# Patient Record
Sex: MALE | Race: WHITE | NOT HISPANIC OR LATINO | Employment: OTHER | URBAN - METROPOLITAN AREA
[De-identification: names, ages, dates, MRNs, and addresses within clinical notes are randomized per-mention and may not be internally consistent; named-entity substitution may affect disease eponyms.]

---

## 2017-01-13 ENCOUNTER — OFFICE VISIT (OUTPATIENT)
Dept: INTERNAL MEDICINE | Facility: CLINIC | Age: 82
End: 2017-01-13

## 2017-01-13 VITALS
TEMPERATURE: 97.4 F | SYSTOLIC BLOOD PRESSURE: 118 MMHG | BODY MASS INDEX: 26.61 KG/M2 | DIASTOLIC BLOOD PRESSURE: 68 MMHG | HEART RATE: 76 BPM | RESPIRATION RATE: 16 BRPM | WEIGHT: 155 LBS | OXYGEN SATURATION: 98 %

## 2017-01-13 DIAGNOSIS — J30.2 SEASONAL ALLERGIC RHINITIS, UNSPECIFIED ALLERGIC RHINITIS TRIGGER: ICD-10-CM

## 2017-01-13 DIAGNOSIS — J06.9 ACUTE URI: Primary | ICD-10-CM

## 2017-01-13 PROCEDURE — 99213 OFFICE O/P EST LOW 20 MIN: CPT | Performed by: NURSE PRACTITIONER

## 2017-01-13 RX ORDER — CETIRIZINE HYDROCHLORIDE 5 MG/1
5 TABLET ORAL DAILY
Qty: 90 TABLET | Refills: 3 | Status: SHIPPED | OUTPATIENT
Start: 2017-01-13 | End: 2019-03-18

## 2017-01-13 RX ORDER — FLUTICASONE PROPIONATE 50 MCG
2 SPRAY, SUSPENSION (ML) NASAL DAILY
Qty: 1 EACH | Refills: 0 | Status: SHIPPED | OUTPATIENT
Start: 2017-01-13 | End: 2017-02-10 | Stop reason: SDUPTHER

## 2017-01-13 NOTE — PROGRESS NOTES
Vitals:    01/13/17 1043   BP: 118/68   Pulse: 76   Resp: 16   Temp: 97.4 °F (36.3 °C)   SpO2: 98%     Last 2 weights    01/13/17  1043   Weight: 155 lb (70.3 kg)     Social History   Substance Use Topics   • Smoking status: Former Smoker   • Smokeless tobacco: Not on file   • Alcohol use No       Subjective     HPI  Upper Respiratory Infection: Patient complains of symptoms of a URI. Symptoms include congestion and cough, scratchy throat, and slight sore throat. Onset of symptoms was 3 weeks ago, unchanged since that time..  He is drinking moderate amounts of fluids. Evaluation to date: none. Treatment to date: ken vapor rub, salt water gargles.    The following portions of the patient's history were reviewed and updated as appropriate: allergies, current medications, past medical history, past social history and problem list.    Review of Systems   Constitutional: Negative.    HENT: Positive for congestion and sore throat.         Scratchy throat     Respiratory: Positive for cough.    Cardiovascular: Negative.        Objective     Physical Exam   Constitutional: He is oriented to person, place, and time. He appears well-developed and well-nourished.   HENT:   Head: Normocephalic and atraumatic.   Mouth/Throat: Posterior oropharyngeal erythema present.   Neck: Normal range of motion.   Cardiovascular: Normal rate, regular rhythm and normal heart sounds.    Pulmonary/Chest: Effort normal and breath sounds normal.   Musculoskeletal: Normal range of motion.   Neurological: He is alert and oriented to person, place, and time.   Nursing note and vitals reviewed.      Assessment/Plan   Haris was seen today for uri, cough and fatigue.    Diagnoses and all orders for this visit:    Acute URI    Seasonal allergic rhinitis, unspecified allergic rhinitis trigger    Other orders  -     Dextromethorphan-Guaifenesin  MG capsule; Take  mg by mouth Every 4 (Four) Hours As Needed (congestion and cough).  -      cetirizine (zyrTEC) 5 MG tablet; Take 1 tablet by mouth Daily.  -     fluticasone (FLONASE) 50 MCG/ACT nasal spray; 2 sprays into each nostril Daily for 30 days.         -advised pt to check prices for OTC medications prior to picking up scripts I sent in electronically to compare prices  -cont home meds  -increase fluid intake  -FU PRN or if symptoms persist/worsen

## 2017-01-26 ENCOUNTER — OFFICE VISIT (OUTPATIENT)
Dept: ORTHOPEDIC SURGERY | Facility: CLINIC | Age: 82
End: 2017-01-26

## 2017-01-26 VITALS — BODY MASS INDEX: 26.12 KG/M2 | WEIGHT: 153 LBS | TEMPERATURE: 97.5 F | HEIGHT: 64 IN

## 2017-01-26 DIAGNOSIS — M79.641 BILATERAL HAND PAIN: Primary | ICD-10-CM

## 2017-01-26 DIAGNOSIS — M79.642 BILATERAL HAND PAIN: Primary | ICD-10-CM

## 2017-01-26 DIAGNOSIS — G56.03 BILATERAL CARPAL TUNNEL SYNDROME: ICD-10-CM

## 2017-01-26 PROCEDURE — 99203 OFFICE O/P NEW LOW 30 MIN: CPT | Performed by: ORTHOPAEDIC SURGERY

## 2017-01-26 PROCEDURE — 20526 THER INJECTION CARP TUNNEL: CPT | Performed by: ORTHOPAEDIC SURGERY

## 2017-01-26 PROCEDURE — 73130 X-RAY EXAM OF HAND: CPT | Performed by: ORTHOPAEDIC SURGERY

## 2017-01-26 RX ADMIN — BUPIVACAINE HYDROCHLORIDE 1 ML: 5 INJECTION, SOLUTION PERINEURAL at 14:35

## 2017-01-26 RX ADMIN — METHYLPREDNISOLONE ACETATE 80 MG: 80 INJECTION, SUSPENSION INTRA-ARTICULAR; INTRALESIONAL; INTRAMUSCULAR; SOFT TISSUE at 14:35

## 2017-01-26 NOTE — PROGRESS NOTES
New Bilateral Hand      Patient: Haris Sweeney        YOB: 1929        Chief Complaints: Bilateral hand pain right is worse and left  Chief Complaint   Patient presents with   • Left Hand - Establish Care   • Right Hand - Establish Care         History of Present Illness: This is a 87-year-old gentleman is right-hand-dominant presents complaining of bilateral hand pain and numbness in complaints of dropping things right is worse than left.  He gets significant numbness at night that wakes him up.  He's tried some braces his symptoms are moderate intermittent grinding numbness swelling he's tried ice rest and some braces.  His past medical history marked for diabetes anemia thyroid rheumatoid cardiac disease osteoporosis bladder cancer depression anxiety all of which are stable at present per the patient    HPI      Allergies:   Allergies   Allergen Reactions   • Digoxin And Related    • Keflex [Cephalexin]        Medications:   Home Medications:  Current Outpatient Prescriptions on File Prior to Visit   Medication Sig   • allopurinol (ZYLOPRIM) 100 MG tablet Take 1 tablet by mouth 2 (two) times a day.   • B Complex Vitamins (VITAMIN B COMPLEX PO) Take  by mouth.   • Blood Glucose Monitoring Suppl (ONE TOUCH ULTRA 2) W/DEVICE kit Test glucose daily   • carvedilol (COREG) 6.25 MG tablet TAKE 1 TABLET BY MOUTH TWICE A DAY   • cetirizine (zyrTEC) 5 MG tablet Take 1 tablet by mouth Daily.   • citalopram (CeleXA) 10 MG tablet Take 1 tablet by mouth daily. TAKE 1 TABLET BY MOUTH DAILY   • Dextromethorphan-Guaifenesin  MG capsule Take  mg by mouth Every 4 (Four) Hours As Needed (congestion and cough).   • docusate sodium (COLACE) 100 MG capsule Take 100 mg by mouth 2 (two) times a day.   • finasteride (PROSCAR) 5 MG tablet Take 5 mg by mouth Daily.   • fluticasone (FLONASE) 50 MCG/ACT nasal spray 2 sprays into each nostril Daily for 30 days.   • furosemide (LASIX) 40 MG tablet TAKE 1 TABLET BY  MOUTH 2 (TWO) TIMES A DAY.   • glimepiride (AMARYL) 2 MG tablet Take 2 mg by mouth every morning before breakfast.   • glucose blood test strip Test glucose daily DX E11.9   • levothyroxine (SYNTHROID, LEVOTHROID) 100 MCG tablet Take 1 tablet by mouth daily.   • metFORMIN (GLUCOPHAGE) 500 MG tablet TAKE 1 TABLET BY MOUTH TWICE A DAY   • ONE TOUCH LANCETS misc Test glucose daily dx e11.9   • pantoprazole (PROTONIX) 40 MG EC tablet Take 40 mg by mouth daily.   • potassium chloride (K-DUR) 10 MEQ CR tablet Take 10 mEq by mouth daily.   • Saw Palmetto 450 MG capsule Take  by mouth.   • valsartan (DIOVAN) 40 MG tablet Take 1 tablet by mouth Daily.   • warfarin (COUMADIN) 2.5 MG tablet TAKE 5MG ON MONDAY AND FRIDAY TAKE 2.5MG ALL OTHER DAYS.     No current facility-administered medications on file prior to visit.      Current Medications:  Scheduled Meds:  Continuous Infusions:  No current facility-administered medications for this visit.   PRN Meds:.    Past Medical History   Diagnosis Date   • Bladder cancer    • Kidney stones    • Thyroid goiter         Past Surgical History   Procedure Laterality Date   • Total thyroidectomy     • Cataract extraction          Social History     Occupational History   • Not on file.     Social History Main Topics   • Smoking status: Former Smoker     Types: Cigarettes   • Smokeless tobacco: Never Used   • Alcohol use No   • Drug use: No   • Sexual activity: Not on file    Social History     Social History Narrative        Family History   Problem Relation Age of Onset   • Heart disease Mother    • No Known Problems Father    • Prostate cancer Brother              Review of Systems: 14 point review of systems are remarkable for pertinent positives listed in the chart by the patient the remainder are negative    Review of Systems      Physical Exam: 87 y.o. male  General Appearance:    Alert, cooperative, in no acute distress                 Vitals:    01/26/17 1406   Temp: 97.5 °F (36.4  "°C)   TempSrc: Temporal Artery    Weight: 153 lb (69.4 kg)   Height: 64\" (162.6 cm)      Patient is alert and read ×3 no acute distress appears her above-listed at height weight and age.  Affect is normal respiratory rate is normal unlabored. Heart rate regular rate rhythm, sclera, dentition and hearing are normal for the purpose of this exam.        Ortho Exam physical exam of both hands is no overlying skin changes lipedema lymphadenopathy his exam is symmetric side to side he has marked thenar atrophy bilaterally he has a positive Tinel's positive Phalen's test.  He has good wrist range of motion normal form exam normal elbow exam he has 1+ to 2+ distal pulses and good capillary refill           Radiology:   AP, Lateral of the Hand were ordered/reviewed to evaluate pain.  These were done bilaterally to evaluate his complaints have no comparative films.  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  His right is worse and left talked about options he has such thenar atrophy that I'm not sure release would help I like him to see one of the hand goes.  His EMG does show that he has moderate to severe carpal tunnel syndrome.  We'll inject his more symptomatic or I will get him over to Dr. Blanco                Small Joint Arthrocentesis  Consent given by: patient  Site marked: site marked  Timeout: Immediately prior to procedure a time out was called to verify the correct patient, procedure, equipment, support staff and site/side marked as required   Supporting Documentation  Indications: pain   Procedure Details  Location: thumb - Thumb joint: carpa; tunnel right.  Preparation: Patient was prepped and draped in the usual sterile fashion  Needle size: 25 G  Approach: volar  Medications " administered: 80 mg methylPREDNISolone acetate 80 MG/ML; 1 mL bupivacaine

## 2017-01-26 NOTE — MR AVS SNAPSHOT
[facility]  [phone]                    Haris Sweeney   1/26/2017 1:45 PM   Office Visit    Dept Phone:  [phone]   Encounter #:  [id number]    Provider:  Ashley Mcclellan MD   Department:  [facility]                Your Full Care Plan              Your Updated Medication List          This list is accurate as of: 1/26/17  2:53 PM.  Always use your most recent med list.                allopurinol 100 MG tablet   Commonly known as:  ZYLOPRIM   Take 1 tablet by mouth 2 (two) times a day.       carvedilol 6.25 MG tablet   Commonly known as:  COREG       cetirizine 5 MG tablet   Commonly known as:  zyrTEC   Take 1 tablet by mouth Daily.       citalopram 10 MG tablet   Commonly known as:  CeleXA   Take 1 tablet by mouth daily. TAKE 1 TABLET BY MOUTH DAILY       Dextromethorphan-Guaifenesin  MG capsule   Take  mg by mouth Every 4 (Four) Hours As Needed (congestion and cough).       docusate sodium 100 MG capsule   Commonly known as:  COLACE       finasteride 5 MG tablet   Commonly known as:  PROSCAR       fluticasone 50 MCG/ACT nasal spray   Commonly known as:  FLONASE   2 sprays into each nostril Daily for 30 days.       furosemide 40 MG tablet   Commonly known as:  LASIX   TAKE 1 TABLET BY MOUTH 2 (TWO) TIMES A DAY.       glimepiride 2 MG tablet   Commonly known as:  AMARYL       glucose blood test strip   Test glucose daily DX E11.9       levothyroxine 100 MCG tablet   Commonly known as:  SYNTHROID, LEVOTHROID   Take 1 tablet by mouth daily.       metFORMIN 500 MG tablet   Commonly known as:  GLUCOPHAGE   TAKE 1 TABLET BY MOUTH TWICE A DAY       ONE TOUCH LANCETS misc   Test glucose daily dx e11.9       ONE TOUCH ULTRA 2 W/DEVICE kit   Test glucose daily       pantoprazole 40 MG EC tablet   Commonly known as:  PROTONIX       potassium chloride 10 MEQ CR tablet   Commonly known as:   "K-DUR       Saw Palmetto 450 MG capsule       valsartan 40 MG tablet   Commonly known as:  DIOVAN   Take 1 tablet by mouth Daily.       VITAMIN B COMPLEX PO       warfarin 2.5 MG tablet   Commonly known as:  COUMADIN               We Performed the Following     Ambulatory Referral to Hand Surgery     Small Joint Arthrocentesis     XR Hand 3+ View Bilateral       You Were Diagnosed With        Codes Comments    Bilateral hand pain    -  Primary ICD-10-CM: M79.641, M79.642  ICD-9-CM: 729.5     Bilateral carpal tunnel syndrome     ICD-10-CM: G56.03  ICD-9-CM: 354.0       Instructions     None    Patient Instructions History      Goals     Eat more fruits and vegetables     I have stressed to him the need for proper healthy diet low carbohydrate with more vegetables and high-fiber        Upcoming Appointments     Visit Type Date Time Department    OFFICE VISIT 1/26/2017  1:45 PM [facility]    OFFICE VISIT 4/5/2017  1:30 PM [facility] 1 [id number]      [employer] Signup     Our records indicate that you have declined [facility] [employer] signup. If you would like to sign up for [employer], please email [email or web address] or call [phone] to obtain an activation code.             Other Info from Your Visit           Your Appointments     Apr 05, 2017  1:30 PM EDT   Office Visit with Srikanth Madsen MD   [facility] (--)    [address]  [facility] [address] [phone]           Arrive 15 minutes prior to appointment.              Allergies     Digoxin And Related      Keflex [Cephalexin]        Reason for Visit     Left Hand - Establish Care     Right Hand - Establish Care           Vital Signs     Temperature Height Weight Body Mass Index Smoking Status       97.5 °F (36.4 °C) (Temporal Artery ) 64\" (162.6 cm) 153 lb (69.4 kg) 26.26 kg/m2 Former Smoker       Problems and Diagnoses Noted     Bilateral carpal tunnel syndrome    Bilateral hand pain        "

## 2017-01-31 RX ORDER — BUPIVACAINE HYDROCHLORIDE 5 MG/ML
1 INJECTION, SOLUTION PERINEURAL
Status: COMPLETED | OUTPATIENT
Start: 2017-01-26 | End: 2017-01-26

## 2017-01-31 RX ORDER — METHYLPREDNISOLONE ACETATE 80 MG/ML
80 INJECTION, SUSPENSION INTRA-ARTICULAR; INTRALESIONAL; INTRAMUSCULAR; SOFT TISSUE
Status: COMPLETED | OUTPATIENT
Start: 2017-01-26 | End: 2017-01-26

## 2017-02-10 RX ORDER — FLUTICASONE PROPIONATE 50 MCG
SPRAY, SUSPENSION (ML) NASAL
Qty: 16 ML | Refills: 0 | Status: SHIPPED | OUTPATIENT
Start: 2017-02-10 | End: 2017-03-20 | Stop reason: SDUPTHER

## 2017-03-20 RX ORDER — FLUTICASONE PROPIONATE 50 MCG
SPRAY, SUSPENSION (ML) NASAL
Qty: 16 ML | Refills: 5 | Status: SHIPPED | OUTPATIENT
Start: 2017-03-20 | End: 2017-04-04 | Stop reason: SDUPTHER

## 2017-03-22 RX ORDER — LEVOTHYROXINE SODIUM 0.1 MG/1
TABLET ORAL
Qty: 90 TABLET | Refills: 3 | Status: SHIPPED | OUTPATIENT
Start: 2017-03-22 | End: 2018-01-08 | Stop reason: SDUPTHER

## 2017-03-23 RX ORDER — ALLOPURINOL 100 MG/1
TABLET ORAL
Qty: 180 TABLET | Refills: 3 | Status: SHIPPED | OUTPATIENT
Start: 2017-03-23 | End: 2018-01-08 | Stop reason: SDUPTHER

## 2017-04-04 RX ORDER — FLUTICASONE PROPIONATE 50 MCG
SPRAY, SUSPENSION (ML) NASAL
Qty: 16 ML | Refills: 0 | Status: SHIPPED | OUTPATIENT
Start: 2017-04-04 | End: 2017-05-24 | Stop reason: SDUPTHER

## 2017-04-05 ENCOUNTER — OFFICE VISIT (OUTPATIENT)
Dept: INTERNAL MEDICINE | Facility: CLINIC | Age: 82
End: 2017-04-05

## 2017-04-05 VITALS
DIASTOLIC BLOOD PRESSURE: 70 MMHG | HEART RATE: 70 BPM | TEMPERATURE: 98.7 F | WEIGHT: 156 LBS | BODY MASS INDEX: 26.78 KG/M2 | SYSTOLIC BLOOD PRESSURE: 128 MMHG | OXYGEN SATURATION: 98 %

## 2017-04-05 DIAGNOSIS — K21.9 GASTROESOPHAGEAL REFLUX DISEASE WITHOUT ESOPHAGITIS: ICD-10-CM

## 2017-04-05 DIAGNOSIS — E03.8 OTHER SPECIFIED HYPOTHYROIDISM: ICD-10-CM

## 2017-04-05 DIAGNOSIS — M15.9 GENERALIZED OSTEOARTHRITIS OF MULTIPLE SITES: ICD-10-CM

## 2017-04-05 DIAGNOSIS — G56.03 BILATERAL CARPAL TUNNEL SYNDROME: ICD-10-CM

## 2017-04-05 DIAGNOSIS — I10 ESSENTIAL HYPERTENSION: Primary | ICD-10-CM

## 2017-04-05 DIAGNOSIS — E11.9 NON-INSULIN DEPENDENT TYPE 2 DIABETES MELLITUS (HCC): ICD-10-CM

## 2017-04-05 PROCEDURE — 99214 OFFICE O/P EST MOD 30 MIN: CPT | Performed by: INTERNAL MEDICINE

## 2017-04-06 LAB
HBA1C MFR BLD: 5.1 % (ref 4.8–5.6)
MICROALBUMIN UR-MCNC: 5.3 UG/ML

## 2017-04-15 NOTE — PROGRESS NOTES
Subjective   Haris Sweeney is a 87 y.o. male.   He is here today for hypertension GERD non-insulin-dependent type 2 diabetes hypothyroidism bilateral carpal tunnel syndrome osteoarthritis of multiple sites  History of Present Illness   He is here today for hypertension GERD non-insulin-dependent type 2 diabetes hypothyroidism bilateral carpal tunnel syndrome osteoarthritis multiple sites  The following portions of the patient's history were reviewed and updated as appropriate: allergies, current medications, past family history, past medical history, past social history, past surgical history and problem list.    Review of Systems   Musculoskeletal: Positive for arthralgias.   All other systems reviewed and are negative.      Objective   Physical Exam   Constitutional: He is oriented to person, place, and time. He appears well-developed and well-nourished. He is cooperative.   HENT:   Head: Normocephalic and atraumatic.   Right Ear: Hearing, tympanic membrane, external ear and ear canal normal.   Left Ear: Hearing, tympanic membrane, external ear and ear canal normal.   Nose: Nose normal.   Mouth/Throat: Uvula is midline, oropharynx is clear and moist and mucous membranes are normal.   Eyes: Conjunctivae, EOM and lids are normal. Pupils are equal, round, and reactive to light.   Neck: Phonation normal. Neck supple. Carotid bruit is not present.   Cardiovascular: Normal rate, regular rhythm and normal heart sounds.  Exam reveals no gallop and no friction rub.    No murmur heard.  Pulmonary/Chest: Effort normal and breath sounds normal. No respiratory distress.   Abdominal: Soft. Bowel sounds are normal. He exhibits no distension and no mass. There is no hepatosplenomegaly. There is no tenderness. There is no rebound and no guarding. No hernia.   Musculoskeletal: He exhibits no edema.        Right wrist: He exhibits decreased range of motion and tenderness.        Left wrist: He exhibits decreased range of motion and  tenderness.   Neurological: He is alert and oriented to person, place, and time. Coordination and gait normal.   Skin: Skin is warm and dry.   Psychiatric: He has a normal mood and affect. His speech is normal and behavior is normal. Judgment and thought content normal.   Nursing note and vitals reviewed.      Assessment/Plan   Diagnoses and all orders for this visit:    Essential hypertension    Gastroesophageal reflux disease without esophagitis    Non-insulin dependent type 2 diabetes mellitus  -     Hemoglobin A1c  -     MicroAlbumin, Urine, Random    Other specified hypothyroidism    Bilateral carpal tunnel syndrome    Generalized osteoarthritis of multiple sites    Other orders  -     Pneumococcal Conjugate Vaccine 13-Valent All      Hypertension well-controlled on current medication  GERD stable on current medication  Non-insulin-dependent type 2 diabetes follow hemoglobin A1c  Hypothyroidism follow TSH free T4  Bilateral carpal tunnel syndrome supportive meds and hand surgery consult when he is ready  Osteoporosis multiple sites supportive meds physical therapy

## 2017-04-17 ENCOUNTER — APPOINTMENT (OUTPATIENT)
Dept: GENERAL RADIOLOGY | Facility: HOSPITAL | Age: 82
End: 2017-04-17

## 2017-04-17 ENCOUNTER — APPOINTMENT (OUTPATIENT)
Dept: CT IMAGING | Facility: HOSPITAL | Age: 82
End: 2017-04-17

## 2017-04-17 ENCOUNTER — HOSPITAL ENCOUNTER (INPATIENT)
Facility: HOSPITAL | Age: 82
LOS: 3 days | Discharge: HOME | End: 2017-04-20
Attending: EMERGENCY MEDICINE | Admitting: INTERNAL MEDICINE

## 2017-04-17 DIAGNOSIS — N28.9 RENAL INSUFFICIENCY: ICD-10-CM

## 2017-04-17 DIAGNOSIS — S06.6X1A: ICD-10-CM

## 2017-04-17 DIAGNOSIS — R55 SYNCOPE AND COLLAPSE: Primary | ICD-10-CM

## 2017-04-17 DIAGNOSIS — R53.1 GENERALIZED WEAKNESS: ICD-10-CM

## 2017-04-17 PROBLEM — I61.9 ICH (INTRACEREBRAL HEMORRHAGE) (HCC): Status: ACTIVE | Noted: 2017-04-17

## 2017-04-17 LAB
ALBUMIN SERPL-MCNC: 3 G/DL (ref 3.5–5.2)
ALBUMIN/GLOB SERPL: 0.9 G/DL
ALP SERPL-CCNC: 65 U/L (ref 39–117)
ALT SERPL W P-5'-P-CCNC: 21 U/L (ref 1–41)
ANION GAP SERPL CALCULATED.3IONS-SCNC: 12.2 MMOL/L
AST SERPL-CCNC: 30 U/L (ref 1–40)
BASOPHILS # BLD AUTO: 0.01 10*3/MM3 (ref 0–0.2)
BASOPHILS NFR BLD AUTO: 0.1 % (ref 0–1.5)
BILIRUB SERPL-MCNC: 0.4 MG/DL (ref 0.1–1.2)
BUN BLD-MCNC: 49 MG/DL (ref 8–23)
BUN/CREAT SERPL: 32.5 (ref 7–25)
CALCIUM SPEC-SCNC: 8.9 MG/DL (ref 8.6–10.5)
CHLORIDE SERPL-SCNC: 94 MMOL/L (ref 98–107)
CO2 SERPL-SCNC: 26.8 MMOL/L (ref 22–29)
CREAT BLD-MCNC: 1.51 MG/DL (ref 0.76–1.27)
DEPRECATED RDW RBC AUTO: 54.4 FL (ref 37–54)
EOSINOPHIL # BLD AUTO: 0.05 10*3/MM3 (ref 0–0.7)
EOSINOPHIL NFR BLD AUTO: 0.6 % (ref 0.3–6.2)
ERYTHROCYTE [DISTWIDTH] IN BLOOD BY AUTOMATED COUNT: 14.8 % (ref 11.5–14.5)
GFR SERPL CREATININE-BSD FRML MDRD: 44 ML/MIN/1.73
GLOBULIN UR ELPH-MCNC: 3.2 GM/DL
GLUCOSE BLD-MCNC: 117 MG/DL (ref 65–99)
GLUCOSE BLDC GLUCOMTR-MCNC: 116 MG/DL (ref 70–130)
HCT VFR BLD AUTO: 44.1 % (ref 40.4–52.2)
HGB BLD-MCNC: 14.7 G/DL (ref 13.7–17.6)
IMM GRANULOCYTES # BLD: 0.04 10*3/MM3 (ref 0–0.03)
IMM GRANULOCYTES NFR BLD: 0.4 % (ref 0–0.5)
INR PPP: 1.65 (ref 0.9–1.1)
LYMPHOCYTES # BLD AUTO: 1.39 10*3/MM3 (ref 0.9–4.8)
LYMPHOCYTES NFR BLD AUTO: 15.5 % (ref 19.6–45.3)
MCH RBC QN AUTO: 33.3 PG (ref 27–32.7)
MCHC RBC AUTO-ENTMCNC: 33.3 G/DL (ref 32.6–36.4)
MCV RBC AUTO: 99.8 FL (ref 79.8–96.2)
MONOCYTES # BLD AUTO: 0.85 10*3/MM3 (ref 0.2–1.2)
MONOCYTES NFR BLD AUTO: 9.5 % (ref 5–12)
NEUTROPHILS # BLD AUTO: 6.63 10*3/MM3 (ref 1.9–8.1)
NEUTROPHILS NFR BLD AUTO: 73.9 % (ref 42.7–76)
PLATELET # BLD AUTO: 174 10*3/MM3 (ref 140–500)
PMV BLD AUTO: 10.7 FL (ref 6–12)
POTASSIUM BLD-SCNC: 5.4 MMOL/L (ref 3.5–5.2)
PROT SERPL-MCNC: 6.2 G/DL (ref 6–8.5)
PROTHROMBIN TIME: 19 SECONDS (ref 11.7–14.2)
RBC # BLD AUTO: 4.42 10*6/MM3 (ref 4.6–6)
SODIUM BLD-SCNC: 133 MMOL/L (ref 136–145)
TROPONIN T SERPL-MCNC: 0.04 NG/ML (ref 0–0.03)
WBC NRBC COR # BLD: 8.97 10*3/MM3 (ref 4.5–10.7)

## 2017-04-17 PROCEDURE — 82962 GLUCOSE BLOOD TEST: CPT

## 2017-04-17 PROCEDURE — 85025 COMPLETE CBC W/AUTO DIFF WBC: CPT | Performed by: EMERGENCY MEDICINE

## 2017-04-17 PROCEDURE — 72072 X-RAY EXAM THORAC SPINE 3VWS: CPT

## 2017-04-17 PROCEDURE — 93010 ELECTROCARDIOGRAM REPORT: CPT | Performed by: INTERNAL MEDICINE

## 2017-04-17 PROCEDURE — 80053 COMPREHEN METABOLIC PANEL: CPT | Performed by: EMERGENCY MEDICINE

## 2017-04-17 PROCEDURE — 25010000002 PROTHROMBIN COMPLEX CONC HUMAN 1000 UNITS KIT: Performed by: EMERGENCY MEDICINE

## 2017-04-17 PROCEDURE — 93005 ELECTROCARDIOGRAM TRACING: CPT | Performed by: NURSE PRACTITIONER

## 2017-04-17 PROCEDURE — 99284 EMERGENCY DEPT VISIT MOD MDM: CPT

## 2017-04-17 PROCEDURE — 70450 CT HEAD/BRAIN W/O DYE: CPT

## 2017-04-17 PROCEDURE — 25010000002 VITAMIN K1 PER 1 MG: Performed by: EMERGENCY MEDICINE

## 2017-04-17 PROCEDURE — 85610 PROTHROMBIN TIME: CPT | Performed by: EMERGENCY MEDICINE

## 2017-04-17 PROCEDURE — 84484 ASSAY OF TROPONIN QUANT: CPT | Performed by: EMERGENCY MEDICINE

## 2017-04-17 PROCEDURE — 71020 HC CHEST PA AND LATERAL: CPT

## 2017-04-17 PROCEDURE — C9132 KCENTRA, PER I.U.: HCPCS | Performed by: EMERGENCY MEDICINE

## 2017-04-17 RX ORDER — ONDANSETRON 4 MG/1
4 TABLET, FILM COATED ORAL EVERY 6 HOURS PRN
Status: DISCONTINUED | OUTPATIENT
Start: 2017-04-17 | End: 2017-04-20 | Stop reason: HOSPADM

## 2017-04-17 RX ORDER — SODIUM CHLORIDE 0.9 % (FLUSH) 0.9 %
1-10 SYRINGE (ML) INJECTION AS NEEDED
Status: DISCONTINUED | OUTPATIENT
Start: 2017-04-17 | End: 2017-04-20 | Stop reason: HOSPADM

## 2017-04-17 RX ORDER — ONDANSETRON 4 MG/1
4 TABLET, ORALLY DISINTEGRATING ORAL EVERY 6 HOURS PRN
Status: DISCONTINUED | OUTPATIENT
Start: 2017-04-17 | End: 2017-04-20 | Stop reason: HOSPADM

## 2017-04-17 RX ORDER — CARVEDILOL 6.25 MG/1
6.25 TABLET ORAL 2 TIMES DAILY WITH MEALS
COMMUNITY
End: 2017-05-22

## 2017-04-17 RX ORDER — IPRATROPIUM BROMIDE AND ALBUTEROL SULFATE 2.5; .5 MG/3ML; MG/3ML
3 SOLUTION RESPIRATORY (INHALATION) EVERY 6 HOURS PRN
Status: DISCONTINUED | OUTPATIENT
Start: 2017-04-17 | End: 2017-04-20 | Stop reason: HOSPADM

## 2017-04-17 RX ORDER — ONDANSETRON 2 MG/ML
4 INJECTION INTRAMUSCULAR; INTRAVENOUS EVERY 6 HOURS PRN
Status: DISCONTINUED | OUTPATIENT
Start: 2017-04-17 | End: 2017-04-20 | Stop reason: HOSPADM

## 2017-04-17 RX ORDER — SODIUM CHLORIDE 9 MG/ML
75 INJECTION, SOLUTION INTRAVENOUS CONTINUOUS
Status: DISCONTINUED | OUTPATIENT
Start: 2017-04-17 | End: 2017-04-19

## 2017-04-17 RX ORDER — SODIUM CHLORIDE 0.9 % (FLUSH) 0.9 %
10 SYRINGE (ML) INJECTION AS NEEDED
Status: DISCONTINUED | OUTPATIENT
Start: 2017-04-17 | End: 2017-04-20 | Stop reason: HOSPADM

## 2017-04-17 RX ORDER — BISACODYL 10 MG
10 SUPPOSITORY, RECTAL RECTAL DAILY PRN
Status: DISCONTINUED | OUTPATIENT
Start: 2017-04-17 | End: 2017-04-20 | Stop reason: HOSPADM

## 2017-04-17 RX ORDER — ACETAMINOPHEN 650 MG/1
650 SUPPOSITORY RECTAL EVERY 4 HOURS PRN
Status: DISCONTINUED | OUTPATIENT
Start: 2017-04-17 | End: 2017-04-20 | Stop reason: HOSPADM

## 2017-04-17 RX ORDER — ACETAMINOPHEN 325 MG/1
650 TABLET ORAL EVERY 4 HOURS PRN
Status: DISCONTINUED | OUTPATIENT
Start: 2017-04-17 | End: 2017-04-20 | Stop reason: HOSPADM

## 2017-04-17 RX ORDER — BISACODYL 5 MG/1
5 TABLET, DELAYED RELEASE ORAL DAILY PRN
Status: DISCONTINUED | OUTPATIENT
Start: 2017-04-17 | End: 2017-04-20 | Stop reason: HOSPADM

## 2017-04-17 RX ORDER — MAGNESIUM HYDROXIDE/ALUMINUM HYDROXICE/SIMETHICONE 120; 1200; 1200 MG/30ML; MG/30ML; MG/30ML
30 SUSPENSION ORAL EVERY 6 HOURS PRN
Status: DISCONTINUED | OUTPATIENT
Start: 2017-04-17 | End: 2017-04-20 | Stop reason: HOSPADM

## 2017-04-17 RX ADMIN — SODIUM CHLORIDE 125 ML/HR: 9 INJECTION, SOLUTION INTRAVENOUS at 22:14

## 2017-04-17 RX ADMIN — PHYTONADIONE 10 MG: 10 INJECTION, EMULSION INTRAMUSCULAR; INTRAVENOUS; SUBCUTANEOUS at 22:21

## 2017-04-17 RX ADMIN — PROTHROMBIN, COAGULATION FACTOR VII HUMAN, COAGULATION FACTOR IX HUMAN, COAGULATION FACTOR X HUMAN, PROTEIN C, PROTEIN S HUMAN, AND WATER 1654 UNITS: KIT at 22:20

## 2017-04-17 RX ADMIN — SODIUM CHLORIDE 500 ML: 9 INJECTION, SOLUTION INTRAVENOUS at 20:35

## 2017-04-18 ENCOUNTER — APPOINTMENT (OUTPATIENT)
Dept: CARDIOLOGY | Facility: HOSPITAL | Age: 82
End: 2017-04-18

## 2017-04-18 ENCOUNTER — APPOINTMENT (OUTPATIENT)
Dept: CT IMAGING | Facility: HOSPITAL | Age: 82
End: 2017-04-18

## 2017-04-18 PROBLEM — I95.2 HYPOTENSION DUE TO DRUGS: Status: RESOLVED | Noted: 2017-04-18 | Resolved: 2017-04-18

## 2017-04-18 PROBLEM — I95.2 HYPOTENSION DUE TO DRUGS: Status: ACTIVE | Noted: 2017-04-18

## 2017-04-18 PROBLEM — S06.6X1A TRAUMATIC SUBARACHNOID HEMORRHAGE WITH LOSS OF CONSCIOUSNESS OF 30 MINUTES OR LESS (HCC): Status: ACTIVE | Noted: 2017-04-17

## 2017-04-18 LAB
ANION GAP SERPL CALCULATED.3IONS-SCNC: 13.3 MMOL/L
BUN BLD-MCNC: 38 MG/DL (ref 8–23)
BUN/CREAT SERPL: 33.3 (ref 7–25)
CALCIUM SPEC-SCNC: 7.9 MG/DL (ref 8.6–10.5)
CHLORIDE SERPL-SCNC: 101 MMOL/L (ref 98–107)
CK MB SERPL-CCNC: 3.34 NG/ML
CK SERPL-CCNC: 93 U/L (ref 20–200)
CO2 SERPL-SCNC: 22.7 MMOL/L (ref 22–29)
CREAT BLD-MCNC: 1.14 MG/DL (ref 0.76–1.27)
GFR SERPL CREATININE-BSD FRML MDRD: 61 ML/MIN/1.73
GLUCOSE BLD-MCNC: 179 MG/DL (ref 65–99)
GLUCOSE BLDC GLUCOMTR-MCNC: 108 MG/DL (ref 70–130)
GLUCOSE BLDC GLUCOMTR-MCNC: 176 MG/DL (ref 70–130)
GLUCOSE BLDC GLUCOMTR-MCNC: 180 MG/DL (ref 70–130)
GLUCOSE BLDC GLUCOMTR-MCNC: 223 MG/DL (ref 70–130)
HOLD SPECIMEN: NORMAL
HOLD SPECIMEN: NORMAL
INR PPP: 1.19 (ref 0.9–1.1)
MAGNESIUM SERPL-MCNC: 2 MG/DL (ref 1.6–2.4)
NT-PROBNP SERPL-MCNC: 1667 PG/ML (ref 0–1800)
POTASSIUM BLD-SCNC: 4.2 MMOL/L (ref 3.5–5.2)
PROTHROMBIN TIME: 14.7 SECONDS (ref 11.7–14.2)
SODIUM BLD-SCNC: 137 MMOL/L (ref 136–145)
T3FREE SERPL-MCNC: 1.27 PG/ML (ref 2–4.4)
T4 FREE SERPL-MCNC: 1.11 NG/DL (ref 0.93–1.7)
TROPONIN T SERPL-MCNC: 0.02 NG/ML (ref 0–0.03)
TSH SERPL DL<=0.05 MIU/L-ACNC: 1.55 MIU/ML (ref 0.27–4.2)
WHOLE BLOOD HOLD SPECIMEN: NORMAL
WHOLE BLOOD HOLD SPECIMEN: NORMAL

## 2017-04-18 PROCEDURE — 85610 PROTHROMBIN TIME: CPT | Performed by: EMERGENCY MEDICINE

## 2017-04-18 PROCEDURE — 82550 ASSAY OF CK (CPK): CPT | Performed by: NURSE PRACTITIONER

## 2017-04-18 PROCEDURE — 84484 ASSAY OF TROPONIN QUANT: CPT | Performed by: NURSE PRACTITIONER

## 2017-04-18 PROCEDURE — 93010 ELECTROCARDIOGRAM REPORT: CPT | Performed by: INTERNAL MEDICINE

## 2017-04-18 PROCEDURE — 82553 CREATINE MB FRACTION: CPT | Performed by: NURSE PRACTITIONER

## 2017-04-18 PROCEDURE — 93005 ELECTROCARDIOGRAM TRACING: CPT | Performed by: NURSE PRACTITIONER

## 2017-04-18 PROCEDURE — 99222 1ST HOSP IP/OBS MODERATE 55: CPT | Performed by: INTERNAL MEDICINE

## 2017-04-18 PROCEDURE — 70450 CT HEAD/BRAIN W/O DYE: CPT

## 2017-04-18 PROCEDURE — 92610 EVALUATE SWALLOWING FUNCTION: CPT

## 2017-04-18 PROCEDURE — 84443 ASSAY THYROID STIM HORMONE: CPT | Performed by: NURSE PRACTITIONER

## 2017-04-18 PROCEDURE — 82962 GLUCOSE BLOOD TEST: CPT

## 2017-04-18 PROCEDURE — 83735 ASSAY OF MAGNESIUM: CPT | Performed by: NURSE PRACTITIONER

## 2017-04-18 PROCEDURE — 80048 BASIC METABOLIC PNL TOTAL CA: CPT | Performed by: INTERNAL MEDICINE

## 2017-04-18 PROCEDURE — 99222 1ST HOSP IP/OBS MODERATE 55: CPT | Performed by: NURSE PRACTITIONER

## 2017-04-18 PROCEDURE — 83880 ASSAY OF NATRIURETIC PEPTIDE: CPT | Performed by: NURSE PRACTITIONER

## 2017-04-18 PROCEDURE — 63710000001 INSULIN ASPART PER 5 UNITS: Performed by: INTERNAL MEDICINE

## 2017-04-18 PROCEDURE — 93225 XTRNL ECG REC<48 HRS REC: CPT

## 2017-04-18 PROCEDURE — 93226 XTRNL ECG REC<48 HR SCAN A/R: CPT

## 2017-04-18 PROCEDURE — 84481 FREE ASSAY (FT-3): CPT | Performed by: NURSE PRACTITIONER

## 2017-04-18 PROCEDURE — 84439 ASSAY OF FREE THYROXINE: CPT | Performed by: NURSE PRACTITIONER

## 2017-04-18 PROCEDURE — 97161 PT EVAL LOW COMPLEX 20 MIN: CPT

## 2017-04-18 PROCEDURE — 97110 THERAPEUTIC EXERCISES: CPT

## 2017-04-18 RX ORDER — NICOTINE POLACRILEX 4 MG
15 LOZENGE BUCCAL
Status: DISCONTINUED | OUTPATIENT
Start: 2017-04-18 | End: 2017-04-20 | Stop reason: HOSPADM

## 2017-04-18 RX ORDER — FINASTERIDE 5 MG/1
5 TABLET, FILM COATED ORAL DAILY
Status: DISCONTINUED | OUTPATIENT
Start: 2017-04-18 | End: 2017-04-20 | Stop reason: HOSPADM

## 2017-04-18 RX ORDER — CITALOPRAM 10 MG/1
10 TABLET ORAL DAILY
Status: DISCONTINUED | OUTPATIENT
Start: 2017-04-18 | End: 2017-04-20 | Stop reason: HOSPADM

## 2017-04-18 RX ORDER — DEXTROSE MONOHYDRATE 25 G/50ML
25 INJECTION, SOLUTION INTRAVENOUS
Status: DISCONTINUED | OUTPATIENT
Start: 2017-04-18 | End: 2017-04-20 | Stop reason: HOSPADM

## 2017-04-18 RX ORDER — PANTOPRAZOLE SODIUM 40 MG/1
40 TABLET, DELAYED RELEASE ORAL
Status: DISCONTINUED | OUTPATIENT
Start: 2017-04-19 | End: 2017-04-20 | Stop reason: HOSPADM

## 2017-04-18 RX ORDER — PANTOPRAZOLE SODIUM 40 MG/1
40 TABLET, DELAYED RELEASE ORAL
Status: DISCONTINUED | OUTPATIENT
Start: 2017-04-18 | End: 2017-04-18

## 2017-04-18 RX ORDER — LEVOTHYROXINE SODIUM 0.1 MG/1
100 TABLET ORAL
Status: DISCONTINUED | OUTPATIENT
Start: 2017-04-18 | End: 2017-04-20 | Stop reason: HOSPADM

## 2017-04-18 RX ADMIN — INSULIN ASPART 4 UNITS: 100 INJECTION, SOLUTION INTRAVENOUS; SUBCUTANEOUS at 12:09

## 2017-04-18 RX ADMIN — SODIUM CHLORIDE 125 ML/HR: 9 INJECTION, SOLUTION INTRAVENOUS at 08:06

## 2017-04-18 RX ADMIN — ACETAMINOPHEN 650 MG: 325 TABLET ORAL at 08:05

## 2017-04-18 RX ADMIN — FINASTERIDE 5 MG: 5 TABLET, FILM COATED ORAL at 14:01

## 2017-04-18 RX ADMIN — SODIUM CHLORIDE 75 ML/HR: 9 INJECTION, SOLUTION INTRAVENOUS at 16:07

## 2017-04-18 RX ADMIN — CITALOPRAM 10 MG: 10 TABLET ORAL at 12:45

## 2017-04-18 RX ADMIN — LEVOTHYROXINE SODIUM 100 MCG: 100 TABLET ORAL at 12:45

## 2017-04-18 NOTE — CONSULTS
"[facility]      Patient ID: Haris Sweeney   Age: 87 y.o.  Sex: male  :  1929  MRN: [id number]                LOS: 1 day   Patient Care Team:  Srikanth Madsen MD as PCP - General (Internal Medicine)      Referring MD:  Mandeep Asif MD    .reason for consult: Hypertension with history of A. fib now off anticoagulation    Chief Complaint   Patient presents with   • Fall   • Syncope     Pt states \"I got to the doorway to go to the bathroom and that's all I remember.  I'm not sure if I hit my head or not.\"     • Back Pain     Pt states that he does have a hx of broken ribs; that he was experiencing pain in that area at this time   • Hypotension     Pt states that he has been \"having low blood pressure this week.  But I took my blood pressure pill anyway because my heart rate was up.\"  Pt did provide log of pressure       Admitting Diagnosis (chief complaint):  <principal problem not specified>    HPI       Subjective     Review of Systems   Constitution: Positive for weakness and malaise/fatigue. Negative for chills, decreased appetite, fever, weight gain and weight loss.   HENT: Negative for congestion, ear pain, headaches, hearing loss, nosebleeds and sore throat.    Eyes: Negative for blurred vision and double vision.   Cardiovascular: Positive for syncope. Negative for chest pain, claudication, cyanosis, dyspnea on exertion, irregular heartbeat, leg swelling, near-syncope, orthopnea, palpitations and paroxysmal nocturnal dyspnea.   Respiratory: Positive for cough. Negative for shortness of breath and snoring.         Scant light brown phlegm.  \"No bleeding \"   Endocrine: Negative for cold intolerance.   Hematologic/Lymphatic: Positive for bleeding problem. Negative for adenopathy. Bruises/bleeds easily.   Skin: Negative for rash.   Musculoskeletal: Positive for joint pain. Negative for back pain.   Gastrointestinal: Negative for abdominal pain, change in bowel habit, constipation, " diarrhea, jaundice, melena and nausea.   Genitourinary: Negative for dysuria, frequency, hematuria and hesitancy.   Neurological: Positive for dizziness and light-headedness. Negative for disturbances in coordination, excessive daytime sleepiness, focal weakness and loss of balance.   Psychiatric/Behavioral: Negative for memory loss. The patient is not nervous/anxious.    Allergic/Immunologic: Negative for hives.       Past Medical History:   Diagnosis Date   • A-fib    • Bladder cancer    • Kidney stones    • Macular degeneration    • Thyroid goiter        Past Surgical History:   Procedure Laterality Date   • CATARACT EXTRACTION     • CATARACT EXTRACTION     • THYROIDECTOMY     • TOTAL THYROIDECTOMY         Social History     Social History   • Marital status:      Spouse name: N/A   • Number of children: N/A   • Years of education: N/A     Occupational History   • Not on file.     Social History Main Topics   • Smoking status: Former Smoker     Types: Cigarettes   • Smokeless tobacco: Never Used   • Alcohol use No   • Drug use: No   • Sexual activity: Not on file     Other Topics Concern   • Not on file     Social History Narrative       Family History   Problem Relation Age of Onset   • Heart disease Mother    • No Known Problems Father    • Prostate cancer Brother        History of Present Illness  This is a 87-year-old white male, known by our service Dr. BRANDO Mccann, with a history of persistent atrial fibrillation on chronic anticoagulation with Coumadin, total thyroidectomy on replacement therapy, former smoker, who presents for diagnosis syncope and collapse.  Cardiology consultation asked to evaluate hypertension with history of A. fib now off anticoagulation.  Last seen in office November 2016  By Dr Mccann.  At that time clinically stable, no angina.  Not much CHF.  Ordered echocardiogram with Doppler to evaluate his LVEF.  Added low-dose ARB at that time.  Reported recently he was  "feeling lousy kind of like flu-type symptoms he thought.  Visited his primary care provider, Dr. Madsen on 4/6 and was told to stop diabetes medicine because his blood sugars running low.  He states has been off Coreg ×5 weeks due to low blood pressure herself.  He is followed better, however had still generalized weakness with associated running low blood pressure.  He states yesterday was on his way walking to the bathroom and bedroom and had dizziness while in the doorway and fell and hit his head. Systolic blood pressure 66 by home monitor.  I was reported to have low sodium, low blood pressure, elevated creatinine.  Nephrology was consulted.  To optimize his fluid status and electrolytes.  In ER, underwent extensive laboratory studies and testing.  CT ordered reports acute intracranial hemorrhage. Denies any bleeding, no dark tarry stools or dark urine.  He has a cough, light brown, scant amount \"  No bleeding\".  Generalized body achiness.  He states is feeling better.  He denies any associated chest pain or chest discomfort.  No palpitations, swelling, recent change in weight.  INR/Coumadin management followed by followed by [facility] cardiologist.  Dr Godfrey.  He states  When visits [facility], sees his cardiologist Dr. Mccann.    Cardiac risk factors: Positive for diabetes.  Positive for hypertension.  Unknown cholesterol.          Allergies   Allergen Reactions   • Digoxin And Related    • Keflex [Cephalexin]             Current Meds:   Current Facility-Administered Medications   Medication Dose Route Frequency Provider Last Rate Last Dose   • acetaminophen (TYLENOL) tablet 650 mg  650 mg Oral Q4H PRN Mandeep Asif MD   650 mg at 04/18/17 0805    Or   • acetaminophen (TYLENOL) suppository 650 mg  650 mg Rectal Q4H PRN Mandeep Asif MD       • aluminum-magnesium hydroxide-simethicone (MAALOX/MYLANTA) suspension 30 mL  30 mL Oral Q6H PRN Mandeep Asif MD       • bisacodyl " (DULCOLAX) EC tablet 5 mg  5 mg Oral Daily PRN Mandeep Asif MD       • bisacodyl (DULCOLAX) suppository 10 mg  10 mg Rectal Daily PRN Mandeep Asif MD       • citalopram (CeleXA) tablet 10 mg  10 mg Oral Daily Primo Salguero MD   10 mg at 04/18/17 1245   • dextrose (D50W) solution 25 g  25 g Intravenous Q15 Min PRN Primo Salguero MD       • dextrose (GLUTOSE) oral gel 15 g  15 g Oral Q15 Min PRN Primo Salguero MD       • finasteride (PROSCAR) tablet 5 mg  5 mg Oral Daily Primo Salguero MD   5 mg at 04/18/17 1401   • glucagon (human recombinant) (GLUCAGEN DIAGNOSTIC) injection 1 mg  1 mg Subcutaneous Q15 Min PRN Primo Salguero MD       • insulin aspart (novoLOG) injection 0-9 Units  0-9 Units Subcutaneous 4x Daily With Meals & Nightly Primo Salguero MD   4 Units at 04/18/17 1209   • ipratropium-albuterol (DUO-NEB) nebulizer solution 3 mL  3 mL Nebulization Q6H PRN Mandeep Asif MD       • levothyroxine (SYNTHROID, LEVOTHROID) tablet 100 mcg  100 mcg Oral Q AM Primo Salguero MD   100 mcg at 04/18/17 1245   • ondansetron (ZOFRAN) tablet 4 mg  4 mg Oral Q6H PRN Mandeep Asif MD        Or   • ondansetron ODT (ZOFRAN-ODT) disintegrating tablet 4 mg  4 mg Oral Q6H PRN Mandeep Asif MD        Or   • ondansetron (ZOFRAN) injection 4 mg  4 mg Intravenous Q6H PRN Mandeep Asif MD       • [START ON 4/19/2017] pantoprazole (PROTONIX) EC tablet 40 mg  40 mg Oral Q AM Primo Salguero MD       • sodium chloride 0.9 % flush 1-10 mL  1-10 mL Intravenous PRN Mandeep Asif MD       • sodium chloride 0.9 % flush 10 mL  10 mL Intravenous PRN Logan Cabral MD       • sodium chloride 0.9 % infusion  125 mL/hr Intravenous Continuous Mandeep Asif  mL/hr at 04/18/17 0806 125 mL/hr at 04/18/17 0806         Medication Review:     citalopram 10 mg Oral Daily   finasteride 5 mg Oral Daily   insulin aspart 0-9 Units Subcutaneous 4x Daily With Meals & Nightly   levothyroxine 100 mcg Oral Q  "AM   [START ON 4/19/2017] pantoprazole 40 mg Oral Q AM         Objective     Vital Signs  Temp:  [97.6 °F (36.4 °C)-98.1 °F (36.7 °C)] 97.9 °F (36.6 °C)  Heart Rate:  [73-99] 73  Resp:  [16-18] 18  BP: ()/(47-74) 97/64    BP 97/64  Pulse 73  Temp 97.9 °F (36.6 °C) (Oral)   Resp 18  Ht 64\" (162.6 cm)  Wt 158 lb 11.7 oz (72 kg)  SpO2 98%  BMI 27.25 kg/m2    OBJNOHEADERBEGIN@ Physical Exam   Constitutional: He is oriented to person, place, and time. He appears well-developed and well-nourished. No distress.   HENT:   Head: Normocephalic and atraumatic.   Right Ear: External ear normal.   Left Ear: External ear normal.   Mouth/Throat: Oropharynx is clear and moist. No oropharyngeal exudate.   Eyes: Conjunctivae and EOM are normal. Pupils are equal, round, and reactive to light. No scleral icterus.   Neck: Normal range of motion. Neck supple. No JVD present. No tracheal deviation present. No thyromegaly present.   Cardiovascular: Normal rate, regular rhythm, S1 normal, S2 normal, normal heart sounds and intact distal pulses.  PMI is not displaced.  Exam reveals no gallop, no distant heart sounds, no friction rub and no decreased pulses.    No murmur heard.  Pulmonary/Chest: Effort normal and breath sounds normal. No accessory muscle usage. No respiratory distress. He has no wheezes. He has no rales. He exhibits no tenderness.   Abdominal: Soft. Bowel sounds are normal. He exhibits no distension and no mass. There is no tenderness. There is no rebound and no guarding.   Musculoskeletal: Normal range of motion. He exhibits no edema, tenderness or deformity.   Lymphadenopathy:     He has no cervical adenopathy.   Neurological: He is alert and oriented to person, place, and time. He has normal reflexes. No cranial nerve deficit. Coordination normal.   Skin: Skin is dry. No rash noted. He is not diaphoretic. No erythema. No pallor.   Psychiatric: He has a normal mood and affect.         WEIGHTS:     Wt Readings " from Last 1 Encounters:   04/17/17 2306 158 lb 11.7 oz (72 kg)   04/17/17 1912 153 lb (69.4 kg)       Results Review:     I reviewed the patient's new clinical results    LABS:  Lab Results   Component Value Date    CALCIUM 7.9 (L) 04/18/2017   @LABRCNTIP(MG:3,NA:3,K:3,CL:3,co2:3,bun:3,  creatinine:3,labglom:3,glucose,calcium:3,WBC:3,HGB:3,PLT:3,ALT:3,AST:3)@  Lab Results   Component Value Date    TROPONINT 0.035 (H) 04/17/2017     Estimated Creatinine Clearance: 41.5 mL/min (by C-G formula based on Cr of 1.14).  No results found for: CHOL  Lab Results   Component Value Date    HDL 49 10/05/2016     Lab Results   Component Value Date     (H) 10/05/2016     Lab Results   Component Value Date    TRIG 92 10/05/2016     No components found for: CHOLHDL  @RESUFAST(GLUCOSE,BUN,CREATININE,EGFRIFNONA,EGFRIFAFRI,BCR,  SODIUM,POTASSIUM,CHLORIDE,CO2,CALCIUM,PROTENTOTREF,ALBUMIN,  GLOBULIN,LABIL2,BILIRUBIN,ALK PHOS,AST,ALT)@  @RESUFAST(GLUCOSE,CALCIUM,NA,K,CO2,CL,BUN,CREATININE,EGFRIFAFRI,  EGFRIFNONA,BCR,ANIONGAP)@  Lab Results   Component Value Date    WBC 8.97 04/17/2017    HGB 14.7 04/17/2017    HCT 44.1 04/17/2017    MCV 99.8 (H) 04/17/2017     04/17/2017     No results found for: DDIMER  Lab Results   Component Value Date    TSH 0.413 10/18/2016     No results found for: CKTOTAL  No results found for: DIGOXIN  Lab Results   Component Value Date    INR 1.19 (H) 04/18/2017    INR 1.65 (H) 04/17/2017    INR 1.3 (H) 01/20/2014    PROTIME 14.7 (H) 04/18/2017    PROTIME 19.0 (H) 04/17/2017    PROTIME 13.9 (H) 01/20/2014     Estimated Creatinine Clearance: 41.5 mL/min (by C-G formula based on Cr of 1.14).      Results from last 7 days  Lab Units 04/17/17 2011   TROPONIN T ng/mL 0.035*           Results from last 7 days  Lab Units 04/18/17  1318   SODIUM mmol/L 137   POTASSIUM mmol/L 4.2   BUN mg/dL 38*   CREATININE mg/dL 1.14   CALCIUM mg/dL 7.9*     @LABRCNTIPbnp@    Results from last 7 days  Lab Units  04/17/17 2011   WBC 10*3/mm3 8.97   HEMOGLOBIN g/dL 14.7   HEMATOCRIT % 44.1   PLATELETS 10*3/mm3 174       Results from last 7 days  Lab Units 04/18/17  0546 04/17/17 2011   INR  1.19* 1.65*       Lab Results (last 24 hours)     Procedure Component Value Units Date/Time    Protime-INR [[id number]]  (Abnormal) Collected:  04/17/17 2011    Specimen:  Blood from Arm, Right Updated:  04/17/17 2031     Protime 19.0 (H) Seconds      INR 1.65 (H)    CBC & Differential [[id number]] Collected:  04/17/17 2011    Specimen:  Blood Updated:  04/17/17 2038    Narrative:       The following orders were created for panel order CBC & Differential.  Procedure                               Abnormality         Status                     ---------                               -----------         ------                     CBC Auto Differential[[id number]]         Abnormal            Final result                 Please view results for these tests on the individual orders.    CBC Auto Differential [[id number]]  (Abnormal) Collected:  04/17/17 2011    Specimen:  Blood from Arm, Right Updated:  04/17/17 2038     WBC 8.97 10*3/mm3      RBC 4.42 (L) 10*6/mm3      Hemoglobin 14.7 g/dL      Hematocrit 44.1 %      MCV 99.8 (H) fL      MCH 33.3 (H) pg      MCHC 33.3 g/dL      RDW 14.8 (H) %      RDW-SD 54.4 (H) fl      MPV 10.7 fL      Platelets 174 10*3/mm3      Neutrophil % 73.9 %      Lymphocyte % 15.5 (L) %      Monocyte % 9.5 %      Eosinophil % 0.6 %      Basophil % 0.1 %      Immature Grans % 0.4 %      Neutrophils, Absolute 6.63 10*3/mm3      Lymphocytes, Absolute 1.39 10*3/mm3      Monocytes, Absolute 0.85 10*3/mm3      Eosinophils, Absolute 0.05 10*3/mm3      Basophils, Absolute 0.01 10*3/mm3      Immature Grans, Absolute 0.04 (H) 10*3/mm3     Comprehensive Metabolic Panel [[id number]]  (Abnormal) Collected:  04/17/17 2011    Specimen:  Blood from Arm, Right Updated:  04/17/17 2100     Glucose 117 (H) mg/dL      BUN 49 (H) mg/dL       Creatinine 1.51 (H) mg/dL      Sodium 133 (L) mmol/L      Potassium 5.4 (H) mmol/L      Chloride 94 (L) mmol/L      CO2 26.8 mmol/L      Calcium 8.9 mg/dL      Total Protein 6.2 g/dL      Albumin 3.00 (L) g/dL      ALT (SGPT) 21 U/L      AST (SGOT) 30 U/L      Alkaline Phosphatase 65 U/L      Total Bilirubin 0.4 mg/dL      eGFR Non African Amer 44 (L) mL/min/1.73      Globulin 3.2 gm/dL      A/G Ratio 0.9 g/dL      BUN/Creatinine Ratio 32.5 (H)     Anion Gap 12.2 mmol/L     Narrative:       The MDRD GFR formula is only valid for adults with stable renal function between ages 18 and 70.    Troponin [[id number]]  (Abnormal) Collected:  04/17/17 2011    Specimen:  Blood from Arm, Right Updated:  04/17/17 2103     Troponin T 0.035 (H) ng/mL     Narrative:       Troponin T Reference Ranges:  Less than 0.03 ng/mL:    Negative for AMI  0.03 to 0.09 ng/mL:      Indeterminant for AMI  Greater than 0.09 ng/mL: Positive for AMI    POC Glucose Fingerstick [[id number]]  (Normal) Collected:  04/17/17 2238    Specimen:  Blood Updated:  04/17/17 2239     Glucose 116 mg/dL     Narrative:       Meter: [id number] : [id number] Cecil Laurence    Lavender Top [[id number]] Collected:  04/17/17 2011    Specimen:  Blood from Arm, Right Updated:  04/18/17 0101     Extra Tube hold for add-on      Auto resulted       Green Top (Gel) [[id number]] Collected:  04/17/17 2011    Specimen:  Blood from Arm, Right Updated:  04/18/17 0101     Extra Tube Hold for add-ons.      Auto resulted.       Gold Top - SST [[id number]] Collected:  04/17/17 2011    Specimen:  Blood from Arm, Right Updated:  04/18/17 0101     Extra Tube Hold for add-ons.      Auto resulted.       [place] Draw [[id number]] Collected:  04/17/17 2011    Specimen:  Blood Updated:  04/18/17 0101    Narrative:       The following orders were created for panel order [place] Draw.  Procedure                               Abnormality         Status                     ---------                                -----------         ------                     Light Blue Top[[id number]]                                    Final result               Green Top (Gel)[[id number]]                                   Final result               Lavender Top[[id number]]                                      Final result               Gold Top - SST[[id number]]                                    Final result                 Please view results for these tests on the individual orders.    Light Blue Top [[id number]] Collected:  04/17/17 2011    Specimen:  Blood from Arm, Right Updated:  04/18/17 0101     Extra Tube hold for add-on      Auto resulted       Protime-INR [[id number]]  (Abnormal) Collected:  04/18/17 0546    Specimen:  Blood Updated:  04/18/17 0649     Protime 14.7 (H) Seconds      INR 1.19 (H)    POC Glucose Fingerstick [[id number]]  (Abnormal) Collected:  04/18/17 0742    Specimen:  Blood Updated:  04/18/17 0744     Glucose 176 (H) mg/dL     Narrative:       Meter: [id number] : [id number] Vero Frank    POC Glucose Fingerstick [[id number]]  (Abnormal) Collected:  04/18/17 1125    Specimen:  Blood Updated:  04/18/17 1127     Glucose 223 (H) mg/dL     Narrative:       Meter: [id number] : [id number] Nimesh Montenegro    Basic Metabolic Panel [[id number]]  (Abnormal) Collected:  04/18/17 1318    Specimen:  Blood Updated:  04/18/17 1401     Glucose 179 (H) mg/dL      BUN 38 (H) mg/dL      Creatinine 1.14 mg/dL      Sodium 137 mmol/L      Potassium 4.2 mmol/L      Chloride 101 mmol/L      CO2 22.7 mmol/L      Calcium 7.9 (L) mg/dL      eGFR Non African Amer 61 mL/min/1.73      BUN/Creatinine Ratio 33.3 (H)     Anion Gap 13.3 mmol/L     Narrative:       The MDRD GFR formula is only valid for adults with stable renal function between ages 18 and 70.          Imaging Results (last 72 hours)     Procedure Component Value Units Date/Time    CT Head Without Contrast [[id number]] Collected:  04/17/17 2051     Updated:  04/17/17 2059     Narrative:       CT SCAN OF THE HEAD WITHOUT CONTRAST     CLINICAL HISTORY: Syncope.     TECHNIQUE: Transverse 3 mm thick images were acquired from the base of  the skull to the vertex without IV contrast.     COMPARISON: None     FINDINGS: There is mild diffuse cortical atrophy. There is a small focus  of acute subarachnoid blood within a sulcus in the anterior aspect of  the right temporal lobe. Minimal subarachnoid hemorrhage is also evident  within several sulci in the superior aspects of both frontal lobes. No  other areas of intracranial hemorrhage are identified. There is no mass  effect. There is no evidence of recent or old infarct. Bone window  images demonstrate no skull fractures. 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       Impression:       Minimal acute subarachnoid hemorrhage in the right temporal  lobe and both frontal lobes as described. There is no associated mass  effect. This is quite likely posttraumatic subarachnoid hemorrhage.     This report was finalized on 4/17/2017 8:55 PM by Dr. Buddy Chand MD.       XR Chest 2 View [[id number]] Collected:  04/17/17 2117     Updated:  04/17/17 2117    Narrative:       CHEST PA AND LATERAL.     HISTORY: Fall, back pain, atrial fibrillation.     COMPARISON: No prior studies for comparison.     FINDINGS:  Cardiomediastinal silhouette is within normal limits.         There is no consolidation or effusion. Chronic lung changes are present.  Old compression fracture of T8.          Impression:       No acute findings.           XR Spine Thoracic 3 View [[id number]] Collected:  04/17/17 2115     Updated:  04/17/17 2117    Narrative:       X-RAY THORACIC SPINE 3 VIEWS.     HISTORY: Fall, upper back pain.     COMPARISON: No prior studies for comparison.     FINDINGS:  Vertebral body height is normal, no acute fracture.  Multilevel loss of  intervertebral disc height and spurring, calcification of the  anterior  longitudinal ligament, a diagnosis of DISH may be considered. There is  an old compression fracture of T8.     Prevertebral soft tissues are unremarkable.       Impression:       No acute fracture or subluxation of the thoracic spine. Old fracture of  T8.       CT Head Without Contrast [[id number]] Collected:  04/18/17 0445     Updated:  04/18/17 0510    Narrative:       CT SCAN OF THE BRAIN WITHOUT CONTRAST.     TECHNIQUE: Multiple axial images of the brain were obtained from the  vertex to the base of the brain.     HISTORY:  Intracranial hemorrhage, follow-up.     COMPARISON:  04/17/2017.     FINDINGS:   Midline structures are within normal limits, there is no hydrocephalus.  Gray-white matter differentiation is maintained. The previously seen  acute subarachnoid blood layering the right temporal lobe is unchanged.     Complete resolution of previously seen left frontal lobe subarachnoid  hemorrhage, minimal blood remains. No significant hemorrhage is seen in  the right frontal lobe. There are chronic hygromas present bilaterally.     Orbits are within normal limits. No significant mucosal disease of the  para-nasal sinuses. Calcified 2.8 x 1.3 cm lesion of the right frontal  sinus is unchanged, it may be an osteoma or calcified secretions.  Mastoid air cells are well aerated.             Impression:       Persistent small amount of acute right temporal subarachnoid blood.   Decreased amount of left frontal subarachnoid hemorrhage, follow-up is  recommended.                   Imaging Results (all)     Procedure Component Value Units Date/Time    CT Head Without Contrast [[id number]] Collected:  04/17/17 2051     Updated:  04/17/17 2059    Narrative:       CT SCAN OF THE HEAD WITHOUT CONTRAST     CLINICAL HISTORY: Syncope.     TECHNIQUE: Transverse 3 mm thick images were acquired from the base of  the skull to the vertex without IV contrast.     COMPARISON: None     FINDINGS: There is mild diffuse cortical  atrophy. There is a small focus  of acute subarachnoid blood within a sulcus in the anterior aspect of  the right temporal lobe. Minimal subarachnoid hemorrhage is also evident  within several sulci in the superior aspects of both frontal lobes. No  other areas of intracranial hemorrhage are identified. There is no mass  effect. There is no evidence of recent or old infarct. Bone window  images demonstrate no skull fractures. 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       Impression:       Minimal acute subarachnoid hemorrhage in the right temporal  lobe and both frontal lobes as described. There is no associated mass  effect. This is quite likely posttraumatic subarachnoid hemorrhage.     This report was finalized on 4/17/2017 8:55 PM by Dr. Buddy Chand MD.       XR Chest 2 View [[id number]] Collected:  04/17/17 2117     Updated:  04/17/17 2117    Narrative:       CHEST PA AND LATERAL.     HISTORY: Fall, back pain, atrial fibrillation.     COMPARISON: No prior studies for comparison.     FINDINGS:  Cardiomediastinal silhouette is within normal limits.         There is no consolidation or effusion. Chronic lung changes are present.  Old compression fracture of T8.          Impression:       No acute findings.           XR Spine Thoracic 3 View [[id number]] Collected:  04/17/17 2115     Updated:  04/17/17 2117    Narrative:       X-RAY THORACIC SPINE 3 VIEWS.     HISTORY: Fall, upper back pain.     COMPARISON: No prior studies for comparison.     FINDINGS:  Vertebral body height is normal, no acute fracture.  Multilevel loss of  intervertebral disc height and spurring, calcification of the anterior  longitudinal ligament, a diagnosis of DISH may be considered. There is  an old compression fracture of T8.     Prevertebral soft tissues are unremarkable.       Impression:       No acute fracture or subluxation of the thoracic spine. Old fracture of  T8.       CT Head  Without Contrast [[id number]] Collected:  04/18/17 0445     Updated:  04/18/17 0510    Narrative:       CT SCAN OF THE BRAIN WITHOUT CONTRAST.     TECHNIQUE: Multiple axial images of the brain were obtained from the  vertex to the base of the brain.     HISTORY:  Intracranial hemorrhage, follow-up.     COMPARISON:  04/17/2017.     FINDINGS:   Midline structures are within normal limits, there is no hydrocephalus.  Gray-white matter differentiation is maintained. The previously seen  acute subarachnoid blood layering the right temporal lobe is unchanged.     Complete resolution of previously seen left frontal lobe subarachnoid  hemorrhage, minimal blood remains. No significant hemorrhage is seen in  the right frontal lobe. There are chronic hygromas present bilaterally.     Orbits are within normal limits. No significant mucosal disease of the  para-nasal sinuses. Calcified 2.8 x 1.3 cm lesion of the right frontal  sinus is unchanged, it may be an osteoma or calcified secretions.  Mastoid air cells are well aerated.             Impression:       Persistent small amount of acute right temporal subarachnoid blood.   Decreased amount of left frontal subarachnoid hemorrhage, follow-up is  recommended.                   ECG/EMG Results (last 24 hours)     Procedure Component Value Units Date/Time    ECG 12 Lead [[id number]] Collected:  04/17/17 1953     Updated:  04/17/17 2106    Narrative:       RR Interval= 571 ms  SC Interval= ms  QRSD Interval= 86 ms  QT Interval= 364 ms  QTc Interval= 482 ms  Heart Rate= 105 ms  P Axis= deg  QRS Axis= -30 deg  T Wave Axis= 52 deg  I: 40 Axis= -17 deg  T: 40 Axis= -53 deg  ST Axis= 180 deg  ATRIAL FIBRILLATION WITH RAPID V-RATE  LEFT AXIS DEVIATION  BORDERLINE PROLONGED QT INTERVAL  SINCE PREVIOUS TRACING,  VENTRICULAR RATE INCREASED 33 BPM  Electronically Signed by:  Minesh Almazan ([facility]) 17-Apr-2017 21:02:09  Date and Time of Study: 2017-04-17 19:53:58          Echocardiogram with  Doppler 11/16/16  Interpretation Summary      · All left ventricular wall segments contract normally.  · Left ventricular diastolic dysfunction (grade I) consistent with impaired relaxation.  · Left atrial cavity size is mild-to-moderately dilated.  · Mild tricuspid valve regurgitation is present.  · The aortic valve is abnormal in structure. The valve exhibits sclerosis. A calcific nodule noted on the non-coronarycusp. no stenosis         EKG  4/17/17    Assessment:         [unfilled]    Active Problems:    Chronic anticoagulation    Traumatic subarachnoid hemorrhage with loss of consciousness of 30 minutes or less    Syncope and collapse      Assessment/Plan   Syncope and collapse: LOC 30 min  Hypotension (hypertension with history of A. fib now off anticoagulation)- running low range SBP 90s to 107  Atrial fibrillation with RVR and takes anticoagulation-Coumadin on hold. VR controlled  Anticoagulation-on hold.  INR dropping.  yesterday evening of 1.65. Today 1.19 this morning   Indeterminate troponin-0.035 on 4/17 at 2011  Nonischemic cardiomyopathy  Hyperlipidemia  Dizziness  Had a fall.    Denies any chest pain or chest discomfort.  Initial troponin on 4/17 2011 of 0.035 dizziness.  Had a fall.  Difficult situation in the setting of A. fib without anticoagulation I have discussed with the patient the risks off anticoagulation therapy which include increased risk of systemic embolization such as stroke.  On anticoagulation at this time, possible risk for adverse worsening hemorrhage/bleeding The patient verbalize understanding.   His syncope can be multifactorial in origin consider including side effect from medicines, hypovolemia, low blood pressure, A. fib.  Neuro consult suggests his syncopal risk needs to be addressed prior to restarting Coumadin.     Will obtain Holter monitor to evaluate syncope and collapse and check for any significant arrhythmia and/or pause.    He had echocardiogram with Doppler November  2016 with normal LV systolic function and wall motion.  He denies any chest pain or chest discomfort.    Will consider Cardiolite stress test when clinically stable to evaluate for possible ischemic.  EKG shows no acute ischemic ST T changes.     We'll monitor troponin, cardiac enzymes and EKG.  Monitor proBNP, JVP, BMP and weight.    Ongoing management by Dr. Mcclellan. CORTNEY MOSQUERA      I not only counseled the patient today on the significant factors noted in the assessment and plan, but I also recommended that the patient reduce salt and saturated animal fat intake in diet, as well as to perform scheduled exercise on a regular basis.             Cyndi Berry, EVELINE  04/18/17  2:10 PM     I agree with above note.  I personally carried out physical exam, reviewed all pertinent clinical data, discussed assessment with APRN, then determined optimal plan.    I have also discussed the assessment and plan with family.    The patient is a 87-year-old white male with chronic atrial fibrillation on Coumadin, history of thyroidectomy on replacement therapy, who presents for syncope.  He is been keeping track of his blood pressure heart rate readings at home and noted that his blood pressure was so low that he quit taking Coreg on his own.  His blood sugar then got low and the stopped his diabetes medication.  He then developed a virus possibly the flu about 5 days prior to admission.  He became very weak and thought he was dehydrated.  His sitting blood pressure dropped to as low as 76 systolic.  That is when a passed out.  He was brought to the emergency room where CT showed acute intracranial hemorrhage.  His Coumadin has been stopped.  Agree with holding the Coumadin until okayed to restart by neurosurgery.    No new medicines were added that could cause his blood pressure to drop.  He had been losing weight again became dehydrated.  He is receiving IV fluid and his blood pressure now is 130/80.  His last  echocardiogram in November 2016 showed normal ejection fraction, diastolic dysfunction, mild to moderate left atrial enlargement.  An echo will be repeated.  Holter monitor has been placed.    Eleuterio Toscano M.D.  April 18, 2017      BROCK Dragon/Transcription disclaimer:   Much of this encounter note is an electronic transcription/translation of spoken language to printed text. The electronic translation of spoken language may permit erroneous, or at times, nonsensical words or phrases to be inadvertently transcribed; Although I have reviewed the note for such errors, some may still exist.

## 2017-04-18 NOTE — ED PROVIDER NOTES
"EMERGENCY DEPARTMENT ENCOUNTER       CHIEF COMPLAINT  Chief Complaint: Dizziness  History given by: Patient, Family  History limited by: N/A  Room Number: 11/11  PMD: Srikanth Madsen MD      HPI:  HPI Comments: Pt states that he was taken off of his antihypertensives about 6 weeks ago as there was concern for hypotension. Pt was taken off of his diuretics on 04/06/17 by his PMD. Pt c/o intermittent episodes of dizziness described as  \"lightheadedness\" onset about 2-3 days ago. Since onset of his lightheadedness, pt has been monitoring his blood pressures; pt states that his systolic BP has been in the 80s. This afternoon, upon standing up, pt felt lightheaded and briefly lost consciousness afterwards. Pt is unsure if he sustained head injury, but currently c/o diffuse back pain. Pt denies dark/tarry stools, CP, dyspnea, abd pain, focal weakness/numbness, speech/visual difficulties, HA, palpitations, and N/V/D. Pt reports that he is on Coumadin due to h/o A-Fib and took his BP medications this AM. There are no other complaints at this time.     Patient is a 87 y.o. male presenting with dizziness.   Dizziness   Quality:  Lightheadedness  Severity:  Moderate  Onset quality:  Gradual  Duration: onset about 2-3 days ago.  Timing:  Intermittent  Progression:  Waxing and waning  Context: loss of consciousness (pt had a syncopal episode this afternoon) and standing up    Context comment:  Pt reports that his systolic BP has been in the 80s.   Relieved by:  Nothing  Worsened by:  Nothing  Associated symptoms: syncope (this afternoon - resolved)    Associated symptoms: no blood in stool, no chest pain, no diarrhea, no headaches, no nausea, no palpitations, no shortness of breath, no vomiting and no weakness          PAST MEDICAL HISTORY  Active Ambulatory Problems     Diagnosis Date Noted   • Non-insulin dependent type 2 diabetes mellitus 03/29/2016   • Generalized osteoarthritis of multiple sites 03/29/2016   • Depression " 03/29/2016   • A-fib 03/29/2016   • HTN (hypertension) 03/29/2016   • Hypothyroidism 03/29/2016   • Chronic gout 03/29/2016   • GERD (gastroesophageal reflux disease) 03/29/2016   • Encounter for prostate cancer screening 03/29/2016   • Bilateral carpal tunnel syndrome 10/05/2016   • Chronic anticoagulation 10/05/2016   • Paresthesia of both hands 10/05/2016   • Medicare annual wellness visit, subsequent 10/13/2016   • Bilateral hand pain 01/26/2017     Resolved Ambulatory Problems     Diagnosis Date Noted   • No Resolved Ambulatory Problems     Past Medical History:   Diagnosis Date   • Bladder cancer    • Kidney stones    • Thyroid goiter          PAST SURGICAL HISTORY  Past Surgical History:   Procedure Laterality Date   • CATARACT EXTRACTION     • TOTAL THYROIDECTOMY           FAMILY HISTORY  Family History   Problem Relation Age of Onset   • Heart disease Mother    • No Known Problems Father    • Prostate cancer Brother          SOCIAL HISTORY  Social History     Social History   • Marital status:      Spouse name: N/A   • Number of children: N/A   • Years of education: N/A     Occupational History   • Not on file.     Social History Main Topics   • Smoking status: Former Smoker     Types: Cigarettes   • Smokeless tobacco: Never Used   • Alcohol use No   • Drug use: No   • Sexual activity: Not on file     Other Topics Concern   • Not on file     Social History Narrative         ALLERGIES  Digoxin and related and Keflex [cephalexin]        REVIEW OF SYSTEMS  Review of Systems   Constitutional: Negative for chills.   HENT: Negative for congestion, rhinorrhea and sore throat.    Eyes: Negative for pain.   Respiratory: Negative for cough and shortness of breath.    Cardiovascular: Positive for syncope (this afternoon - resolved). Negative for chest pain, palpitations and leg swelling.   Gastrointestinal: Negative for abdominal pain, blood in stool, diarrhea, nausea and vomiting.   Genitourinary: Negative for  difficulty urinating, dysuria, flank pain and frequency.   Musculoskeletal: Positive for back pain. Negative for myalgias, neck pain and neck stiffness.   Skin: Negative for rash.   Neurological: Positive for dizziness (lightheadedness), syncope (this afternoon - now resolved) and light-headedness. Negative for speech difficulty, weakness, numbness and headaches.   Psychiatric/Behavioral: Negative.    All other systems reviewed and are negative.           PHYSICAL EXAM  ED Triage Vitals   Temp Heart Rate Resp BP SpO2   04/17/17 1912 04/17/17 1912 04/17/17 1912 04/17/17 1920 04/17/17 1912   97.6 °F (36.4 °C) 99 18 90/57 93 %      Temp src Heart Rate Source Patient Position BP Location FiO2 (%)   04/17/17 1912 04/17/17 1912 04/17/17 1920 04/17/17 1920 --   Tympanic Monitor Sitting Left arm        Physical Exam   Constitutional: He is oriented to person, place, and time. No distress.   HENT:   Mouth/Throat: Mucous membranes are dry.   Eyes: EOM are normal. Pupils are equal, round, and reactive to light.   Neck: Normal range of motion. Neck supple.   Cardiovascular: Normal rate and normal heart sounds.  An irregularly irregular rhythm present.   Pulmonary/Chest: Effort normal and breath sounds normal. No respiratory distress. He has no decreased breath sounds. He has no wheezes. He has no rhonchi. He has no rales.   Abdominal: Soft. There is no tenderness. There is no rebound and no guarding.   Musculoskeletal: Normal range of motion.   Neurological: He is alert and oriented to person, place, and time. He has normal sensation.   Skin: Skin is warm and dry.   Psychiatric: Mood and affect normal.   Nursing note and vitals reviewed.          LAB RESULTS  Recent Results (from the past 24 hour(s))   Protime-INR    Collection Time: 04/17/17  8:11 PM   Result Value Ref Range    Protime 19.0 (H) 11.7 - 14.2 Seconds    INR 1.65 (H) 0.90 - 1.10   Comprehensive Metabolic Panel    Collection Time: 04/17/17  8:11 PM   Result Value  Ref Range    Glucose 117 (H) 65 - 99 mg/dL    BUN 49 (H) 8 - 23 mg/dL    Creatinine 1.51 (H) 0.76 - 1.27 mg/dL    Sodium 133 (L) 136 - 145 mmol/L    Potassium 5.4 (H) 3.5 - 5.2 mmol/L    Chloride 94 (L) 98 - 107 mmol/L    CO2 26.8 22.0 - 29.0 mmol/L    Calcium 8.9 8.6 - 10.5 mg/dL    Total Protein 6.2 6.0 - 8.5 g/dL    Albumin 3.00 (L) 3.50 - 5.20 g/dL    ALT (SGPT) 21 1 - 41 U/L    AST (SGOT) 30 1 - 40 U/L    Alkaline Phosphatase 65 39 - 117 U/L    Total Bilirubin 0.4 0.1 - 1.2 mg/dL    eGFR Non African Amer 44 (L) >60 mL/min/1.73    Globulin 3.2 gm/dL    A/G Ratio 0.9 g/dL    BUN/Creatinine Ratio 32.5 (H) 7.0 - 25.0    Anion Gap 12.2 mmol/L   Troponin    Collection Time: 04/17/17  8:11 PM   Result Value Ref Range    Troponin T 0.035 (H) 0.000 - 0.030 ng/mL   CBC Auto Differential    Collection Time: 04/17/17  8:11 PM   Result Value Ref Range    WBC 8.97 4.50 - 10.70 10*3/mm3    RBC 4.42 (L) 4.60 - 6.00 10*6/mm3    Hemoglobin 14.7 13.7 - 17.6 g/dL    Hematocrit 44.1 40.4 - 52.2 %    MCV 99.8 (H) 79.8 - 96.2 fL    MCH 33.3 (H) 27.0 - 32.7 pg    MCHC 33.3 32.6 - 36.4 g/dL    RDW 14.8 (H) 11.5 - 14.5 %    RDW-SD 54.4 (H) 37.0 - 54.0 fl    MPV 10.7 6.0 - 12.0 fL    Platelets 174 140 - 500 10*3/mm3    Neutrophil % 73.9 42.7 - 76.0 %    Lymphocyte % 15.5 (L) 19.6 - 45.3 %    Monocyte % 9.5 5.0 - 12.0 %    Eosinophil % 0.6 0.3 - 6.2 %    Basophil % 0.1 0.0 - 1.5 %    Immature Grans % 0.4 0.0 - 0.5 %    Neutrophils, Absolute 6.63 1.90 - 8.10 10*3/mm3    Lymphocytes, Absolute 1.39 0.90 - 4.80 10*3/mm3    Monocytes, Absolute 0.85 0.20 - 1.20 10*3/mm3    Eosinophils, Absolute 0.05 0.00 - 0.70 10*3/mm3    Basophils, Absolute 0.01 0.00 - 0.20 10*3/mm3    Immature Grans, Absolute 0.04 (H) 0.00 - 0.03 10*3/mm3       Ordered the above labs and reviewed the results.        RADIOLOGY  XR Spine Thoracic 3 View   Preliminary Result   No acute fracture or subluxation of the thoracic spine. Old fracture of   T8. Interpreted by  radiologist. Independently viewed by me.             XR Chest 2 View   Preliminary Result   No acute findings. Interpreted by radiologist. Independently viewed by me.             CT Head Without Contrast   Final Result   Minimal acute subarachnoid hemorrhage in the right temporal   lobe and both frontal lobes as described. There is no associated mass   effect. This is quite likely posttraumatic subarachnoid hemorrhage.       This report was finalized on 4/17/2017 8:55 PM by Dr. Buddy Chand MD. Interpreted by radiologist. Discussed with radiologist. Independently viewed by me.                    Ordered the above noted radiological studies. Reviewed by me in PACS.       PROCEDURES  Critical Care  Performed by: CAMPBELL NINA  Authorized by: CAMPBELL NINA   Total critical care time: 30 minutes  Critical care time was exclusive of separately billable procedures and treating other patients.  Critical care was necessary to treat or prevent imminent or life-threatening deterioration of the following conditions: CNS failure or compromise and renal failure.  Critical care was time spent personally by me on the following activities: development of treatment plan with patient or surrogate, discussions with consultants, evaluation of patient's response to treatment, examination of patient, obtaining history from patient or surrogate, ordering and performing treatments and interventions, ordering and review of laboratory studies, ordering and review of radiographic studies, re-evaluation of patient's condition, pulse oximetry and review of old charts.          EKG:           EKG time: 19:53  Rhythm/Rate: A-Fib with rate 105  P waves and CA: None present  QRS, axis: LAD   ST and T waves: Normal ST-T     Interpreted Contemporaneously by me, independently viewed  Unchanged compared to prior 07/23/10          PROGRESS AND CONSULTS  ED Course   Comment By Time   9:36 PM  Patient with hypotension, syncope.  Blood pressures  have been running low.  Low today.  Did take his blood pressure meds.  Patient appears dehydrated.  Given fluids.  Has traumatic SAH on CT scan.  Discussed with Dr. Ward.  Will admit to ICU.  Asking for kcentra. Logan Cabral MD 04/17 2136     8:34 PM: Blood work, CXR, and T-Spine X-Ray ordered for further evaluation. IV fluids ordered to hydrate pt.     8:50 PM: Reviewed pt's old records.     9:14 PM: Rechecked pt. Informed pt that his creatinine is 1.51 as compared to 0.73 about 6 months ago. CT Head shows subarachnoid hemorrhage in the right temporal lobe and bilateral frontal lobes. Need for admission to the ICU. Pt and family agree with plan. Decision time to admit: Now.     9:18 PM: Placed call to neurosurgeon for consult and intensivist for admission.     9:26 PM: Discussed case with Dr. Asif, intensivist. He will admit pt to an ICU bed.     9:27 PM: Case d/w Dr. Ward, neurosurgeon, who will consult. He would like KCentra ordered to reverse Coumadin.     9:35 PM: KCentra ordered as d/w Dr. Ward. Repeat INR ordered also.           MEDICAL DECISION MAKING      MDM  Number of Diagnoses or Management Options  Renal insufficiency:   Syncope and collapse:   Traumatic subarachnoid hemorrhage, with loss of consciousness of 30 minutes or less, initial encounter:      Amount and/or Complexity of Data Reviewed  Clinical lab tests: ordered and reviewed (Hgb is 14.7. Creatinine is 1.51. )  Tests in the radiology section of CPT®: ordered and reviewed (CT Head: Minimal acute subarachnoid hemorrhage in the right temporal lobe and both frontal lobes as described. There is no associated mass effect. This is quite likely posttraumatic subarachnoid hemorrhage)  Tests in the medicine section of CPT®: reviewed and ordered (EKG interpreted.   )  Discussion of test results with the performing providers: yes (CT Head results d/w radiologist.   )  Decide to obtain previous medical records or to obtain history from someone  other than the patient: yes  Review and summarize past medical records: yes (Creatinine was 0.73 about 6 months ago.)  Discuss the patient with other providers: yes (Case d/w Dr. Asif, intensivist, who will admit pt to an ICU bed. Case d/w Dr. Ward, neurosurgeon, who will consult.     )    Patient Progress  Patient progress: stable             DIAGNOSIS  Final diagnoses:   Syncope and collapse   Traumatic subarachnoid hemorrhage, with loss of consciousness of 30 minutes or less, initial encounter   Renal insufficiency         DISPOSITION  Pt admitted to the ICU.      ADMISSION    Discussed treatment plan and reason for admission with pt/family and admitting physician.  Pt/family voiced understanding of the plan for admission for further testing/treatment as needed.           Latest Documented Vital Signs:  As of 9:51 PM  BP- 101/74 HR- 97 Temp- 97.6 °F (36.4 °C) (Tympanic) O2 sat- 98%      --  Documentation assistance provided by tomas Desai for Dr. Marianna MD.  Information recorded by the scribe was done at my direction and has been verified and validated by me.                  Nguyễn Desai  04/17/17 [id number]       Logan Cabral MD  04/17/17 1304

## 2017-04-18 NOTE — PLAN OF CARE
Problem: Patient Care Overview (Adult)  Goal: Plan of Care Review    04/18/17 1046   Coping/Psychosocial Response Interventions   Plan Of Care Reviewed With patient         Problem: Inpatient SLP  Goal: Dysphagia- Patient will safely consume diet as per recommendation with no signs/symptoms of aspiration    04/18/17 1046   Safely Consume Diet   Safely Consume Diet- SLP, Date Established 04/18/17   Safely Consume Diet- SLP, Time to Achieve by discharge

## 2017-04-18 NOTE — CONSULTS
"Patient name: Haris Sweeney  Referring Provider: Dr. Asif  Reason for Consultation: [facility]    Patient Care Team:  Srikanth Madsen MD as PCP - General (Internal Medicine)    Chief complaint: fall    Subjective .     History of present illness:    Patient is a 87 y.o.  male who presents with sycopal episode. This occurred yesterday at home while walking from bedroom to bathroom. It was associated with sense of dizziness prior to syncope. He does not recall falling. He did have brief LOC, but his wife heard him fall and came immediately to his aid. He was awake upon her arrival. He records his BP, HR, and BG daily. The BP and BG have been running low so he self discontinued his BP medication 6 weeks ago and his PCP stopped his diabetic meds 2 weeks ago. He had \"the flu\" last week with decrease in appetite and oral intake. His BG was 186, HR 73-86 and BP 66-76/44 yesterday. He took Amaryl and carvedilol for his \"high\" numbers. He passed out following taking these meds. He is on Coumadin for A-fib. He denies recurrent falls. He has one fall in 2014 when he tripped on sidewalk and hit his head. He was seen in ER but he did not have any hemorrhage.    Review of Systems  Review of Systems   Constitutional: Positive for appetite change and fatigue. Negative for fever and unexpected weight change.   HENT: Negative for trouble swallowing.    Eyes: Negative for visual disturbance.   Respiratory: Negative for shortness of breath.    Cardiovascular: Negative for chest pain.   Gastrointestinal: Negative for nausea and vomiting.   Genitourinary: Negative for enuresis.   Musculoskeletal: Positive for back pain. Negative for gait problem.   Neurological: Positive for dizziness. Negative for speech difficulty, weakness, numbness and headaches.   Hematological: Bruises/bleeds easily.   Psychiatric/Behavioral: Negative for confusion.       History  PAST MEDICAL HISTORY  Past Medical History:   Diagnosis Date   • A-fib    • Bladder " cancer    • Kidney stones    • Macular degeneration    • Thyroid goiter        PAST SURGICAL HISTORY  Past Surgical History:   Procedure Laterality Date   • CATARACT EXTRACTION     • CATARACT EXTRACTION     • THYROIDECTOMY     • TOTAL THYROIDECTOMY         FAMILY HISTORY  Family History   Problem Relation Age of Onset   • Heart disease Mother    • No Known Problems Father    • Prostate cancer Brother        SOCIAL HISTORY  Social History   Substance Use Topics   • Smoking status: Former Smoker     Types: Cigarettes   • Smokeless tobacco: Never Used   • Alcohol use No       retired    Allergies:  Digoxin and related and Keflex [cephalexin]    MEDICATIONS:  Prescriptions Prior to Admission   Medication Sig Dispense Refill Last Dose   • allopurinol (ZYLOPRIM) 100 MG tablet TAKE 1 TABLET BY MOUTH 2 (TWO) TIMES A DAY. 180 tablet 3 4/17/2017 at 0900   • B Complex Vitamins (VITAMIN B COMPLEX PO) Take  by mouth.   4/17/2017 at 0900   • carvedilol (COREG) 6.25 MG tablet Take 6.25 mg by mouth 2 (Two) Times a Day With Meals.   4/17/2017 at 0900   • citalopram (CeleXA) 10 MG tablet Take 1 tablet by mouth daily. TAKE 1 TABLET BY MOUTH DAILY 90 tablet 2 4/16/2017 at 2100   • finasteride (PROSCAR) 5 MG tablet Take 5 mg by mouth Daily.   4/17/2017 at 0900   • fluticasone (FLONASE) 50 MCG/ACT nasal spray INHALE 2 SPRAYS INTO EACH NOSTRIL DAILY 16 mL 0 4/17/2017 at 0900   • furosemide (LASIX) 40 MG tablet TAKE 1 TABLET BY MOUTH 2 (TWO) TIMES A DAY. 180 tablet 2 4/17/2017 at 0900   • glimepiride (AMARYL) 2 MG tablet Take 2 mg by mouth every morning before breakfast.   4/17/2017 at 0900   • glucose blood test strip Test glucose daily DX E11.9 100 each 12 4/17/2017 at Unknown time   • levothyroxine (SYNTHROID, LEVOTHROID) 100 MCG tablet TAKE 1 TABLET BY MOUTH DAILY. 90 tablet 3 4/17/2017 at 0900   • ONE TOUCH LANCETS misc Test glucose daily dx e11.9 100 each 12 4/17/2017 at 0900   • pantoprazole (PROTONIX) 40 MG EC tablet Take 40  mg by mouth daily.   4/16/2017 at 2100   • potassium chloride (K-DUR) 10 MEQ CR tablet Take 10 mEq by mouth daily.   4/16/2017 at 2100   • valsartan (DIOVAN) 40 MG tablet Take 1 tablet by mouth Daily. 90 tablet 1 4/17/2017 at 0900   • warfarin (COUMADIN) 2.5 MG tablet TAKE 5MG ON MONDAY AND FRIDAY TAKE 2.5MG ALL OTHER DAYS.  0 4/17/2017 at 2100   • cetirizine (zyrTEC) 5 MG tablet Take 1 tablet by mouth Daily. 90 tablet 3 Not Taking   • docusate sodium (COLACE) 100 MG capsule Take 100 mg by mouth 2 (two) times a day.   Unknown at Unknown time   • Saw Palmetto 450 MG capsule Take  by mouth.   Unknown at Unknown time       Current Facility-Administered Medications:   •  acetaminophen (TYLENOL) tablet 650 mg, 650 mg, Oral, Q4H PRN, 650 mg at 04/18/17 0805 **OR** acetaminophen (TYLENOL) suppository 650 mg, 650 mg, Rectal, Q4H PRN, Mandeep Asif MD  •  aluminum-magnesium hydroxide-simethicone (MAALOX/MYLANTA) suspension 30 mL, 30 mL, Oral, Q6H PRN, Mandeep Asif MD  •  bisacodyl (DULCOLAX) EC tablet 5 mg, 5 mg, Oral, Daily PRN, Mandeep Asif MD  •  bisacodyl (DULCOLAX) suppository 10 mg, 10 mg, Rectal, Daily PRN, Mandeep Asif MD  •  citalopram (CeleXA) tablet 10 mg, 10 mg, Oral, Daily, Primo Salguero MD, 10 mg at 04/18/17 1245  •  dextrose (D50W) solution 25 g, 25 g, Intravenous, Q15 Min PRN, Primo Salguero MD  •  dextrose (GLUTOSE) oral gel 15 g, 15 g, Oral, Q15 Min PRN, Primo Salguero MD  •  finasteride (PROSCAR) tablet 5 mg, 5 mg, Oral, Daily, Primo Salguero MD  •  glucagon (human recombinant) (GLUCAGEN DIAGNOSTIC) injection 1 mg, 1 mg, Subcutaneous, Q15 Min PRN, Primo Salguero MD  •  insulin aspart (novoLOG) injection 0-9 Units, 0-9 Units, Subcutaneous, 4x Daily With Meals & Nightly, Primo Salguero MD, 4 Units at 04/18/17 1209  •  ipratropium-albuterol (DUO-NEB) nebulizer solution 3 mL, 3 mL, Nebulization, Q6H PRN, Mandeep Asif MD  •  levothyroxine (SYNTHROID, LEVOTHROID) tablet 100  mcg, 100 mcg, Oral, Q AM, Primo Salguero MD, 100 mcg at 04/18/17 1245  •  ondansetron (ZOFRAN) tablet 4 mg, 4 mg, Oral, Q6H PRN **OR** ondansetron ODT (ZOFRAN-ODT) disintegrating tablet 4 mg, 4 mg, Oral, Q6H PRN **OR** ondansetron (ZOFRAN) injection 4 mg, 4 mg, Intravenous, Q6H PRN, Mandeep Asif MD  •  [START ON 4/19/2017] pantoprazole (PROTONIX) EC tablet 40 mg, 40 mg, Oral, Q AM, Primo Salguero MD  •  sodium chloride 0.9 % flush 1-10 mL, 1-10 mL, Intravenous, PRN, Mandeep Asif MD  •  sodium chloride 0.9 % flush 10 mL, 10 mL, Intravenous, PRN, Logan Cabral MD  •  sodium chloride 0.9 % infusion, 125 mL/hr, Intravenous, Continuous, Mandeep Asif MD, Last Rate: 125 mL/hr at 04/18/17 0806, 125 mL/hr at 04/18/17 0806    Objective     Results Review:  LABS:    Results from last 7 days  Lab Units 04/17/17 2011   WBC 10*3/mm3 8.97   HEMOGLOBIN g/dL 14.7   HEMATOCRIT % 44.1   PLATELETS 10*3/mm3 174         Results from last 7 days  Lab Units 04/17/17 2011   SODIUM mmol/L 133*   POTASSIUM mmol/L 5.4*   CHLORIDE mmol/L 94*   TOTAL CO2 mmol/L 26.8   BUN mg/dL 49*   CREATININE mg/dL 1.51*   CALCIUM mg/dL 8.9   BILIRUBIN mg/dL 0.4   ALK PHOS U/L 65   ALT (SGPT) U/L 21   AST (SGOT) U/L 30   GLUCOSE mg/dL 117*       Results from last 7 days  Lab Units 04/18/17  0546 04/17/17 2011   INR  1.19* 1.65*       DIAGNOSTICS:  CTH- small amount of bifrontal SAH- stable to improved on follow up CTH. Diffuse cerebral atrophy.    Results Review:   I reviewed the patient's new clinical results.  I personally viewed and interpreted the patient's CTH    Vital Signs   Temp:  [97.6 °F (36.4 °C)-98.1 °F (36.7 °C)] 97.9 °F (36.6 °C)  Heart Rate:  [73-99] 73  Resp:  [16-18] 18  BP: ()/(47-74) 97/64    Physical Exam:  Physical Exam   Constitutional: He is oriented to person, place, and time.   Thin, elderly   HENT:   Head: Normocephalic and atraumatic.   Eyes: EOM are normal. Pupils are equal, round, and reactive to  light.   Neck: Neck supple. Normal carotid pulses present. Carotid bruit is not present.   Well healed thyroid incision   Cardiovascular: Intact distal pulses.  A regularly irregular rhythm present.   Pulmonary/Chest: Effort normal.   Neurological: He is alert and oriented to person, place, and time. He has normal strength. He has a normal Finger-Nose-Finger Test and a normal Heel to Parker Test.   Skin: Skin is warm and dry.   Psychiatric: He has a normal mood and affect. His speech is normal and behavior is normal. Judgment and thought content normal.   Vitals reviewed.    Neurologic Exam     Mental Status   Oriented to person, place, and time.   Follows 3 step commands.   Attention: normal. Concentration: normal.   Speech: speech is normal   Level of consciousness: alert  Knowledge: good.   Normal comprehension.     Cranial Nerves     CN II   Visual fields full to confrontation.     CN III, IV, VI   Pupils are equal, round, and reactive to light.  Extraocular motions are normal.   CN III: no CN III palsy  CN VI: no CN VI palsy  Nystagmus: none     CN V   Facial sensation intact.     CN VII   Facial expression full, symmetric.     CN VIII   CN VIII normal.     CN IX, X   CN IX normal.   CN X normal.     CN XI   CN XI normal.     CN XII   CN XII normal.     Motor Exam   Muscle bulk: decreased (generalized)  Overall muscle tone: normal  Right arm pronator drift: absent  Left arm pronator drift: absent    Strength   Strength 5/5 throughout.     Sensory Exam   Light touch normal.     Gait, Coordination, and Reflexes     Gait  Gait: (not tested due to safety)    Coordination   Finger to nose coordination: normal  Heel to shin coordination: normal    Reflexes   Right Key: absent  Left Key: absent      Assessment/Plan     Active Problems:    Chronic anticoagulation    Traumatic subarachnoid hemorrhage with loss of consciousness of 30 minutes or less    Syncope and collapse      PLAN: tiny SAH- stable; received  Kcentra and Vit K for Coumadin. INR 1.19 today. He is neuro intact. He is noted to have low sodium, low BP, elevated creatnine. Discussed with Dr. Ward. Will ask nephrology to evaluate to optimize his fluid status and electrolytes. He is Lasix as well. Likely his syncope was due to multiple factors- medications, hypovolemia, and A-fib. No need for further imaging, but syncopal risk needs to be addressed prior to restarting Coumadin. PT eval. OK for TTF.    I discussed the patients findings and my recommendations with patient, family, nursing staff and Dr. Sylvia Falcon, APRN  04/18/17  1:59 PM

## 2017-04-18 NOTE — PLAN OF CARE
Problem: Fall Risk (Adult)  Goal: Identify Related Risk Factors and Signs and Symptoms  Outcome: Ongoing (interventions implemented as appropriate)  Goal: Absence of Falls  Outcome: Ongoing (interventions implemented as appropriate)    Administratively Closed by Health Information Management

## 2017-04-18 NOTE — PLAN OF CARE
Problem: Patient Care Overview (Adult)  Goal: Plan of Care Review  Outcome: Ongoing (interventions implemented as appropriate)    04/18/17 1728   Coping/Psychosocial Response Interventions   Plan Of Care Reviewed With patient   Outcome Evaluation   Outcome Summary/Follow up Plan Pt. had no neuro symptoms, cleared by neuro surgery however cardiology consulted to evaluate blood pressure issues. No further imaging needed, cardiology applied a 24 hour holter. Vital signs stable. WIll continue to monitor.   Patient Care Overview   Progress improving         Problem: Fall Risk (Adult)  Goal: Identify Related Risk Factors and Signs and Symptoms  Outcome: Outcome(s) achieved Date Met:  04/18/17 04/18/17 1728   Fall Risk   Fall Risk: Related Risk Factors history of falls;neuro disease/injury;environment unfamiliar   Fall Risk: Signs and Symptoms presence of risk factors       Goal: Absence of Falls  Outcome: Ongoing (interventions implemented as appropriate)    04/18/17 1728   Fall Risk (Adult)   Absence of Falls making progress toward outcome

## 2017-04-18 NOTE — CONSULTS
"  Referring Provider: Sarita MOSQUERA  Reason for Consultation: Acute kidney injury    Subjective     Chief complaint   Chief Complaint   Patient presents with   • Fall   • Syncope     Pt states \"I got to the doorway to go to the bathroom and that's all I remember.  I'm not sure if I hit my head or not.\"     • Back Pain     Pt states that he does have a hx of broken ribs; that he was experiencing pain in that area at this time   • Hypotension     Pt states that he has been \"having low blood pressure this week.  But I took my blood pressure pill anyway because my heart rate was up.\"  Pt did provide log of pressure       History of present illness:      87 years old white male with a past medical history of atrial fibrillation, hypertension who presented today after syncopal episode.  Patient felt dizzy and fell and hit his head.  Patient was hypotensive at time of admission with blood pressure of 85/47.  The patient does take Diovan and Lasix at home.  Patient had flulike symptoms last week and has not been eating well.  He was noted to have hyponatremia, mild hyperkalemia and acute kidney injury.  We will consulted to help in management of his acute kidney injury.  He was found to have a subarachnoid hemorrhage.    Past Medical History:   Diagnosis Date   • A-fib    • Bladder cancer    • Kidney stones    • Macular degeneration    • Thyroid goiter      Past Surgical History:   Procedure Laterality Date   • CATARACT EXTRACTION     • CATARACT EXTRACTION     • THYROIDECTOMY     • TOTAL THYROIDECTOMY       Family History   Problem Relation Age of Onset   • Heart disease Mother    • No Known Problems Father    • Prostate cancer Brother      Social History   Substance Use Topics   • Smoking status: Former Smoker     Types: Cigarettes   • Smokeless tobacco: Never Used   • Alcohol use No     Prescriptions Prior to Admission   Medication Sig Dispense Refill Last Dose   • allopurinol (ZYLOPRIM) 100 MG tablet TAKE 1 " TABLET BY MOUTH 2 (TWO) TIMES A DAY. 180 tablet 3 4/17/2017 at 0900   • B Complex Vitamins (VITAMIN B COMPLEX PO) Take  by mouth.   4/17/2017 at 0900   • carvedilol (COREG) 6.25 MG tablet Take 6.25 mg by mouth 2 (Two) Times a Day With Meals.   4/17/2017 at 0900   • citalopram (CeleXA) 10 MG tablet Take 1 tablet by mouth daily. TAKE 1 TABLET BY MOUTH DAILY 90 tablet 2 4/16/2017 at 2100   • finasteride (PROSCAR) 5 MG tablet Take 5 mg by mouth Daily.   4/17/2017 at 0900   • fluticasone (FLONASE) 50 MCG/ACT nasal spray INHALE 2 SPRAYS INTO EACH NOSTRIL DAILY 16 mL 0 4/17/2017 at 0900   • furosemide (LASIX) 40 MG tablet TAKE 1 TABLET BY MOUTH 2 (TWO) TIMES A DAY. 180 tablet 2 4/17/2017 at 0900   • glimepiride (AMARYL) 2 MG tablet Take 2 mg by mouth every morning before breakfast.   4/17/2017 at 0900   • glucose blood test strip Test glucose daily DX E11.9 100 each 12 4/17/2017 at Unknown time   • levothyroxine (SYNTHROID, LEVOTHROID) 100 MCG tablet TAKE 1 TABLET BY MOUTH DAILY. 90 tablet 3 4/17/2017 at 0900   • ONE TOUCH LANCETS misc Test glucose daily dx e11.9 100 each 12 4/17/2017 at 0900   • pantoprazole (PROTONIX) 40 MG EC tablet Take 40 mg by mouth daily.   4/16/2017 at 2100   • potassium chloride (K-DUR) 10 MEQ CR tablet Take 10 mEq by mouth daily.   4/16/2017 at 2100   • valsartan (DIOVAN) 40 MG tablet Take 1 tablet by mouth Daily. 90 tablet 1 4/17/2017 at 0900   • warfarin (COUMADIN) 2.5 MG tablet TAKE 5MG ON MONDAY AND FRIDAY TAKE 2.5MG ALL OTHER DAYS.  0 4/17/2017 at 2100   • cetirizine (zyrTEC) 5 MG tablet Take 1 tablet by mouth Daily. 90 tablet 3 Not Taking   • docusate sodium (COLACE) 100 MG capsule Take 100 mg by mouth 2 (two) times a day.   Unknown at Unknown time   • Saw Palmetto 450 MG capsule Take  by mouth.   Unknown at Unknown time     Allergies:  Digoxin and related and Keflex [cephalexin]    Review of Systems  Pertinent items are noted in HPI.    Objective     Vital Signs  Temp:  [97.6 °F (36.4  "°C)-98.2 °F (36.8 °C)] 98.2 °F (36.8 °C)  Heart Rate:  [62-99] 76  Resp:  [16-18] 18  BP: ()/(47-74) 100/68    Flowsheet Rows         First Filed Value    Admission Height  64\" (162.6 cm) Documented at 04/17/2017 1912    Admission Weight  153 lb (69.4 kg) Documented at 04/17/2017 1912           I/O this shift:  In: 1233.3 [I.V.:1233.3]  Out: 450 [Urine:450]  I/O last 3 completed shifts:  In: 1670 [P.O.:250; I.V.:920; IV Piggyback:500]  Out: 400 [Urine:400]    Intake/Output Summary (Last 24 hours) at 04/18/17 1706  Last data filed at 04/18/17 1607   Gross per 24 hour   Intake          2903.33 ml   Output              850 ml   Net          2053.33 ml       Physical Exam:     General Appearance:    Alert, cooperative, in no acute distress   Head:    Normocephalic, without obvious abnormality, atraumatic   Eyes:            Lids and lashes normal, conjunctivae and sclerae normal, no   icterus, no pallor, corneas clear, PERRLA   Ears:    Ears appear intact with no abnormalities noted   Throat:   No oral lesions, no thrush, oral mucosa moist   Neck:   No adenopathy, supple, trachea midline, no thyromegaly, no   carotid bruit, no JVD   Back:     No kyphosis present, no scoliosis present, no skin lesions,      erythema or scars, no tenderness to percussion or                   palpation,   range of motion normal   Lungs:     Clear to auscultation,respirations regular, even and                  unlabored    Heart:    Regular rhythm and normal rate, normal S1 and S2, no            murmur, no gallop, no rub, no click   Chest Wall:    No abnormalities observed   Abdomen:     Normal bowel sounds, no masses, no organomegaly, soft        non-tender, non-distended, no guarding, no rebound                tenderness   Rectal:     Deferred   Extremities:   Moves all extremities well, no edema, no cyanosis, no             redness   Pulses:   Pulses palpable and equal bilaterally   Skin:   No bleeding, bruising or rash   Lymph " nodes:   No palpable adenopathy   Neurologic:   Cranial nerves 2 - 12 grossly intact, sensation intact, DTR       present and equal bilaterally       Results Review:    Results from last 7 days  Lab Units 04/18/17  1318 04/17/17 2011   SODIUM mmol/L 137 133*   POTASSIUM mmol/L 4.2 5.4*   CHLORIDE mmol/L 101 94*   TOTAL CO2 mmol/L 22.7 26.8   BUN mg/dL 38* 49*   CREATININE mg/dL 1.14 1.51*   CALCIUM mg/dL 7.9* 8.9   BILIRUBIN mg/dL  --  0.4   ALK PHOS U/L  --  65   ALT (SGPT) U/L  --  21   AST (SGOT) U/L  --  30   GLUCOSE mg/dL 179* 117*       Estimated Creatinine Clearance: 41.5 mL/min (by C-G formula based on Cr of 1.14).      Results from last 7 days  Lab Units 04/18/17  1318   MAGNESIUM mg/dL 2.0         Results from last 7 days  Lab Units 04/17/17 2011   WBC 10*3/mm3 8.97   HEMOGLOBIN g/dL 14.7   PLATELETS 10*3/mm3 174         Results from last 7 days  Lab Units 04/18/17  0546 04/17/17 2011   INR  1.19* 1.65*       Active Medications    citalopram 10 mg Oral Daily   finasteride 5 mg Oral Daily   insulin aspart 0-9 Units Subcutaneous 4x Daily With Meals & Nightly   levothyroxine 100 mcg Oral Q AM   [START ON 4/19/2017] pantoprazole 40 mg Oral Q AM       sodium chloride 75 mL/hr Last Rate: 75 mL/hr (04/18/17 1607)       Assessment/Plan   Active Problems:    Chronic anticoagulation    Traumatic subarachnoid hemorrhage with loss of consciousness of 30 minutes or less    Syncope and collapse    1.  Acute kidney injury: Likely due to prerenal etiology.  We will cut down IV fluid to 75 cc per hour.Creatinine is down to 1.1 mg/dL.  We'll continue to avoid all nephrotoxic medications.  2.  Hyperkalemia: Down to 4.2.  Continue to monitor  3.  Hyponatremia: Improved up to 137.  Likely due to volume depletion  4.  Chronic A. Fib and syncopal episode: Further workup for syncopal episode per primary team.  5.  History of bladder cancer  6.  Subarachnoid hemorrhage        Deni Salmon MD  04/18/17  5:06  PM

## 2017-04-18 NOTE — PLAN OF CARE
Problem: Patient Care Overview (Adult)  Goal: Plan of Care Review  Outcome: Ongoing (interventions implemented as appropriate)    04/18/17 1545   Coping/Psychosocial Response Interventions   Plan Of Care Reviewed With patient   Outcome Evaluation   Outcome Summary/Follow up Plan Pt presented to PT with no c/o pain or dizziness. Pt able to sit EOB with min A and stand at EOB with min A, no reports of dizziness/light headedness with change in position. Pt able to stand and use toilet with SBA and ambulated around unit with CGA and rolling walker. Pt demonstrated decreased unsteadiness with walking when using walker. Pt will benefit from skilled PT in order to increase independence with functional mobility so he can D/C home with decreased risk for falls.         Problem: Inpatient Physical Therapy  Goal: Bed Mobility Goal LTG- PT  Outcome: Ongoing (interventions implemented as appropriate)    04/18/17 1545   Bed Mobility PT LTG   Bed Mobility PT LTG, Date Established 04/18/17   Bed Mobility PT LTG, Time to Achieve 1 wk   Bed Mobility PT LTG, Activity Type all bed mobility   Bed Mobility PT LTG, [place] Level supervision required       Goal: Transfer Training Goal 1 LTG- PT  Outcome: Ongoing (interventions implemented as appropriate)    04/18/17 1545   Transfer Training PT LTG   Transfer Training PT LTG, Date Established 04/18/17   Transfer Training PT LTG, Time to Achieve 1 wk   Transfer Training PT LTG, Activity Type all transfers   Transfer Training PT LTG, [place] Level supervision required       Goal: Gait Training Goal LTG- PT  Outcome: Ongoing (interventions implemented as appropriate)    04/18/17 1545   Gait Training PT LTG   Gait Training Goal PT LTG, Date Established 04/18/17   Gait Training Goal PT LTG, Time to Achieve 1 wk   Gait Training Goal PT LTG, [place] Level supervision required   Gait Training Goal PT LTG, Distance to Achieve 150   Gait Training Goal PT LTG, Additional Goal no  unsteadiness w/o AD

## 2017-04-18 NOTE — PROGRESS NOTES
Acute Care - Physical Therapy Initial Evaluation  [facility]     Patient Name: Haris Sweeney  : 1929  MRN: [id number]  Today's Date: 2017   Onset of Illness/Injury or Date of Surgery Date: (P) 17  Date of Referral to PT: (P) 17  Referring Physician: Asif (P)      Admit Date: 2017     Visit Dx:    ICD-10-CM ICD-9-CM   1. Syncope and collapse R55 780.2   2. Traumatic subarachnoid hemorrhage, with loss of consciousness of 30 minutes or less, initial encounter S06.6X1A 852.02   3. Renal insufficiency N28.9 593.9   4. Generalized weakness R53.1 780.79     Patient Active Problem List   Diagnosis   • Non-insulin dependent type 2 diabetes mellitus   • Generalized osteoarthritis of multiple sites   • Depression   • A-fib   • HTN (hypertension)   • Hypothyroidism   • Chronic gout   • GERD (gastroesophageal reflux disease)   • Encounter for prostate cancer screening   • Bilateral carpal tunnel syndrome   • Chronic anticoagulation   • Paresthesia of both hands   • Medicare annual wellness visit, subsequent   • Bilateral hand pain   • Traumatic subarachnoid hemorrhage with loss of consciousness of 30 minutes or less   • Syncope and collapse     Past Medical History:   Diagnosis Date   • A-fib    • Bladder cancer    • Kidney stones    • Macular degeneration    • Thyroid goiter      Past Surgical History:   Procedure Laterality Date   • CATARACT EXTRACTION     • CATARACT EXTRACTION     • THYROIDECTOMY     • TOTAL THYROIDECTOMY            PT ASSESSMENT (last 72 hours)      PT Evaluation       17 1538 17 1000    Rehab Evaluation    Document Type (P)  evaluation  -CH evaluation  -CP (r) LD (t) CP (c)    Subjective Information (P)  agree to therapy;complains of;fatigue  -CH     Patient Effort, Rehab Treatment (P)  good  -CH     Symptoms Noted During/After Treatment (P)  none  -CH     General Information    Patient Profile Review (P)  yes  -CH     Onset of Illness/Injury or Date of  Surgery Date (P)  04/17/17  -     Referring Physician (P)  Yefri  -     General Observations (P)  male pt, alert and oriented, supine in bed, no acute distress noted  -     Pertinent History Of Current Problem (P)  pt admitted to [facility] after experiencing syncope and falling and hitting head; pt on multiple medeications and experiencing a-fib  -     Precautions/Limitations (P)  fall precautions  -     Prior Level of Function (P)  independent:;all household mobility;community mobility  -     Equipment Currently Used at Home (P)  cane, straight  -     Plans/Goals Discussed With (P)  patient  -     Living Environment    Lives With (P)  spouse  -     Living Arrangements (P)  house  -     Home Accessibility (P)  no concerns  -     Clinical Impression    Date of Referral to PT (P)  04/18/17  -     PT Diagnosis (P)  generalized weakness  -     Patient/Family Goals Statement (P)  D/C home with wife  -     Criteria for Skilled Therapeutic Interventions Met (P)  yes;treatment indicated  -     Impairments Found (describe specific impairments) (P)  gait, locomotion, and balance  -     Rehab Potential (P)  good, to achieve stated therapy goals  -     Vital Signs    Pre Systolic BP Rehab (P)  114  -CH     Pre Treatment Diastolic BP (P)  69  -CH     Pretreatment Heart Rate (beats/min) (P)  79  -CH     Pre SpO2 (%) (P)  99  -     O2 Delivery Pre Treatment (P)  room air  -     Pain Assessment    Pain Assessment (P)  No/denies pain  -     Cognitive Assessment/Intervention    Current Cognitive/Communication Assessment (P)  functional  - impaired  -CP (r) LD (t) CP (c)    Orientation Status (P)  oriented x 4  -     Follows Commands/Answers Questions (P)  100% of the time;able to follow single-step instructions  -     Personal Safety (P)  WNL/WFL  -     Personal Safety Interventions (P)  fall prevention program maintained;gait belt;nonskid shoes/slippers when out of bed  -     ROM (Range  of Motion)    General ROM (P)  no range of motion deficits identified  -     MMT (Manual Muscle Testing)    General MMT Assessment (P)  no strength deficits identified   no focal deficits noted  -     Bed Mobility, Assessment/Treatment    Bed Mob, Supine to Sit, Casey (P)  minimum assist (75% patient effort)  -     Transfer Assessment/Treatment    Transfers, Sit-Stand Casey (P)  minimum assist (75% patient effort)  -     Transfers, Stand-Sit Casey (P)  minimum assist (75% patient effort)  -     Gait Assessment/Treatment    Gait, Casey Level (P)  contact guard assist  -     Gait, Assistive Device (P)  rolling walker  -     Gait, Distance (Feet) (P)  150  -     Gait, Comment (P)  increased unsteadiness when ambulating w/o walker  -     Positioning and Restraints    Pre-Treatment Position (P)  in bed  -     Post Treatment Position (P)  chair  -     In Chair (P)  sitting;call light within reach;encouraged to call for assist  -       04/17/17 6893       General Information    Equipment Currently Used at Home cane, straight  -MB     Living Environment    Lives With spouse  -MB     Living Arrangements house  -MB     Home Accessibility no concerns  -MB     Stair Railings at Home other (see comments)  -MB     Type of Financial/Environmental Concern none  -MB     Transportation Available car  -MB       User Key  (r) = Recorded By, (t) = Taken By, (c) = Cosigned By    Initials Name Provider Type    ELIEL Guerrier MS CCC-SLP Speech and Language Pathologist    AMANDA Hu, IRENE Registered Nurse    ANTONELLA Simeon, Speech Therapy Student Speech Therapy Student    KWABENA Ward, PT Student PT Student          Physical Therapy Education     Title: PT OT SLP Therapies (Active)     Topic: Physical Therapy (Active)     Point: Mobility training (Done)    Learning Progress Summary    Learner Readiness Method Response Comment Documented by Status   Patient Acceptance E VU    04/18/17 1545 Done                      User Key     Initials Effective Dates Name Provider Type Discipline     01/31/17 -  Yordan Ward, PT Student PT Student PT                PT Recommendation and Plan  Anticipated Discharge Disposition: (P) home with assist  Planned Therapy Interventions: (P) bed mobility training, gait training, strengthening, transfer training  PT Frequency: (P) daily  Plan of Care Review  Plan Of Care Reviewed With: (P) patient  Outcome Summary/Follow up Plan: (P) Pt presented to PT with no c/o pain or dizziness. Pt able to sit EOB with min A and stand at EOB with min A, no reports of dizziness/light headedness with change in position. Pt able to stand and use toilet with SBA and ambulated around unit with CGA and rolling walker. Pt demonstrated decreased unsteadiness with walking when using walker. Pt will benefit from skilled PT in order to increase independence with functional mobility so he can D/C home with decreased risk for falls.          IP PT Goals       04/18/17 1545          Bed Mobility PT LTG    Bed Mobility PT LTG, Date Established (P)  04/18/17  -      Bed Mobility PT LTG, Time to Achieve (P)  1 wk  -CH      Bed Mobility PT LTG, Activity Type (P)  all bed mobility  -CH      Bed Mobility PT LTG, Clarke Level (P)  supervision required  -      Transfer Training PT LTG    Transfer Training PT LTG, Date Established (P)  04/18/17  -      Transfer Training PT LTG, Time to Achieve (P)  1 wk  -      Transfer Training PT LTG, Activity Type (P)  all transfers  -CH      Transfer Training PT LTG, Clarke Level (P)  supervision required  -      Gait Training PT LTG    Gait Training Goal PT LTG, Date Established (P)  04/18/17  -      Gait Training Goal PT LTG, Time to Achieve (P)  1 wk  -CH      Gait Training Goal PT LTG, Clarke Level (P)  supervision required  -      Gait Training Goal PT LTG, Distance to Achieve (P)  150  -CH      Gait Training Goal PT  LTG, Additional Goal (P)  no unsteadiness w/o AD  -CH        User Key  (r) = Recorded By, (t) = Taken By, (c) = Cosigned By    Initials Name Provider Type    KWABENA Yordan Ward, PT Student PT Student                Outcome Measures       04/18/17 1500          How much help from another person do you currently need...    Turning from your back to your side while in flat bed without using bedrails? (P)  4  -CH      Moving from lying on back to sitting on the side of a flat bed without bedrails? (P)  3  -CH      Moving to and from a bed to a chair (including a wheelchair)? (P)  3  -CH      Standing up from a chair using your arms (e.g., wheelchair, bedside chair)? (P)  3  -CH      Climbing 3-5 steps with a railing? (P)  3  -CH      To walk in hospital room? (P)  3  -CH      AM-PAC 6 Clicks Score (P)  19  -CH      Functional Assessment    Outcome Measure Options (P)  AM-PAC 6 Clicks Basic Mobility (PT)  -CH        User Key  (r) = Recorded By, (t) = Taken By, (c) = Cosigned By    Initials Name Provider Type    KWABENA Yordan Ward, PT Student PT Student           Time Calculation:         PT Charges       04/18/17 1550          Time Calculation    Start Time (P)  1518  -CH      Stop Time (P)  1535  -CH      Time Calculation (min) (P)  17 min  -      PT Received On (P)  04/18/17  -      PT - Next Appointment (P)  04/19/17  -      PT Goal Re-Cert Due Date (P)  04/25/17  -        User Key  (r) = Recorded By, (t) = Taken By, (c) = Cosigned By    Initials Name Provider Type    KWABENA Yordan Ward, PT Student PT Student          Therapy Charges for Today     Code Description Service Date Service Provider Modifiers Qty    96020796732  PT EVAL LOW COMPLEXITY 2 4/18/2017 Yordan Ward, PT Student GP 1    [id number] HC PT THER PROC EA 15 MIN 4/18/2017 Yordan Ward PT Student GP 1          PT G-Codes  Outcome Measure Options: (P) AM-PAC 6 Clicks Basic Mobility (PT)      Yordan Ward PT Student  4/18/2017

## 2017-04-18 NOTE — PROGRESS NOTES
Acute Care - Speech Language Pathology   Swallow Initial Evaluation [facility]     Patient Name: Hairs Sweeney  : 1929  MRN: [id number]  Today's Date: 2017               Admit Date: 2017    SPEECH-LANGUAGE PATHOLOGY EVALUATION - SWALLOW  Subjective: The patient was seen on this date for a Clinical Swallow evaluation.  Patient was alert and cooperative.    The patient was admitted s/p fall and found to have tiny bilateral frontal ICH.  No reported history of dysphagia.  Objective: Textures given included thin liquid, puree consistency and regular consistency.  Assessment: Intermittent throat clear throughout eval and cough x1 following thin liquid.  Multiple swallows observed throughout eval, both with and without PO.  Pt reported that this occurs often at baseline due to his ill fitting upper dentures stimulating saliva production.  Adequate mastication of dry solid.  Laryngeal elevation WFL upon palpation.  Suspect that swallow is likely WFL, however feel VFSS is warranted due to intermittent throat clearing as aspiration is difficulty to differentiate.  Recommend cog eval as pt demonstrated some errors on informal screening tasks, although errors were self-corrected following cue.  SLP Findings:  Patient presents with suspected pharyngeal dysfunction without esophageal component.   Recommendations: Diet Textures: thin liquid, regular consistency food.  Medications should be taken whole with thin liquids.    Recommended Strategies: Upright for PO and small bites and sips. Oral care before breakfast, after all meals and PRN.  Other Recommended Evaluations: VFSS and Cognitive-Linguistic Evaluation    Dysphagia therapy is recommended. Rationale: If indicated per VFSS.  Visit Dx:     ICD-10-CM ICD-9-CM   1. Syncope and collapse R55 780.2   2. Traumatic subarachnoid hemorrhage, with loss of consciousness of 30 minutes or less, initial encounter S06.6X1A 852.02   3. Renal insufficiency N28.9 593.9      Patient Active Problem List   Diagnosis   • Non-insulin dependent type 2 diabetes mellitus   • Generalized osteoarthritis of multiple sites   • Depression   • A-fib   • HTN (hypertension)   • Hypothyroidism   • Chronic gout   • GERD (gastroesophageal reflux disease)   • Encounter for prostate cancer screening   • Bilateral carpal tunnel syndrome   • Chronic anticoagulation   • Paresthesia of both hands   • Medicare annual wellness visit, subsequent   • Bilateral hand pain   • ICH (intracerebral hemorrhage)   • Syncope and collapse     Past Medical History:   Diagnosis Date   • A-fib    • Bladder cancer    • Kidney stones    • Macular degeneration    • Thyroid goiter      Past Surgical History:   Procedure Laterality Date   • CATARACT EXTRACTION     • CATARACT EXTRACTION     • THYROIDECTOMY     • TOTAL THYROIDECTOMY            SWALLOW EVALUATION (last 72 hours)      Swallow Evaluation       04/18/17 1000                Rehab Evaluation    Document Type (P)  evaluation  -LD        General Information    Patient Profile Review (P)  yes  -LD        Current Diet Limitations (P)  NPO  -LD        Plans/Goals Discussed With (P)  patient  -LD        Barriers to Rehab (P)  none identified  -LD        Clinical Impression    Patient's Goals For Discharge (P)  patient did not state  -LD        SLP Swallowing Diagnosis (P)  oral dysfunction;pharyngeal dysfunction  -LD        Rehab Potential/Prognosis, Swallowing (P)  good, to achieve stated therapy goals  -LD        Criteria for Skilled Therapeutic Interventions Met (P)  skilled criteria for dysphagia intervention met  -LD        FCM, Swallowing (P)  7-->Level 7  -LD        Therapy Frequency (P)  PRN  -LD        Predicted Duration Therapy Interv (days) (P)  until discharge  -LD        Expected Duration Therapy Session (min) (P)  15-30 minutes  -LD        SLP Diet Recommendation (P)  regular textures;thin liquids  -LD        Recommended Diagnostics (P)  VFSS (MBS)  -LD         Recommended Feeding/Eating Techniques (P)  maintain upright posture during/after eating for 30 mins;small sips/bites  -LD        SLP Rec. for Method of Medication Administration (P)  meds whole with thin liquid;meds crushed in pudding/applesauce;meds whole in pudding/applesauce  -LD        Monitor For Signs Of Aspiration (P)  cough;gurgly voice;throat clearing  -LD        Anticipated Discharge Disposition (P)  other (see comments)   unknown  -LD        Cognitive Assessment/Intervention    Current Cognitive/Communication Assessment (P)  impaired  -LD        Oral Motor Structure and Function    Oral Motor Anatomy and Physiology (P)  patient demonstrates anatomy that is WNL  -LD        Dentition Assessment (P)  upper dentures/partial in place  -LD        Oral Musculature General Assessment (P)  WFL (within functional limits)  -LD        Oral Motor Assessment Comment (P)  WFL  -LD          User Key  (r) = Recorded By, (t) = Taken By, (c) = Cosigned By    Initials Name Effective Dates    LD Marily Simeon Speech Therapy Student 12/28/16 -         EDUCATION  The patient has been educated in the following areas:   Dysphagia (Swallowing Impairment).    SLP Recommendation and Plan  SLP Swallowing Diagnosis: (P) oral dysfunction, pharyngeal dysfunction  SLP Diet Recommendation: (P) regular textures, thin liquids  Recommended Feeding/Eating Techniques: (P) maintain upright posture during/after eating for 30 mins, small sips/bites  SLP Rec. for Method of Medication Administration: (P) meds whole with thin liquid, meds crushed in pudding/applesauce, meds whole in pudding/applesauce  Monitor For Signs Of Aspiration: (P) cough, gurgly voice, throat clearing  Recommended Diagnostics: (P) VFSS (MBS)  Criteria for Skilled Therapeutic Interventions Met: (P) skilled criteria for dysphagia intervention met  Anticipated Discharge Disposition: (P) other (see comments) (unknown)  Rehab Potential/Prognosis, Swallowing: (P) good, to achieve  stated therapy goals  Therapy Frequency: (P) PRN             Plan of Care Review  Plan Of Care Reviewed With: (P) patient          IP SLP Goals       04/18/17 1046          Safely Consume Diet    Safely Consume Diet- SLP, Date Established (P)  04/18/17  -LD      Safely Consume Diet- SLP, Time to Achieve (P)  by discharge  -LD        User Key  (r) = Recorded By, (t) = Taken By, (c) = Cosigned By    Initials Name Provider Type    ANTONELLA Simeon Speech Therapy Student Speech Therapy Student             SLP Outcome Measures (last 72 hours)      SLP Outcome Measures       04/18/17 1000          SLP Outcome Measures    Outcome Measure Used? (P)  Adult NOMS  -LD      FCM Scores    FCM Chosen (P)  Swallowing  -LD      Swallowing FCM Score (P)  7  -LD        User Key  (r) = Recorded By, (t) = Taken By, (c) = Cosigned By    Initials Name Effective Dates    ANTONELLA Simeon Speech Therapy Student 12/28/16 -            Time Calculation:         Time Calculation- SLP       04/18/17 1047          Time Calculation- SLP    SLP Start Time (P)  0800  -LD      SLP Stop Time (P)  0845  -LD      SLP Time Calculation (min) (P)  45 min  -LD        User Key  (r) = Recorded By, (t) = Taken By, (c) = Cosigned By    Initials Name Provider Type    ANTONELLA Simeon Speech Therapy Student Speech Therapy Student          Therapy Charges for Today     Code Description Service Date Service Provider Modifiers Qty    80963170972  ST EVAL ORAL PHARYNG SWALLOW 3 4/18/2017 Marily Simeon Speech Therapy Student GN 1               Marily Simeon Speech Therapy Student  4/18/2017

## 2017-04-18 NOTE — PROGRESS NOTES
"      [facility]         Dr Tillman Sayied   LOS: 1 day   Patient Care Team:  Srikanth Madsen MD as PCP - General (Internal Medicine)    Chief Complaint: Subdural hematoma in the setting off coagulopathy due to A. fib    Interval History: Feels good this morning.  Mild headaches.  Discussed with rounding team.  Currently in A. fib on the monitor.    REVIEW OF SYSTEMS:   CARDIOVASCULAR: No chest pain, chest pressure or chest discomfort. No palpitations or edema.   RESPIRATORY: No shortness of breath, cough or sputum.   GASTROINTESTINAL: No anorexia, nausea, vomiting or diarrhea. No abdominal pain or blood.   HEMATOLOGIC: No bleeding or bruising.     Ventilator/Non-Invasive Ventilation Settings     None            Vital Signs  Temp:  [97.6 °F (36.4 °C)-98.1 °F (36.7 °C)] 97.9 °F (36.6 °C)  Heart Rate:  [73-99] 73  Resp:  [16-18] 18  BP: ()/(47-74) 97/64    Intake/Output Summary (Last 24 hours) at 04/18/17 1155  Last data filed at 04/18/17 0806   Gross per 24 hour   Intake          2903.33 ml   Output              400 ml   Net          2503.33 ml     Flowsheet Rows         First Filed Value    Admission Height  64\" (162.6 cm) Documented at 04/17/2017 1912    Admission Weight  153 lb (69.4 kg) Documented at 04/17/2017 1912          Physical Exam:   General Appearance:    Alert, cooperative, in no acute distress   Lungs:     Clear to auscultation,respirations regular, even and                  unlabored    Heart:    irRegular rhythm and normal rate, normal S1 and S2, no            murmur, no gallop, no rub, no click   Chest Wall:    No abnormalities observed   Abdomen:     Normal bowel sounds, no masses, no organomegaly, soft        non-tender, non-distended, no guarding, no rebound                tenderness   Extremities:   Moves all extremities well, no edema, no cyanosis, no             redness     Results Review:          Results from last 7 days  Lab Units 04/17/17 2011   SODIUM mmol/L 133* "   POTASSIUM mmol/L 5.4*   CHLORIDE mmol/L 94*   TOTAL CO2 mmol/L 26.8   BUN mg/dL 49*   CREATININE mg/dL 1.51*   GLUCOSE mg/dL 117*   CALCIUM mg/dL 8.9       Results from last 7 days  Lab Units 04/17/17 2011   TROPONIN T ng/mL 0.035*       Results from last 7 days  Lab Units 04/17/17 2011   WBC 10*3/mm3 8.97   HEMOGLOBIN g/dL 14.7   HEMATOCRIT % 44.1   PLATELETS 10*3/mm3 174       Results from last 7 days  Lab Units 04/18/17  0546 04/17/17 2011   INR  1.19* 1.65*                       I reviewed the patient's new clinical results.  I personally viewed and interpreted the patient's CXR        Medication Review:     citalopram 10 mg Oral Daily   finasteride 5 mg Oral Daily   levothyroxine 100 mcg Oral Q AM         sodium chloride 125 mL/hr Last Rate: 125 mL/hr (04/18/17 0806)       ASSESSMENT:   Subdural hemorrhage  Acute kidney injury  Chronic A. fib  History of bladder cancer      PLAN:   neurosurgical consult pending  Continue IV fluids  Check BMP  Neuro status remained stable  Discussed with multidisciplinary team  Transfer out of the ICU once cleared by neurosurgery      Primo Salguero MD  04/18/17  11:55 AM

## 2017-04-19 ENCOUNTER — APPOINTMENT (OUTPATIENT)
Dept: GENERAL RADIOLOGY | Facility: HOSPITAL | Age: 82
End: 2017-04-19

## 2017-04-19 ENCOUNTER — APPOINTMENT (OUTPATIENT)
Dept: CARDIOLOGY | Facility: HOSPITAL | Age: 82
End: 2017-04-19

## 2017-04-19 LAB
ANION GAP SERPL CALCULATED.3IONS-SCNC: 11.3 MMOL/L
BASOPHILS # BLD AUTO: 0.01 10*3/MM3 (ref 0–0.2)
BASOPHILS NFR BLD AUTO: 0.2 % (ref 0–1.5)
BUN BLD-MCNC: 23 MG/DL (ref 8–23)
BUN/CREAT SERPL: 27.4 (ref 7–25)
CALCIUM SPEC-SCNC: 8.3 MG/DL (ref 8.6–10.5)
CHLORIDE SERPL-SCNC: 104 MMOL/L (ref 98–107)
CO2 SERPL-SCNC: 22.7 MMOL/L (ref 22–29)
CREAT BLD-MCNC: 0.84 MG/DL (ref 0.76–1.27)
DEPRECATED RDW RBC AUTO: 55.3 FL (ref 37–54)
EOSINOPHIL # BLD AUTO: 0.1 10*3/MM3 (ref 0–0.7)
EOSINOPHIL NFR BLD AUTO: 1.8 % (ref 0.3–6.2)
ERYTHROCYTE [DISTWIDTH] IN BLOOD BY AUTOMATED COUNT: 15.1 % (ref 11.5–14.5)
GFR SERPL CREATININE-BSD FRML MDRD: 86 ML/MIN/1.73
GLUCOSE BLD-MCNC: 138 MG/DL (ref 65–99)
GLUCOSE BLDC GLUCOMTR-MCNC: 102 MG/DL (ref 70–130)
GLUCOSE BLDC GLUCOMTR-MCNC: 114 MG/DL (ref 70–130)
GLUCOSE BLDC GLUCOMTR-MCNC: 79 MG/DL (ref 70–130)
HCT VFR BLD AUTO: 38 % (ref 40.4–52.2)
HGB BLD-MCNC: 12.7 G/DL (ref 13.7–17.6)
IMM GRANULOCYTES # BLD: 0.04 10*3/MM3 (ref 0–0.03)
IMM GRANULOCYTES NFR BLD: 0.7 % (ref 0–0.5)
LYMPHOCYTES # BLD AUTO: 1.45 10*3/MM3 (ref 0.9–4.8)
LYMPHOCYTES NFR BLD AUTO: 25.9 % (ref 19.6–45.3)
MCH RBC QN AUTO: 33.2 PG (ref 27–32.7)
MCHC RBC AUTO-ENTMCNC: 33.4 G/DL (ref 32.6–36.4)
MCV RBC AUTO: 99.5 FL (ref 79.8–96.2)
MONOCYTES # BLD AUTO: 0.79 10*3/MM3 (ref 0.2–1.2)
MONOCYTES NFR BLD AUTO: 14.1 % (ref 5–12)
NEUTROPHILS # BLD AUTO: 3.2 10*3/MM3 (ref 1.9–8.1)
NEUTROPHILS NFR BLD AUTO: 57.3 % (ref 42.7–76)
PLATELET # BLD AUTO: 160 10*3/MM3 (ref 140–500)
PMV BLD AUTO: 10.7 FL (ref 6–12)
POTASSIUM BLD-SCNC: 4.9 MMOL/L (ref 3.5–5.2)
RBC # BLD AUTO: 3.82 10*6/MM3 (ref 4.6–6)
SODIUM BLD-SCNC: 138 MMOL/L (ref 136–145)
WBC NRBC COR # BLD: 5.59 10*3/MM3 (ref 4.5–10.7)

## 2017-04-19 PROCEDURE — 99233 SBSQ HOSP IP/OBS HIGH 50: CPT | Performed by: INTERNAL MEDICINE

## 2017-04-19 PROCEDURE — 0399T HC MYOCARDL STRAIN IMAG QUAN ASSMT PER SESS: CPT

## 2017-04-19 PROCEDURE — 92611 MOTION FLUOROSCOPY/SWALLOW: CPT

## 2017-04-19 PROCEDURE — 85025 COMPLETE CBC W/AUTO DIFF WBC: CPT | Performed by: INTERNAL MEDICINE

## 2017-04-19 PROCEDURE — 25010000002 PERFLUTREN (DEFINITY) 8.476 MG IN SODIUM CHLORIDE 10 ML INJECTION: Performed by: INTERNAL MEDICINE

## 2017-04-19 PROCEDURE — 97110 THERAPEUTIC EXERCISES: CPT

## 2017-04-19 PROCEDURE — 93010 ELECTROCARDIOGRAM REPORT: CPT | Performed by: INTERNAL MEDICINE

## 2017-04-19 PROCEDURE — 93306 TTE W/DOPPLER COMPLETE: CPT | Performed by: INTERNAL MEDICINE

## 2017-04-19 PROCEDURE — 82962 GLUCOSE BLOOD TEST: CPT

## 2017-04-19 PROCEDURE — 80048 BASIC METABOLIC PNL TOTAL CA: CPT | Performed by: INTERNAL MEDICINE

## 2017-04-19 PROCEDURE — 74230 X-RAY XM SWLNG FUNCJ C+: CPT

## 2017-04-19 PROCEDURE — C8929 TTE W OR WO FOL WCON,DOPPLER: HCPCS

## 2017-04-19 PROCEDURE — 93005 ELECTROCARDIOGRAM TRACING: CPT | Performed by: NURSE PRACTITIONER

## 2017-04-19 PROCEDURE — 0399T ADULT TRANSTHORACIC ECHO COMPLETE WITH CONTRAST: CPT | Performed by: INTERNAL MEDICINE

## 2017-04-19 RX ADMIN — LEVOTHYROXINE SODIUM 100 MCG: 100 TABLET ORAL at 06:42

## 2017-04-19 RX ADMIN — CITALOPRAM 10 MG: 10 TABLET ORAL at 08:59

## 2017-04-19 RX ADMIN — PANTOPRAZOLE SODIUM 40 MG: 40 TABLET, DELAYED RELEASE ORAL at 06:42

## 2017-04-19 RX ADMIN — FINASTERIDE 5 MG: 5 TABLET, FILM COATED ORAL at 08:59

## 2017-04-19 RX ADMIN — PERFLUTREN 3 ML: 6.52 INJECTION, SUSPENSION INTRAVENOUS at 17:05

## 2017-04-19 NOTE — THERAPY DISCHARGE NOTE
Acute Care - Speech Language Pathology   Swallow Eval/Discharge [facility]     Patient Name: Haris Sweeney  : 1929  MRN: [id number]  Today's Date: 2017  Onset of Illness/Injury or Date of Surgery Date: 17            Admit Date: 2017  SPEECH-LANGUAGE PATHOLOGY EVALUTION - VFSS  Subjective: The patient was seen on this date for a VFSS(Videofluoroscopic Swallowing Study).  Patient was alert and cooperative.    Objective: Risks/benefits were reviewed with the patient, and consent was obtained. The study was completed with SLP present and Radiologist review. The patient was seen in lateral view(s). Textures given included thin liquid, nectar thick liquid, puree consistency, mechanical soft consistency and regular consistency.  Assessment: Difficulties were noted with none of the above consistencies . Esophageal scan WFL.  SLP Findings: Pt presents with functional swallow. No penetration or aspiration observed with thin liquids, nectar thick, puree, mech soft, and solid. Liquid wash aids in clearance of residue of dry solid.  Recommendations: Diet Textures: thin liquid, regular consistency food. Medications should be taken whole with thin liquids.   Recommended Strategies: Upright for PO and small bites and sips. Oral care before breakfast, after all meals and PRN.  Other Recommended Evaluations:     Dysphagia therapy is not recommended. Rationale: Not warranted at this time.      Visit Dx:    ICD-10-CM ICD-9-CM   1. Syncope and collapse R55 780.2   2. Traumatic subarachnoid hemorrhage, with loss of consciousness of 30 minutes or less, initial encounter S06.6X1A 852.02   3. Renal insufficiency N28.9 593.9   4. Generalized weakness R53.1 780.79     Patient Active Problem List   Diagnosis   • Non-insulin dependent type 2 diabetes mellitus   • Generalized osteoarthritis of multiple sites   • Depression   • A-fib   • HTN (hypertension)   • Hypothyroidism   • Chronic gout   • GERD (gastroesophageal  reflux disease)   • Encounter for prostate cancer screening   • Bilateral carpal tunnel syndrome   • Chronic anticoagulation   • Paresthesia of both hands   • Medicare annual wellness visit, subsequent   • Bilateral hand pain   • Traumatic subarachnoid hemorrhage with loss of consciousness of 30 minutes or less   • Syncope and collapse     Past Medical History:   Diagnosis Date   • A-fib    • Bladder cancer    • Kidney stones    • Macular degeneration    • Thyroid goiter      Past Surgical History:   Procedure Laterality Date   • CATARACT EXTRACTION     • CATARACT EXTRACTION     • THYROIDECTOMY     • TOTAL THYROIDECTOMY            SWALLOW EVALUATION (last 72 hours)      Swallow Evaluation       04/19/17 1453 04/18/17 1000             Rehab Evaluation    Document Type re-evaluation  -OC evaluation  -CP (r) LD (t) CP (c)       Subjective Information no complaints;agree to therapy  -OC        Patient Effort, Rehab Treatment good  -OC        Symptoms Noted During/After Treatment none  -OC        General Information    Patient Profile Review yes  -OC yes  -CP (r) LD (t) CP (c)       Current Diet Limitations thin liquids;regular solid  -OC NPO  -CP (r) LD (t) CP (c)       Plans/Goals Discussed With patient;agreed upon  -OC patient  -CP (r) LD (t) CP (c)       Barriers to Rehab none identified  -OC none identified  -CP (r) LD (t) CP (c)       Clinical Impression    Patient's Goals For Discharge  patient did not state  -CP (r) LD (t) CP (c)       SLP Swallowing Diagnosis other (see comments)   functional swallow  -OC oral dysfunction;pharyngeal dysfunction  -CP (r) LD (t) CP (c)       Rehab Potential/Prognosis, Swallowing good, to achieve stated therapy goals  -OC good, to achieve stated therapy goals  -CP (r) LD (t) CP (c)       Criteria for Skilled Therapeutic Interventions Met skilled criteria for dysphagia intervention met  -OC skilled criteria for dysphagia intervention met  -CP (r) LD (t) CP (c)       FCM, Swallowing  7-->Level 7  -OC 7-->Level 7  -CP (r) LD (t) CP (c)       Therapy Frequency evaluation only  -OC PRN  -CP (r) LD (t) CP (c)       Predicted Duration Therapy Interv (days)  until discharge  -CP (r) LD (t) CP (c)       Expected Duration Therapy Session (min)  15-30 minutes  -CP (r) LD (t) CP (c)       SLP Diet Recommendation regular textures;thin liquids  -OC regular textures;thin liquids  -CP (r) LD (t) CP (c)       Recommended Diagnostics  VFSS (MBS)  -CP (r) LD (t) CP (c)       Recommended Feeding/Eating Techniques maintain upright posture during/after eating for 30 mins;small sips/bites  -OC maintain upright posture during/after eating for 30 mins;small sips/bites  -CP (r) LD (t) CP (c)       SLP Rec. for Method of Medication Administration meds whole with thin liquid  -OC meds whole with thin liquid;meds crushed in pudding/applesauce;meds whole in pudding/applesauce  -CP (r) LD (t) CP (c)       Monitor For Signs Of Aspiration cough;elevated WBC count;gurgly voice;throat clearing;right lower lobe infiltrates  -OC cough;gurgly voice;throat clearing  -CP (r) LD (t) CP (c)       Anticipated Discharge Disposition other (see comments)   unknown  -OC other (see comments)   unknown  -CP (r) LD (t) CP (c)       Pain Assessment    Pain Assessment No/denies pain  -OC        Cognitive Assessment/Intervention    Current Cognitive/Communication Assessment functional  -OC impaired  -CP (r) LD (t) CP (c)       Oral Motor Structure and Function    Oral Motor Anatomy and Physiology  patient demonstrates anatomy that is WNL  -CP (r) LD (t) CP (c)       Dentition Assessment  upper dentures/partial in place  -CP (r) LD (t) CP (c)       Oral Musculature General Assessment  WFL (within functional limits)  -CP (r) LD (t) CP (c)       Oral Motor Assessment Comment  WFL  -CP (r) LD (t) CP (c)       SLP Communication to Radiology    Severity Level of Dysphagia other (see comments)   Functional swallow  -OC        Summary Statement Pt  presents with functional swallow. No penetration or aspiration observed with thin liquids, nectar thick, puree, mech soft, and solid. Liquid wash aids in clearance of residue of dry solid.  -OC          User Key  (r) = Recorded By, (t) = Taken By, (c) = Cosigned By    Initials Name Effective Dates    OC Misty Germain MA,CCC-SLP 04/05/17 -     CP Judith Guerrier, MS CCC-SLP 04/13/15 -     LD Marily Simeon, Speech Therapy Student 12/28/16 -         EDUCATION  The patient has been educated in the following areas:   Dysphagia (Swallowing Impairment).    SLP Recommendation and Plan  SLP Swallowing Diagnosis: other (see comments) (functional swallow)  SLP Diet Recommendation: regular textures, thin liquids  Recommended Feeding/Eating Techniques: maintain upright posture during/after eating for 30 mins, small sips/bites  SLP Rec. for Method of Medication Administration: meds whole with thin liquid  Monitor For Signs Of Aspiration: cough, elevated WBC count, gurgly voice, throat clearing, right lower lobe infiltrates     Criteria for Skilled Therapeutic Interventions Met: skilled criteria for dysphagia intervention met  Anticipated Discharge Disposition: other (see comments) (unknown)  Rehab Potential/Prognosis, Swallowing: good, to achieve stated therapy goals  Therapy Frequency: evaluation only             Plan of Care Review  Plan Of Care Reviewed With: patient  Progress: improving  Outcome Summary/Follow up Plan: VFSS complete. Recommend regular textures with thin liquids.          IP SLP Goals       04/18/17 1046          Safely Consume Diet    Safely Consume Diet- SLP, Date Established 04/18/17  -CP (r) LD (t) CP (c)      Safely Consume Diet- SLP, Time to Achieve by discharge  -CP (r) LD (t) CP (c)        User Key  (r) = Recorded By, (t) = Taken By, (c) = Cosigned By    Initials Name Provider Type    CP Judith Guerrier, MS CCC-SLP Speech and Language Pathologist    ANTONELLA Simeon, Speech Therapy Student Speech Therapy  Student             SLP Outcome Measures (last 72 hours)      SLP Outcome Measures       04/19/17 1456 04/18/17 1000       SLP Outcome Measures    Outcome Measure Used? Adult NOMS  -OC Adult NOMS  -CP (r) LD (t) CP (c)     FCM Scores    FCM Chosen Swallowing  -OC Swallowing  -CP (r) LD (t) CP (c)     Swallowing FCM Score 7  -OC 7  -CP (r) LD (t) CP (c)       User Key  (r) = Recorded By, (t) = Taken By, (c) = Cosigned By    Initials Name Effective Dates    OC Misty Germain MA,CCC-SLP 04/05/17 -     CP Judith Guerrier, MS CCC-SLP 04/13/15 -     LD Marily Simeon, Speech Therapy Student 12/28/16 -            Time Calculation:         Time Calculation- SLP       04/19/17 1457          Time Calculation- SLP    SLP Start Time 1400  -OC      SLP Stop Time 1500  -OC      SLP Time Calculation (min) 60 min  -OC      SLP Received On 04/19/17  -OC        User Key  (r) = Recorded By, (t) = Taken By, (c) = Cosigned By    Initials Name Provider Type    OC Misty Germain MA,CCC-SLP Speech and Language Pathologist          Therapy Charges for Today     Code Description Service Date Service Provider Modifiers Qty    53090109171 HC ST MOTION FLUORO EVAL SWALLOW 4 4/19/2017 Misty Germain MA,CCC-SLP GN 1               SLP Discharge Summary  Anticipated Discharge Disposition: other (see comments) (unknown)  Reason for Discharge: All goals achieved  Outcomes Achieved: Able to achieve all goals within established timeline  Discharge Destination: other (comment) (unknown)    Misty Germain MA,CCC-SLP  4/19/2017

## 2017-04-19 NOTE — PROGRESS NOTES
"[facility] PULMONARY CARE         Dr Tillman Sayied   LOS: 2 days   Patient Care Team:  Srikanth Madsen MD as PCP - General (Internal Medicine)    Chief Complaint: Subdural hematoma in the setting off coagulopathy due to A. fib    Interval History: Feels good this morning.  Sitting up in the chair.  No acute issues reported.    REVIEW OF SYSTEMS:   CARDIOVASCULAR: No chest pain, chest pressure or chest discomfort. No palpitations or edema.   RESPIRATORY: No shortness of breath, cough or sputum.   GASTROINTESTINAL: No anorexia, nausea, vomiting or diarrhea. No abdominal pain or blood.   HEMATOLOGIC: No bleeding or bruising.     Ventilator/Non-Invasive Ventilation Settings     None            Vital Signs  Temp:  [97.5 °F (36.4 °C)-98.3 °F (36.8 °C)] 97.9 °F (36.6 °C)  Heart Rate:  [65-91] 82  Resp:  [15-17] 15  BP: ()/() 112/85    Intake/Output Summary (Last 24 hours) at 04/19/17 1134  Last data filed at 04/19/17 0900   Gross per 24 hour   Intake                0 ml   Output             1000 ml   Net            -1000 ml     Flowsheet Rows         First Filed Value    Admission Height  64\" (162.6 cm) Documented at 04/17/2017 1912    Admission Weight  153 lb (69.4 kg) Documented at 04/17/2017 1912          Physical Exam:   General Appearance:    Alert, cooperative, in no acute distress   Lungs:     Clear to auscultation,respirations regular, even and                  unlabored    Heart:    irRegular rhythm and normal rate, normal S1 and S2, no            murmur, no gallop, no rub, no click   Chest Wall:    No abnormalities observed   Abdomen:     Normal bowel sounds, no masses, no organomegaly, soft        non-tender, non-distended, no guarding, no rebound                tenderness   Extremities:   Moves all extremities well, no edema, no cyanosis, no             redness     Results Review:          Results from last 7 days  Lab Units 04/18/17  1318 04/17/17 2011   SODIUM mmol/L 137 133*   POTASSIUM " mmol/L 4.2 5.4*   CHLORIDE mmol/L 101 94*   TOTAL CO2 mmol/L 22.7 26.8   BUN mg/dL 38* 49*   CREATININE mg/dL 1.14 1.51*   GLUCOSE mg/dL 179* 117*   CALCIUM mg/dL 7.9* 8.9       Results from last 7 days  Lab Units 04/18/17  1318 04/17/17 2011   CK TOTAL U/L 93  --    TROPONIN T ng/mL 0.018 0.035*       Results from last 7 days  Lab Units 04/19/17  1027 04/17/17 2011   WBC 10*3/mm3 5.59 8.97   HEMOGLOBIN g/dL 12.7* 14.7   HEMATOCRIT % 38.0* 44.1   PLATELETS 10*3/mm3 160 174       Results from last 7 days  Lab Units 04/18/17  0546 04/17/17 2011   INR  1.19* 1.65*           Results from last 7 days  Lab Units 04/18/17  1318   MAGNESIUM mg/dL 2.0               I reviewed the patient's new clinical results.  I personally viewed and interpreted the patient's CXR        Medication Review:     citalopram 10 mg Oral Daily   finasteride 5 mg Oral Daily   insulin aspart 0-9 Units Subcutaneous 4x Daily With Meals & Nightly   levothyroxine 100 mcg Oral Q AM   pantoprazole 40 mg Oral Q AM         sodium chloride 75 mL/hr Last Rate: 75 mL/hr (04/18/17 1607)       ASSESSMENT:   Subdural hemorrhage  Acute kidney injury  Chronic A. fib  History of bladder cancer      PLAN:  Neurosurgical evaluation noted.  Renal managing fluids  Cardiac evaluation ongoing  Neuro status remained stable  Discussed with multidisciplinary team  Transfer out of the ICU       Primo Salguero MD  04/19/17  11:34 AM

## 2017-04-19 NOTE — PROGRESS NOTES
NEPHROLOGY PROGRESS NOTE    PATIENT IDENTIFICATION:   Name:  Haris Sweeney      MRN:  [id number]     87 y.o.  male             Reason for visit: DORA    SUBJECTIVE:   Seen and examined.  Still in ICU.  No complaint.  No chest pain or shortness of air.  OBJECTIVE:  Vitals:    04/19/17 0805 04/19/17 0905 04/19/17 1005 04/19/17 1100   BP: 109/69 129/64 112/85    BP Location:       Patient Position:       Pulse: 70 88 82    Resp:       Temp:    97.9 °F (36.6 °C)   TempSrc:       SpO2: 96% 98% 98%    Weight:       Height:               Body mass index is 27.81 kg/(m^2).    Intake/Output Summary (Last 24 hours) at 04/19/17 1137  Last data filed at 04/19/17 0900   Gross per 24 hour   Intake                0 ml   Output             1000 ml   Net            -1000 ml         Exam:  GEN:  No distress, appears stated age  EYES:   Anicteric sclera  ENT:    External ears/nose normal, MM are   NECK:  No adenopathy, JVP   LUNGS: Normal chest on inspection; not labored  CV:  Normal S1S2, without murmur  ABD:  Non-tender, non-distended, no hepatosplenomegaly, +BS  EXT:  No edema; no cyanosis; clubbing    Scheduled meds:    citalopram 10 mg Oral Daily   finasteride 5 mg Oral Daily   insulin aspart 0-9 Units Subcutaneous 4x Daily With Meals & Nightly   levothyroxine 100 mcg Oral Q AM   pantoprazole 40 mg Oral Q AM     IV meds:                        sodium chloride 75 mL/hr Last Rate: 75 mL/hr (04/18/17 1607)       Data Review:      Results from last 7 days  Lab Units 04/18/17  1318 04/17/17 2011   SODIUM mmol/L 137 133*   POTASSIUM mmol/L 4.2 5.4*   CHLORIDE mmol/L 101 94*   TOTAL CO2 mmol/L 22.7 26.8   BUN mg/dL 38* 49*   CREATININE mg/dL 1.14 1.51*   CALCIUM mg/dL 7.9* 8.9   BILIRUBIN mg/dL  --  0.4   ALK PHOS U/L  --  65   ALT (SGPT) U/L  --  21   AST (SGOT) U/L  --  30   GLUCOSE mg/dL 179* 117*       Estimated Creatinine Clearance: 41.9 mL/min (by C-G formula based on Cr of 1.14).      Results from last 7 days  Lab Units  04/18/17  1318   MAGNESIUM mg/dL 2.0         Results from last 7 days  Lab Units 04/19/17  1027 04/17/17 2011   WBC 10*3/mm3 5.59 8.97   HEMOGLOBIN g/dL 12.7* 14.7   PLATELETS 10*3/mm3 160 174         Results from last 7 days  Lab Units 04/18/17  0546 04/17/17 2011   INR  1.19* 1.65*             ASSESSMENT:   Active Problems:    Chronic anticoagulation    Traumatic subarachnoid hemorrhage with loss of consciousness of 30 minutes or less    Syncope and collapse      1.  Acute kidney injury: Likely due to prerenal etiology.  2.  Hyperkalemia: Improved  3. Hyponatremia: Improved . Likely due to volume depletion  4.  Chronic A. Fib and syncopal episode: Further workup for syncopal episode per primary team.  5.  History of bladder cancer  6.  Subarachnoid hemorrhage    Plan:  Awaiting labs for today  Volume status is okay  OK to be transferred out of ICU  Continue to avoid nephrotoxic medications  Surveillance labs    Deni Salmon MD  4/19/2017    11:37 AM

## 2017-04-19 NOTE — PROGRESS NOTES
Acute Care - Physical Therapy Treatment Note  [facility]     Patient Name: Haris Sweeney  : 1929  MRN: [id number]  Today's Date: 2017  Onset of Illness/Injury or Date of Surgery Date: 17  Date of Referral to PT: 17  Referring Physician: Yefri    Admit Date: 2017    Visit Dx:    ICD-10-CM ICD-9-CM   1. Syncope and collapse R55 780.2   2. Traumatic subarachnoid hemorrhage, with loss of consciousness of 30 minutes or less, initial encounter S06.6X1A 852.02   3. Renal insufficiency N28.9 593.9   4. Generalized weakness R53.1 780.79     Patient Active Problem List   Diagnosis   • Non-insulin dependent type 2 diabetes mellitus   • Generalized osteoarthritis of multiple sites   • Depression   • A-fib   • HTN (hypertension)   • Hypothyroidism   • Chronic gout   • GERD (gastroesophageal reflux disease)   • Encounter for prostate cancer screening   • Bilateral carpal tunnel syndrome   • Chronic anticoagulation   • Paresthesia of both hands   • Medicare annual wellness visit, subsequent   • Bilateral hand pain   • Traumatic subarachnoid hemorrhage with loss of consciousness of 30 minutes or less   • Syncope and collapse               Adult Rehabilitation Note       17 1558          Rehab Assessment/Intervention    Discipline (P)  physical therapist  -CH      Document Type (P)  therapy note (daily note)  -CH      Subjective Information (P)  agree to therapy;no complaints  -CH      Patient Effort, Rehab Treatment (P)  good  -CH      Symptoms Noted During/After Treatment (P)  none  -CH      Precautions/Limitations (P)  fall precautions  -CH      Recorded by [CH] Yordan Ward PT Student      Vital Signs    Posttreatment Heart Rate (beats/min) (P)  94  -CH      Post SpO2 (%) (P)  99  -CH      O2 Delivery Post Treatment (P)  room air  -CH      Recorded by [CH] Yordan Ward PT Student      Pain Assessment    Pain Assessment (P)  No/denies pain  -CH      Recorded by [CH] Yordan Ward,  PT Student      Cognitive Assessment/Intervention    Current Cognitive/Communication Assessment (P)  functional  -      Orientation Status (P)  oriented x 4  -      Follows Commands/Answers Questions (P)  100% of the time;able to follow single-step instructions  -      Personal Safety (P)  WNL/WFL  -      Personal Safety Interventions (P)  fall prevention program maintained;gait belt;nonskid shoes/slippers when out of bed  -      Recorded by [CH] Yordan Ward PT Student      Bed Mobility, Assessment/Treatment    Bed Mob, Supine to Sit, [place] (P)  not tested  -      Bed Mob, Sit to Supine, [place] (P)  not tested  -      Bed Mobility, Comment (P)  up in chair  -      Recorded by [] Yordan Ward PT [place]      Transfer Assessment/Treatment    Transfers, Sit-Stand [place] (P)  supervision required  -      Transfers, Stand-Sit [place] (P)  supervision required  -      Recorded by [] Yordan Ward, PT Student      Gait Assessment/Treatment    Gait, [place] Level (P)  stand by assist  -      Gait, Assistive Device (P)  rolling walker  -      Gait, Distance (Feet) (P)  552  -      Gait, Comment (P)  2 laps with rolling walker and socks, seated rest break, 1 lap with shoes and no AD  -      Recorded by [] Yordan Ward PT [place]      Positioning and Restraints    Pre-Treatment Position (P)  sitting in chair/recliner  -      Post Treatment Position (P)  chair  -CH      In Chair (P)  reclined;call light within reach;encouraged to call for assist;with family/caregiver  -      Recorded by [] Yordan Ward PT [place]        User Key  (r) = Recorded By, (t) = Taken By, (c) = Cosigned By    Initials Name Effective Dates     Yordan Ward, PT Student 01/31/17 -                 IP PT Goals       04/18/17 6734          Bed Mobility PT LTG    Bed Mobility PT LTG, Date Established 04/18/17  -PC (r) CH (t) PC (c)      Bed Mobility PT LTG, Time to Achieve  1 wk  -PC (r) CH (t) PC (c)      Bed Mobility PT LTG, Activity Type all bed mobility  -PC (r) CH (t) PC (c)      Bed Mobility PT LTG, [place] Level supervision required  -PC (r) CH (t) PC (c)      Transfer Training PT LTG    Transfer Training PT LTG, Date Established 04/18/17  -PC (r) CH (t) PC (c)      Transfer Training PT LTG, Time to Achieve 1 wk  -PC (r) CH (t) PC (c)      Transfer Training PT LTG, Activity Type all transfers  -PC (r) CH (t) PC (c)      Transfer Training PT LTG, [place] Level supervision required  -PC (r) CH (t) PC (c)      Gait Training PT LTG    Gait Training Goal PT LTG, Date Established 04/18/17  -PC (r) CH (t) PC (c)      Gait Training Goal PT LTG, Time to Achieve 1 wk  -PC (r) CH (t) PC (c)      Gait Training Goal PT LTG, [place] Level supervision required  -PC (r) CH (t) PC (c)      Gait Training Goal PT LTG, Distance to Achieve 150  -PC (r) CH (t) PC (c)      Gait Training Goal PT LTG, Additional Goal no unsteadiness w/o AD  -PC (r) CH (t) PC (c)        User Key  (r) = Recorded By, (t) = Taken By, (c) = Cosigned By    Initials Name Provider Type    PC Krystal Barber, PT Physical Therapist     Yordan Ward, PT Student PT Student          Physical Therapy Education     Title: PT OT SLP Therapies (Active)     Topic: Physical Therapy (Active)     Point: Mobility training (Done)    Learning Progress Summary    Learner Readiness Method Response Comment Documented by Status   Patient Acceptance E FRANCISJAN   04/19/17 1603 Done    Acceptance E VU   04/18/17 1545 Done   Family Acceptance E JAN BLANCO   04/19/17 1603 Done                      User Key     Initials Effective Dates Name Provider Type [facility] 01/31/17 -  Yordan Ward, PT Student PT Student PT                    PT Recommendation and Plan  Anticipated Discharge Disposition: home with assist  Planned Therapy Interventions: bed mobility training, gait training, strengthening, transfer training  PT Frequency:  daily  Plan of Care Review  Plan Of Care Reviewed With: (P) patient  Progress: (P) improving  Outcome Summary/Follow up Plan: (P) Pt presented to PT with no reports of pain or dizziness. Pt able to perform all functional mobility with supervision/SBA. Pt ambulated 2x around unit with rolling walker wearing nonskid socks, took a seated rest break, and ambulate 1 more lap around in shoes and w/o AD. Pt displays slightly more unsteady gait when ambulating w/o rolling walker. Pt will benefit from skilled PT to improve ambulation w/o AD so he can return home safely with decreased risk for falls.          Outcome Measures       04/19/17 1600 04/18/17 1500       How much help from another person do you currently need...    Turning from your back to your side while in flat bed without using bedrails? (P)  4  -CH 4  -PC (r) CH (t) PC (c)     Moving from lying on back to sitting on the side of a flat bed without bedrails? (P)  3  -CH 3  -PC (r) CH (t) PC (c)     Moving to and from a bed to a chair (including a wheelchair)? (P)  4  -CH 3  -PC (r) CH (t) PC (c)     Standing up from a chair using your arms (e.g., wheelchair, bedside chair)? (P)  4  -CH 3  -PC (r) CH (t) PC (c)     Climbing 3-5 steps with a railing? (P)  3  -CH 3  -PC (r) CH (t) PC (c)     To walk in hospital room? (P)  3  -CH 3  -PC (r) CH (t) PC (c)     AM-PAC 6 Clicks Score (P)  21  -CH 19  -PC (r) CH (t)     Functional Assessment    Outcome Measure Options (P)  AM-PAC 6 Clicks Basic Mobility (PT)  -CH AM-PAC 6 Clicks Basic Mobility (PT)  -PC (r) CH (t) PC (c)       User Key  (r) = Recorded By, (t) = Taken By, (c) = Cosigned By    Initials Name Provider Type    AVERY Barber, PT Physical Therapist    CH Yordan Ward, PT Student PT Student           Time Calculation:         PT Charges       04/19/17 1605          Time Calculation    Start Time (P)  1524  -CH      Stop Time (P)  1542  -CH      Time Calculation (min) (P)  18 min  -CH      PT Received On  (P)  04/19/17  -      PT - Next Appointment (P)  04/20/17  -      PT Goal Re-Cert Due Date (P)  04/25/17  -        User Key  (r) = Recorded By, (t) = Taken By, (c) = Cosigned By    Initials Name Provider Type    CH Yordan Ward, PT Student PT Student          Therapy Charges for Today     Code Description Service Date Service Provider Modifiers Qty    66054888484 HC PT EVAL LOW COMPLEXITY 2 4/18/2017 Yordan Ward, PT Student GP 1    01687063057 HC PT THER PROC EA 15 MIN 4/18/2017 Yordan Ward, PT Student GP 1    67105039997 HC PT THER PROC EA 15 MIN 4/19/2017 Yordan Ward, PT Student GP 1          PT G-Codes  Outcome Measure Options: (P) AM-PAC 6 Clicks Basic Mobility (PT)    Yordan Ward, PT Student  4/19/2017

## 2017-04-19 NOTE — PLAN OF CARE
Problem: Patient Care Overview (Adult)  Goal: Plan of Care Review  Outcome: Ongoing (interventions implemented as appropriate)    04/19/17 0393   Coping/Psychosocial Response Interventions   Plan Of Care Reviewed With patient   Outcome Evaluation   Outcome Summary/Follow up Plan Pt presented to PT with no reports of pain or dizziness. Pt able to perform all functional mobility with supervision/SBA. Pt ambulated 2x around unit with rolling walker wearing nonskid socks, took a seated rest break, and ambulate 1 more lap around in shoes and w/o AD. Pt displays slightly more unsteady gait when ambulating w/o rolling walker. Pt will benefit from skilled PT to improve ambulation w/o AD so he can return home safely with decreased risk for falls.   Patient Care Overview   Progress improving

## 2017-04-19 NOTE — PROGRESS NOTES
"Discharge Planning Assessment  [facility]     Patient Name: Haris Sweeney  MRN: [id number]  Today's Date: 4/19/2017    Admit Date: 4/17/2017          Discharge Needs Assessment       04/19/17 1510    Living Environment    Lives With spouse    Living Arrangements house    Provides Primary Care For no one    Quality Of Family Relationships supportive    Able to Return to Prior Living Arrangements yes    Discharge Needs Assessment    Concerns To Be Addressed denies needs/concerns at this time    Readmission Within The Last 30 Days no previous admission in last 30 days    Anticipated Changes Related to Illness none    Equipment Currently Used at Home cane, straight    Transportation Available family or friend will provide    Discharge Disposition still a patient            Discharge Plan       04/19/17 1511    Case Management/Social Work Plan    Plan Home with family    Patient/Family In Agreement With Plan yes    Additional Comments Spoke with pt at bedside.  Introduced self and explained role.  CCP contact information provided.  Face sheet verified and IMM received.  Pt states that he is independent at home and uses a cane \"sometimes\".  He states that he does not feel that he will need any home health.  PT notes reviewed.  CCP will follow.        Discharge Placement     No information found                Demographic Summary       04/19/17 1508    Referral Information    Admission Type inpatient    Arrived From home or self-care    Contact Information    Comments permission granted to speak with pt's wife Brii Whyte    Primary Care Physician Information    Name Srikanth Madsen MD            Functional Status       04/19/17 1509    Functional Status Prior    Ambulation 0-->independent    Transferring 0-->independent    Toileting 0-->independent    Bathing 0-->independent    Dressing 0-->independent    Eating 0-->independent    Communication 0-->understands/communicates without difficulty    Swallowing 0-->swallows " foods/liquids without difficulty    IADL    Medications independent    Meal Preparation independent    Housekeeping independent    Laundry independent    Shopping independent    Oral Care independent            Psychosocial     None            Abuse/Neglect     None            Legal     None            Substance Abuse     None            Patient Forms     None          Sol Zimmerman RN

## 2017-04-19 NOTE — PLAN OF CARE
Problem: Patient Care Overview (Adult)  Goal: Plan of Care Review    04/19/17 1456   Coping/Psychosocial Response Interventions   Plan Of Care Reviewed With patient   Outcome Evaluation   Outcome Summary/Follow up Plan VFSS complete. Recommend regular textures with thin liquids.   Patient Care Overview   Progress improving

## 2017-04-19 NOTE — PROGRESS NOTES
"[facility]      Patient ID: Haris Sweeney   Age: 87 y.o.  Sex: male  :  1929  MRN: [id number]                LOS: 2 days   Patient Care Team:  Srikanth Madsen MD as PCP - General (Internal Medicine)      Referring MD:  Mandeep Asif MD    .reason for follow up: syncope and collapse  Chief Complaint   Patient presents with   • Fall   • Syncope     Pt states \"I got to the doorway to go to the bathroom and that's all I remember.  I'm not sure if I hit my head or not.\"     • Back Pain     Pt states that he does have a hx of broken ribs; that he was experiencing pain in that area at this time   • Hypotension     Pt states that he has been \"having low blood pressure this week.  But I took my blood pressure pill anyway because my heart rate was up.\"  Pt did provide log of pressure       Admitting Diagnosis (chief complaint):  Syncope and collapse      HPI       Subjective     Review of Systems   Constitution: Negative for weight gain.   HENT: Positive for headaches. Negative for congestion, hearing loss and sore throat.    Eyes: Negative for blurred vision.   Cardiovascular: Positive for chest pain and syncope. Negative for claudication, cyanosis, dyspnea on exertion, irregular heartbeat, leg swelling, near-syncope, orthopnea, palpitations and paroxysmal nocturnal dyspnea.   Respiratory: Negative for cough, shortness of breath and snoring.    Endocrine: Negative for cold intolerance.   Hematologic/Lymphatic: Negative for adenopathy. Does not bruise/bleed easily.   Skin: Negative for rash.   Musculoskeletal: Negative for back pain.   Gastrointestinal: Negative for abdominal pain, change in bowel habit, constipation and diarrhea.   Genitourinary: Negative for dysuria, frequency, hematuria and hesitancy.   Neurological: Negative for disturbances in coordination, excessive daytime sleepiness, dizziness, focal weakness and loss of balance.   Psychiatric/Behavioral: Negative for memory loss. The " patient is not nervous/anxious.    Allergic/Immunologic: Negative for hives.       Past Medical History:   Diagnosis Date   • A-fib    • Bladder cancer    • Kidney stones    • Macular degeneration    • Thyroid goiter        Past Surgical History:   Procedure Laterality Date   • CATARACT EXTRACTION     • CATARACT EXTRACTION     • THYROIDECTOMY     • TOTAL THYROIDECTOMY         Social History     Social History   • Marital status:      Spouse name: N/A   • Number of children: N/A   • Years of education: N/A     Occupational History   • Not on file.     Social History Main Topics   • Smoking status: Former Smoker     Types: Cigarettes   • Smokeless tobacco: Never Used   • Alcohol use No   • Drug use: No   • Sexual activity: Not on file     Other Topics Concern   • Not on file     Social History Narrative       Family History   Problem Relation Age of Onset   • Heart disease Mother    • No Known Problems Father    • Prostate cancer Brother        History of Present Illness      Today, denies cp, chest discomfort, SOB, dizziness, palpitations.    Allergies   Allergen Reactions   • Digoxin And Related    • Keflex [Cephalexin]             Current Meds:   Current Facility-Administered Medications   Medication Dose Route Frequency Provider Last Rate Last Dose   • acetaminophen (TYLENOL) tablet 650 mg  650 mg Oral Q4H PRN Mandeep Asif MD   650 mg at 04/18/17 0805    Or   • acetaminophen (TYLENOL) suppository 650 mg  650 mg Rectal Q4H PRN Mandeep Asif MD       • aluminum-magnesium hydroxide-simethicone (MAALOX/MYLANTA) suspension 30 mL  30 mL Oral Q6H PRN Mandeep Asif MD       • bisacodyl (DULCOLAX) EC tablet 5 mg  5 mg Oral Daily PRN Mandeep Asif MD       • bisacodyl (DULCOLAX) suppository 10 mg  10 mg Rectal Daily PRN Mandeep Asif MD       • citalopram (CeleXA) tablet 10 mg  10 mg Oral Daily Primo Salguero MD   10 mg at 04/19/17 0859   • dextrose (D50W) solution 25 g  25 g  "Intravenous Q15 Min PRN Primo Salguero MD       • dextrose (GLUTOSE) oral gel 15 g  15 g Oral Q15 Min PRN Primo Salguero MD       • finasteride (PROSCAR) tablet 5 mg  5 mg Oral Daily Primo Salguero MD   5 mg at 04/19/17 0859   • glucagon (human recombinant) (GLUCAGEN DIAGNOSTIC) injection 1 mg  1 mg Subcutaneous Q15 Min PRN Primo Salguero MD       • insulin aspart (novoLOG) injection 0-9 Units  0-9 Units Subcutaneous 4x Daily With Meals & Nightly Primo Salguero MD   4 Units at 04/18/17 1209   • ipratropium-albuterol (DUO-NEB) nebulizer solution 3 mL  3 mL Nebulization Q6H PRN Mandeep Asif MD       • levothyroxine (SYNTHROID, LEVOTHROID) tablet 100 mcg  100 mcg Oral Q AM Primo Salguero MD   100 mcg at 04/19/17 0642   • ondansetron (ZOFRAN) tablet 4 mg  4 mg Oral Q6H PRN Mandeep Asif MD        Or   • ondansetron ODT (ZOFRAN-ODT) disintegrating tablet 4 mg  4 mg Oral Q6H PRN Mandeep Asif MD        Or   • ondansetron (ZOFRAN) injection 4 mg  4 mg Intravenous Q6H PRN Mandeep Asif MD       • pantoprazole (PROTONIX) EC tablet 40 mg  40 mg Oral Q AM Primo Salguero MD   40 mg at 04/19/17 0642   • sodium chloride 0.9 % flush 1-10 mL  1-10 mL Intravenous PRN Mandeep Asif MD       • sodium chloride 0.9 % flush 10 mL  10 mL Intravenous PRN Logan Cabral MD             Medication Review:     citalopram 10 mg Oral Daily   finasteride 5 mg Oral Daily   insulin aspart 0-9 Units Subcutaneous 4x Daily With Meals & Nightly   levothyroxine 100 mcg Oral Q AM   pantoprazole 40 mg Oral Q AM         Objective     Vital Signs  Temp:  [97.5 °F (36.4 °C)-98.3 °F (36.8 °C)] 97.9 °F (36.6 °C)  Heart Rate:  [65-91] 82  Resp:  [15-17] 15  BP: ()/() 112/85    /85  Pulse 82  Temp 97.9 °F (36.6 °C)  Resp 15  Ht 64\" (162.6 cm)  Wt 162 lb (73.5 kg)  SpO2 98%  BMI 27.81 kg/m2    OBJNOHEADERBEGIN@ Physical Exam   Constitutional: He is oriented to person, place, and time. He appears " well-developed and well-nourished. No distress.   HENT:   Head: Normocephalic and atraumatic.   Right Ear: External ear normal.   Left Ear: External ear normal.   Mouth/Throat: Oropharynx is clear and moist. No oropharyngeal exudate.   Eyes: Conjunctivae and EOM are normal. Pupils are equal, round, and reactive to light. No scleral icterus.   Neck: Normal range of motion. Neck supple. No JVD present. No tracheal deviation present. No thyromegaly present.   Cardiovascular: Normal rate, regular rhythm, S1 normal, S2 normal, normal heart sounds and intact distal pulses.  PMI is not displaced.  Exam reveals no gallop, no distant heart sounds, no friction rub and no decreased pulses.    No murmur heard.  Pulmonary/Chest: Effort normal and breath sounds normal. No accessory muscle usage. No respiratory distress. He has no wheezes. He has no rales. He exhibits no tenderness.   Abdominal: Soft. Bowel sounds are normal. He exhibits no distension and no mass. There is no tenderness. There is no rebound and no guarding.   Musculoskeletal: Normal range of motion. He exhibits no edema, tenderness or deformity.   Lymphadenopathy:     He has no cervical adenopathy.   Neurological: He is alert and oriented to person, place, and time. He has normal reflexes. No cranial nerve deficit. Coordination normal.   Skin: Skin is dry. No rash noted. He is not diaphoretic. No erythema. No pallor.   Psychiatric: He has a normal mood and affect.         WEIGHTS:     Wt Readings from Last 1 Encounters:   04/18/17 2000 162 lb (73.5 kg)   04/17/17 2306 158 lb 11.7 oz (72 kg)   04/17/17 1912 153 lb (69.4 kg)       Results Review:     I reviewed the patient's new clinical results    LABS:  Lab Results   Component Value Date    CALCIUM 8.3 (L) 04/19/2017   @LABRCNTIP(MG:3,NA:3,K:3,CL:3,co2:3,bun:3,  creatinine:3,labglom:3,glucose,calcium:3,WBC:3,HGB:3,PLT:3,ALT:3,AST:3)@  Lab Results   Component Value Date    CKTOTAL 93 04/18/2017    CKMB 3.34  04/18/2017    TROPONINT 0.018 04/18/2017     Estimated Creatinine Clearance: 56.9 mL/min (by C-G formula based on Cr of 0.84).  No results found for: CHOL  Lab Results   Component Value Date    HDL 49 10/05/2016     Lab Results   Component Value Date     (H) 10/05/2016     Lab Results   Component Value Date    TRIG 92 10/05/2016     No components found for: CHOLHDL  @RESUFAST(GLUCOSE,BUN,CREATININE,EGFRIFNONA,EGFRIFAFRI,BCR,  SODIUM,POTASSIUM,CHLORIDE,CO2,CALCIUM,PROTENTOTREF,ALBUMIN,  GLOBULIN,LABIL2,BILIRUBIN,ALK PHOS,AST,ALT)@  @RESUFAST(GLUCOSE,CALCIUM,NA,K,CO2,CL,BUN,CREATININE,EGFRIFAFRI,  EGFRIFNONA,BCR,ANIONGAP)@  Lab Results   Component Value Date    WBC 5.59 04/19/2017    HGB 12.7 (L) 04/19/2017    HCT 38.0 (L) 04/19/2017    MCV 99.5 (H) 04/19/2017     04/19/2017     No results found for: DDIMER  Lab Results   Component Value Date    TSH 1.550 04/18/2017     Lab Results   Component Value Date    CKTOTAL 93 04/18/2017     No results found for: DIGOXIN  Lab Results   Component Value Date    INR 1.19 (H) 04/18/2017    INR 1.65 (H) 04/17/2017    INR 1.3 (H) 01/20/2014    PROTIME 14.7 (H) 04/18/2017    PROTIME 19.0 (H) 04/17/2017    PROTIME 13.9 (H) 01/20/2014     Estimated Creatinine Clearance: 56.9 mL/min (by C-G formula based on Cr of 0.84).      Results from last 7 days  Lab Units 04/18/17  1318 04/17/17 2011   CK TOTAL U/L 93  --    TROPONIN T ng/mL 0.018 0.035*       Results from last 7 days  Lab Units 04/18/17  1318   MAGNESIUM mg/dL 2.0       Results from last 7 days  Lab Units 04/19/17  1027   SODIUM mmol/L 138   POTASSIUM mmol/L 4.9   BUN mg/dL 23   CREATININE mg/dL 0.84   CALCIUM mg/dL 8.3*     @LABRCNTIPbnp@    Results from last 7 days  Lab Units 04/19/17  1027 04/17/17 2011   WBC 10*3/mm3 5.59 8.97   HEMOGLOBIN g/dL 12.7* 14.7   HEMATOCRIT % 38.0* 44.1   PLATELETS 10*3/mm3 160 174       Results from last 7 days  Lab Units 04/18/17  0546 04/17/17 2011   INR  1.19* 1.65*       Lab  Results (last 24 hours)     Procedure Component Value Units Date/Time    Magnesium [[id number]]  (Normal) Collected:  04/18/17 1318    Specimen:  Blood Updated:  04/18/17 1450     Magnesium 2.0 mg/dL     BNP [[id number]]  (Normal) Collected:  04/18/17 1318    Specimen:  Blood Updated:  04/18/17 1454     proBNP 1667.0 pg/mL     Narrative:       Among patients with dyspnea, NT-proBNP is highly sensitive for the detection of acute congestive heart failure. In addition NT-proBNP of <300 pg/ml effectively rules out acute congestive heart failure with 99% negative predictive value.    TSH [[id number]]  (Normal) Collected:  04/18/17 1318    Specimen:  Blood Updated:  04/18/17 1454     TSH 1.550 mIU/mL     T4, Free [[id number]]  (Normal) Collected:  04/18/17 1318    Specimen:  Blood Updated:  04/18/17 1455     Free T4 1.11 ng/dL     CK Total & CKMB [[id number]]  (Normal) Collected:  04/18/17 1318    Specimen:  Blood Updated:  04/18/17 1515     CKMB 3.34 ng/mL      Creatine Kinase 93 U/L     Troponin [[id number]]  (Normal) Collected:  04/18/17 1318    Specimen:  Blood Updated:  04/18/17 1515     Troponin T 0.018 ng/mL     Narrative:       Troponin T Reference Ranges:  Less than 0.03 ng/mL:    Negative for AMI  0.03 to 0.09 ng/mL:      Indeterminant for AMI  Greater than 0.09 ng/mL: Positive for AMI    POC Glucose Fingerstick [[id number]]  (Normal) Collected:  04/18/17 1525    Specimen:  Blood Updated:  04/18/17 1526     Glucose 108 mg/dL     Narrative:       Meter: [id number] : [id number] Edu Nehal    T3, Free [[id number]]  (Abnormal) Collected:  04/18/17 1318    Specimen:  Blood Updated:  04/18/17 1728     T3, Free 1.27 (L) pg/mL     POC Glucose Fingerstick [[id number]]  (Abnormal) Collected:  04/18/17 2012    Specimen:  Blood Updated:  04/18/17 2014     Glucose 180 (H) mg/dL     Narrative:       Meter: [id number] : [id number] Beverly Flores    POC Glucose Fingerstick [[id number]]  (Normal) Collected:  04/19/17 0751    Specimen:   Blood Updated:  04/19/17 0752     Glucose 79 mg/dL     Narrative:       Meter: [id number] : [id number] Miky PERERA    CBC & Differential [[id number]] Collected:  04/19/17 1027    Specimen:  Blood Updated:  04/19/17 1127    Narrative:       The following orders were created for panel order CBC & Differential.  Procedure                               Abnormality         Status                     ---------                               -----------         ------                     CBC Auto Differential[[id number]]         Abnormal            Final result                 Please view results for these tests on the individual orders.    CBC Auto Differential [[id number]]  (Abnormal) Collected:  04/19/17 1027    Specimen:  Blood Updated:  04/19/17 1127     WBC 5.59 10*3/mm3      RBC 3.82 (L) 10*6/mm3      Hemoglobin 12.7 (L) g/dL      Hematocrit 38.0 (L) %      MCV 99.5 (H) fL      MCH 33.2 (H) pg      MCHC 33.4 g/dL      RDW 15.1 (H) %      RDW-SD 55.3 (H) fl      MPV 10.7 fL      Platelets 160 10*3/mm3      Neutrophil % 57.3 %      Lymphocyte % 25.9 %      Monocyte % 14.1 (H) %      Eosinophil % 1.8 %      Basophil % 0.2 %      Immature Grans % 0.7 (H) %      Neutrophils, Absolute 3.20 10*3/mm3      Lymphocytes, Absolute 1.45 10*3/mm3      Monocytes, Absolute 0.79 10*3/mm3      Eosinophils, Absolute 0.10 10*3/mm3      Basophils, Absolute 0.01 10*3/mm3      Immature Grans, Absolute 0.04 (H) 10*3/mm3     Basic Metabolic Panel [[id number]]  (Abnormal) Collected:  04/19/17 1027    Specimen:  Blood Updated:  04/19/17 1145     Glucose 138 (H) mg/dL      BUN 23 mg/dL      Creatinine 0.84 mg/dL      Sodium 138 mmol/L      Potassium 4.9 mmol/L      Chloride 104 mmol/L      CO2 22.7 mmol/L      Calcium 8.3 (L) mg/dL      eGFR Non African Amer 86 mL/min/1.73      BUN/Creatinine Ratio 27.4 (H)     Anion Gap 11.3 mmol/L     Narrative:       The MDRD GFR formula is only valid for adults with stable renal function between ages 18 and  70.          Imaging Results (last 72 hours)     Procedure Component Value Units Date/Time    CT Head Without Contrast [[id number]] Collected:  04/17/17 2051     Updated:  04/17/17 2059    Narrative:       CT SCAN OF THE HEAD WITHOUT CONTRAST     CLINICAL HISTORY: Syncope.     TECHNIQUE: Transverse 3 mm thick images were acquired from the base of  the skull to the vertex without IV contrast.     COMPARISON: None     FINDINGS: There is mild diffuse cortical atrophy. There is a small focus  of acute subarachnoid blood within a sulcus in the anterior aspect of  the right temporal lobe. Minimal subarachnoid hemorrhage is also evident  within several sulci in the superior aspects of both frontal lobes. No  other areas of intracranial hemorrhage are identified. There is no mass  effect. There is no evidence of recent or old infarct. Bone window  images demonstrate no skull fractures. 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       Impression:       Minimal acute subarachnoid hemorrhage in the right temporal  lobe and both frontal lobes as described. There is no associated mass  effect. This is quite likely posttraumatic subarachnoid hemorrhage.     This report was finalized on 4/17/2017 8:55 PM by Dr. Buddy Chand MD.       CT Head Without Contrast [[id number]] Collected:  04/18/17 0445     Updated:  04/19/17 1256    Narrative:       CT SCAN OF THE BRAIN WITHOUT CONTRAST.     TECHNIQUE: Multiple axial images of the brain were obtained from the  vertex to the base of the brain.     HISTORY:  Intracranial hemorrhage, follow-up.     COMPARISON:  04/17/2017.     FINDINGS:   Midline structures are within normal limits, there is no hydrocephalus.  Gray-white matter differentiation is maintained. The previously seen  acute subarachnoid blood layering the right temporal lobe is unchanged.     Complete resolution of previously seen left frontal lobe subarachnoid  hemorrhage, minimal  blood remains. No significant hemorrhage is seen in  the right frontal lobe. There are chronic hygromas present bilaterally.     Orbits are within normal limits. No significant mucosal disease of the  para-nasal sinuses. Calcified 2.8 x 1.3 cm lesion of the right frontal  sinus is unchanged, it may be an osteoma or calcified secretions.  Mastoid air cells are well aerated.             Impression:       Persistent small amount of acute right temporal subarachnoid blood.   Decreased amount of left frontal subarachnoid hemorrhage, follow-up is  recommended.           This report was finalized on 4/19/2017 12:53 PM by Dr. Ishaan James MD.       XR Spine Thoracic 3 View [[id number]] Collected:  04/17/17 2115     Updated:  04/19/17 1300    Narrative:       X-RAY THORACIC SPINE 3 VIEWS.     HISTORY: Fall, upper back pain.     COMPARISON: No prior studies for comparison.     FINDINGS:  Vertebral body height is normal, no acute fracture.  Multilevel loss of  intervertebral disc height and spurring, calcification of the anterior  longitudinal ligament, a diagnosis of DISH may be considered. There is  an old compression fracture of T8.     Prevertebral soft tissues are unremarkable.       Impression:       No acute fracture or subluxation of the thoracic spine. Old fracture of  T8.     This report was finalized on 4/19/2017 12:56 PM by Dr. Ishaan James MD.       XR Chest 2 View [[id number]] Collected:  04/17/17 2117     Updated:  04/19/17 1300    Narrative:       CHEST PA AND LATERAL.     HISTORY: Fall, back pain, atrial fibrillation.     COMPARISON: No prior studies for comparison.     FINDINGS:  Cardiomediastinal silhouette is within normal limits.         There is no consolidation or effusion. Chronic lung changes are present.  Old compression fracture of T8.          Impression:       No acute findings.         This report was finalized on 4/19/2017 12:56 PM by Dr. Ishaan James MD.             Imaging Results (all)      Procedure Component Value Units Date/Time    CT Head Without Contrast [[id number]] Collected:  04/17/17 2051     Updated:  04/17/17 2059    Narrative:       CT SCAN OF THE HEAD WITHOUT CONTRAST     CLINICAL HISTORY: Syncope.     TECHNIQUE: Transverse 3 mm thick images were acquired from the base of  the skull to the vertex without IV contrast.     COMPARISON: None     FINDINGS: There is mild diffuse cortical atrophy. There is a small focus  of acute subarachnoid blood within a sulcus in the anterior aspect of  the right temporal lobe. Minimal subarachnoid hemorrhage is also evident  within several sulci in the superior aspects of both frontal lobes. No  other areas of intracranial hemorrhage are identified. There is no mass  effect. There is no evidence of recent or old infarct. Bone window  images demonstrate no skull fractures. 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       Impression:       Minimal acute subarachnoid hemorrhage in the right temporal  lobe and both frontal lobes as described. There is no associated mass  effect. This is quite likely posttraumatic subarachnoid hemorrhage.     This report was finalized on 4/17/2017 8:55 PM by Dr. Buddy Chand MD.       CT Head Without Contrast [[id number]] Collected:  04/18/17 0445     Updated:  04/19/17 1256    Narrative:       CT SCAN OF THE BRAIN WITHOUT CONTRAST.     TECHNIQUE: Multiple axial images of the brain were obtained from the  vertex to the base of the brain.     HISTORY:  Intracranial hemorrhage, follow-up.     COMPARISON:  04/17/2017.     FINDINGS:   Midline structures are within normal limits, there is no hydrocephalus.  Gray-white matter differentiation is maintained. The previously seen  acute subarachnoid blood layering the right temporal lobe is unchanged.     Complete resolution of previously seen left frontal lobe subarachnoid  hemorrhage, minimal blood remains. No significant hemorrhage is  seen in  the right frontal lobe. There are chronic hygromas present bilaterally.     Orbits are within normal limits. No significant mucosal disease of the  para-nasal sinuses. Calcified 2.8 x 1.3 cm lesion of the right frontal  sinus is unchanged, it may be an osteoma or calcified secretions.  Mastoid air cells are well aerated.             Impression:       Persistent small amount of acute right temporal subarachnoid blood.   Decreased amount of left frontal subarachnoid hemorrhage, follow-up is  recommended.           This report was finalized on 4/19/2017 12:53 PM by Dr. Ishaan James MD.       XR Spine Thoracic 3 View [[id number]] Collected:  04/17/17 2115     Updated:  04/19/17 1300    Narrative:       X-RAY THORACIC SPINE 3 VIEWS.     HISTORY: Fall, upper back pain.     COMPARISON: No prior studies for comparison.     FINDINGS:  Vertebral body height is normal, no acute fracture.  Multilevel loss of  intervertebral disc height and spurring, calcification of the anterior  longitudinal ligament, a diagnosis of DISH may be considered. There is  an old compression fracture of T8.     Prevertebral soft tissues are unremarkable.       Impression:       No acute fracture or subluxation of the thoracic spine. Old fracture of  T8.     This report was finalized on 4/19/2017 12:56 PM by Dr. Ishaan James MD.       XR Chest 2 View [[id number]] Collected:  04/17/17 2117     Updated:  04/19/17 1300    Narrative:       CHEST PA AND LATERAL.     HISTORY: Fall, back pain, atrial fibrillation.     COMPARISON: No prior studies for comparison.     FINDINGS:  Cardiomediastinal silhouette is within normal limits.         There is no consolidation or effusion. Chronic lung changes are present.  Old compression fracture of T8.          Impression:       No acute findings.         This report was finalized on 4/19/2017 12:56 PM by Dr. Ishaan James MD.             ECG/EMG Results (last 24 hours)     Procedure Component Value Units  Date/Time    ECG 12 Lead [[id number]] Collected:  04/18/17 1443     Updated:  04/19/17 0113    Narrative:       RR Interval= 698 ms  IA Interval= ms  QRSD Interval= 88 ms  QT Interval= 412 ms  QTc Interval= 493 ms  Heart Rate= 86 ms  P Axis= deg  QRS Axis= -38 deg  T Wave Axis= 46 deg  I: 40 Axis= -29 deg  T: 40 Axis= -52 deg  ST Axis= 53 deg  ATRIAL FIBRILLATION, V-RATE   LEFT AXIS DEVIATION  LOW VOLTAGE IN FRONTAL LEADS  PROLONGED QT INTERVAL  NO SIGNIFICANT CHANGE FROM PREVIOUS ECG  Electronically Signed by:  Eleuterio Toscano 19-Apr-2017 01:09:24  Date and Time of Study: 2017-04-18 14:44:42    ECG 12 Lead [[id number]] Collected:  04/19/17 0333     Updated:  04/19/17 0340    Narrative:       RR Interval= 769 ms  IA Interval= ms  QRSD Interval= 84 ms  QT Interval= 416 ms  QTc Interval= 474 ms  Heart Rate= 78 ms  P Axis= deg  QRS Axis= -33 deg  T Wave Axis= 42 deg  I: 40 Axis= -15 deg  T: 40 Axis= -52 deg  ST Axis= -85 deg  ATRIAL FIBRILLATION  LEFT AXIS DEVIATION  LOW VOLTAGE IN FRONTAL LEADS  Electronically Signed by:  Date and Time of Study: 2017-04-19 03:33:16              Assessment:         [unfilled]    Active Problems:    Chronic anticoagulation    Traumatic subarachnoid hemorrhage with loss of consciousness of 30 minutes or less    Syncope and collapse      Assessment/Plan   Syncope and collapse: LOC 30 min  Hypotension (hypertension with history of A. fib now off anticoagulation)- running low range SBP 90s to 107  Atrial fibrillation with RVR and takes anticoagulation-Coumadin on hold. VR controlled  Anticoagulation-on hold.     Indeterminate troponin-0.035 on 4/17 at 2011  Nonischemic cardiomyopathy  Hyperlipidemia  Dizziness  Had a fall.     Denies any chest pain or chest discomfort. Initial troponin on 4/17 2011 of 0.035 dizziness. Had a fall. Difficult situation in the setting of A. fib without anticoagulation I have discussed with the patient the risks off anticoagulation therapy which include increased  risk of systemic embolization such as stroke.  On anticoagulation at this time, possible risk for adverse worsening hemorrhage/bleeding The patient verbalize understanding.   His syncope can be multifactorial in origin consider including side effect from medicines, hypovolemia, low blood pressure, A. fib. Neuro consult suggests his syncopal risk needs to be addressed prior to restarting Coumadin.      Will obtain Holter monitor to evaluate syncope and collapse and check for any significant arrhythmia and/or pause.     He had echocardiogram with Doppler November 2016 with normal LV systolic function and wall motion. He denies any chest pain or chest discomfort.     Will consider Cardiolite stress test when clinically stable to evaluate for possible ischemic. EKG shows no acute ischemic ST T changes.      We'll monitor troponin, cardiac enzymes and EKG. Monitor proBNP, JVP, BMP and weight.     Ongoing management by Dr. Mcclellan. CORTNEY MOSQUERA        I not only counseled the patient today on the significant factors noted in the assessment and plan, but I also recommended that the patient reduce salt and saturated animal fat intake in diet, as well as to perform scheduled exercise on a regular basis.     I agree with above note. I personally carried out physical exam, reviewed all pertinent clinical data, discussed assessment with APRN, then determined optimal plan.   I have also discussed the assessment and plan with family.     The patient is a 87-year-old white male with chronic atrial fibrillation on Coumadin, history of thyroidectomy on replacement therapy, who presents for syncope. He is been keeping track of his blood pressure heart rate readings at home and noted that his blood pressure was so low that he quit taking Coreg on his own. His blood sugar then got low and the stopped his diabetes medication. He then developed a virus possibly the flu about 5 days prior to admission. He became very weak  and thought he was dehydrated. His sitting blood pressure dropped to as low as 76 systolic. That is when a passed out. He was brought to the emergency room where   CT showed acute intracranial hemorrhage. His Coumadin has been stopped. Agree with holding the Coumadin until okayed to restart by neurosurgery.    No new medicines were added that could cause his blood pressure to drop. He had been losing weight again became dehydrated. He is receiving IV fluid and his blood pressure now is 130/80. His last echocardiogram in November 2016 showed normal ejection fraction, diastolic dysfunction, mild to moderate left atrial enlargement. An echo will be repeated. Holter monitor has been placed.     I not only counseled the patient today on the significant factors noted in the assessment and plan, but I also recommended that the patient reduce salt and saturated animal fat intake in diet, as well as to perform scheduled exercise on a regular basis.      4/19    Still in ICU. Repeat echo is pending. Holter in progress. Remains in afib with VR controlled. BP is ok.  He passed swallow study today.     CT showed acute intracranial hemorrhage. His Coumadin has been stopped. Agree with holding the Coumadin until okayed to restart by neurosurgery.    Discussion for transfer out of ICU     Ongoing management by EVELINE Phan  04/19/17  2:18 PM     I agree with above note.  I personally carried out physical exam, reviewed all pertinent clinical data, discussed assessment with EVELINE, then determined optimal plan.    I have also discussed the assessment and plan with family.    I suspect that his syncopal episode was due to very low blood pressure, secondary to taking blood pressure medications on top of becoming dehydrated.  He did record his blood pressure and heart rate up to the point he passed out, with the final systolic blood pressure in the 70s before he passed out.   Blood pressure is better after IV fluid hydration.    Echocardiogram showed LVEF 55% with mildly reduced right ventricle systolic function, moderate to severe left atrial enlargement.  Mild aortic regurgitation, mild to moderate mitral regurgitation and mild aortic valve stenosis.  I don't think any of these abnormalities would've contributed to his syncopal episode.    He will need a Cardiolite stress test prior to discharge.    Eleuterio Toscano M.D.  April 19, 2017      EMR Dragon/Transcription disclaimer:   Much of this encounter note is an electronic transcription/translation of spoken language to printed text. The electronic translation of spoken language may permit erroneous, or at times, nonsensical words or phrases to be inadvertently transcribed; Although I have reviewed the note for such errors, some may still exist.

## 2017-04-20 ENCOUNTER — APPOINTMENT (OUTPATIENT)
Dept: NUCLEAR MEDICINE | Facility: HOSPITAL | Age: 82
End: 2017-04-20

## 2017-04-20 VITALS
DIASTOLIC BLOOD PRESSURE: 86 MMHG | WEIGHT: 162 LBS | TEMPERATURE: 98.6 F | RESPIRATION RATE: 18 BRPM | OXYGEN SATURATION: 98 % | HEART RATE: 74 BPM | HEIGHT: 64 IN | BODY MASS INDEX: 27.66 KG/M2 | SYSTOLIC BLOOD PRESSURE: 120 MMHG

## 2017-04-20 LAB
BH CV ECHO MEAS - ACS: 2 CM
BH CV ECHO MEAS - AI DEC SLOPE: 361 CM/SEC^2
BH CV ECHO MEAS - AI MAX PG: 74.3 MMHG
BH CV ECHO MEAS - AI MAX VEL: 431 CM/SEC
BH CV ECHO MEAS - AI P1/2T: 349.7 MSEC
BH CV ECHO MEAS - AO MAX PG (FULL): 10 MMHG
BH CV ECHO MEAS - AO MAX PG: 10.2 MMHG
BH CV ECHO MEAS - AO MEAN PG (FULL): 4 MMHG
BH CV ECHO MEAS - AO MEAN PG: 5 MMHG
BH CV ECHO MEAS - AO V2 MAX: 160 CM/SEC
BH CV ECHO MEAS - AO V2 MEAN: 107 CM/SEC
BH CV ECHO MEAS - AO V2 VTI: 33.2 CM
BH CV ECHO MEAS - AVA(I,A): 1.5 CM^2
BH CV ECHO MEAS - AVA(I,D): 1.5 CM^2
BH CV ECHO MEAS - AVA(V,A): 1.7 CM^2
BH CV ECHO MEAS - AVA(V,D): 1.7 CM^2
BH CV ECHO MEAS - BSA(HAYCOCK): 1.8 M^2
BH CV ECHO MEAS - BSA: 1.8 M^2
BH CV ECHO MEAS - BZI_BMI: 27.8 KILOGRAMS/M^2
BH CV ECHO MEAS - BZI_METRIC_HEIGHT: 162.6 CM
BH CV ECHO MEAS - BZI_METRIC_WEIGHT: 73.5 KG
BH CV ECHO MEAS - CONTRAST EF (2CH): 52.1 ML/M^2
BH CV ECHO MEAS - CONTRAST EF 4CH: 54.9 ML/M^2
BH CV ECHO MEAS - EDV(CUBED): 97.3 ML
BH CV ECHO MEAS - EDV(MOD-SP2): 94 ML
BH CV ECHO MEAS - EDV(MOD-SP4): 82 ML
BH CV ECHO MEAS - EDV(TEICH): 97.3 ML
BH CV ECHO MEAS - EF(CUBED): 69.4 %
BH CV ECHO MEAS - EF(MOD-SP2): 52.1 %
BH CV ECHO MEAS - EF(MOD-SP4): 54.9 %
BH CV ECHO MEAS - EF(TEICH): 61 %
BH CV ECHO MEAS - ESV(CUBED): 29.8 ML
BH CV ECHO MEAS - ESV(MOD-SP2): 45 ML
BH CV ECHO MEAS - ESV(MOD-SP4): 37 ML
BH CV ECHO MEAS - ESV(TEICH): 37.9 ML
BH CV ECHO MEAS - FS: 32.6 %
BH CV ECHO MEAS - IVS/LVPW: 0.9
BH CV ECHO MEAS - IVSD: 0.9 CM
BH CV ECHO MEAS - LAT PEAK E' VEL: 10 CM/SEC
BH CV ECHO MEAS - LV DIASTOLIC VOL/BSA (35-75): 45.8 ML/M^2
BH CV ECHO MEAS - LV MASS(C)D: 148.1 GRAMS
BH CV ECHO MEAS - LV MASS(C)DI: 82.8 GRAMS/M^2
BH CV ECHO MEAS - LV MAX PG: 2 MMHG
BH CV ECHO MEAS - LV MEAN PG: 1 MMHG
BH CV ECHO MEAS - LV SYSTOLIC VOL/BSA (12-30): 20.7 ML/M^2
BH CV ECHO MEAS - LV V1 MAX: 70.1 CM/SEC
BH CV ECHO MEAS - LV V1 MEAN: 46.7 CM/SEC
BH CV ECHO MEAS - LV V1 VTI: 13.1 CM
BH CV ECHO MEAS - LVIDD: 4.6 CM
BH CV ECHO MEAS - LVIDS: 3.1 CM
BH CV ECHO MEAS - LVLD AP2: 7 CM
BH CV ECHO MEAS - LVLD AP4: 6.9 CM
BH CV ECHO MEAS - LVLS AP2: 6.3 CM
BH CV ECHO MEAS - LVLS AP4: 6.2 CM
BH CV ECHO MEAS - LVOT AREA (M): 3.8 CM^2
BH CV ECHO MEAS - LVOT AREA: 3.8 CM^2
BH CV ECHO MEAS - LVOT DIAM: 2.2 CM
BH CV ECHO MEAS - LVPWD: 1 CM
BH CV ECHO MEAS - MED PEAK E' VEL: 6 CM/SEC
BH CV ECHO MEAS - MV DEC SLOPE: 790 CM/SEC^2
BH CV ECHO MEAS - MV DEC TIME: 0.14 SEC
BH CV ECHO MEAS - MV E MAX VEL: 130 CM/SEC
BH CV ECHO MEAS - MV MAX PG: 9.1 MMHG
BH CV ECHO MEAS - MV MEAN PG: 3 MMHG
BH CV ECHO MEAS - MV P1/2T MAX VEL: 143 CM/SEC
BH CV ECHO MEAS - MV P1/2T: 53 MSEC
BH CV ECHO MEAS - MV V2 MAX: 151 CM/SEC
BH CV ECHO MEAS - MV V2 MEAN: 79.2 CM/SEC
BH CV ECHO MEAS - MV V2 VTI: 24.2 CM
BH CV ECHO MEAS - MVA P1/2T LCG: 1.5 CM^2
BH CV ECHO MEAS - MVA(P1/2T): 4.1 CM^2
BH CV ECHO MEAS - MVA(VTI): 2.1 CM^2
BH CV ECHO MEAS - PA ACC TIME: 0.09 SEC
BH CV ECHO MEAS - PA MAX PG (FULL): 3.1 MMHG
BH CV ECHO MEAS - PA MAX PG: 4.7 MMHG
BH CV ECHO MEAS - PA PR(ACCEL): 37.6 MMHG
BH CV ECHO MEAS - PA V2 MAX: 108 CM/SEC
BH CV ECHO MEAS - PULM DIAS VEL: 46 CM/SEC
BH CV ECHO MEAS - PULM S/D: 0.57
BH CV ECHO MEAS - PULM SYS VEL: 26.1 CM/SEC
BH CV ECHO MEAS - PVA(V,A): 2.7 CM^2
BH CV ECHO MEAS - PVA(V,D): 2.7 CM^2
BH CV ECHO MEAS - QP/QS: 1.1
BH CV ECHO MEAS - RAP SYSTOLE: 8 MMHG
BH CV ECHO MEAS - RV MAX PG: 1.6 MMHG
BH CV ECHO MEAS - RV MEAN PG: 1 MMHG
BH CV ECHO MEAS - RV V1 MAX: 63.5 CM/SEC
BH CV ECHO MEAS - RV V1 MEAN: 42.6 CM/SEC
BH CV ECHO MEAS - RV V1 VTI: 12.1 CM
BH CV ECHO MEAS - RVOT AREA: 4.5 CM^2
BH CV ECHO MEAS - RVOT DIAM: 2.4 CM
BH CV ECHO MEAS - RVSP: 54 MMHG
BH CV ECHO MEAS - SI(CUBED): 37.8 ML/M^2
BH CV ECHO MEAS - SI(LVOT): 27.8 ML/M^2
BH CV ECHO MEAS - SI(MOD-SP2): 27.4 ML/M^2
BH CV ECHO MEAS - SI(MOD-SP4): 25.2 ML/M^2
BH CV ECHO MEAS - SI(TEICH): 33.2 ML/M^2
BH CV ECHO MEAS - SV(CUBED): 67.5 ML
BH CV ECHO MEAS - SV(LVOT): 49.8 ML
BH CV ECHO MEAS - SV(MOD-SP2): 49 ML
BH CV ECHO MEAS - SV(MOD-SP4): 45 ML
BH CV ECHO MEAS - SV(RVOT): 54.7 ML
BH CV ECHO MEAS - SV(TEICH): 59.4 ML
BH CV ECHO MEAS - TAPSE (>1.6): 1.3 CM2
BH CV ECHO MEAS - TR MAX VEL: 339 CM/SEC
BH CV STRESS COMMENTS STAGE 1: NORMAL
BH CV STRESS DOSE REGADENOSON STAGE 1: 0.4
BH CV STRESS DURATION MIN STAGE 1: 0
BH CV STRESS DURATION SEC STAGE 1: 15
BH CV STRESS PROTOCOL 1: NORMAL
BH CV STRESS RECOVERY BP: NORMAL MMHG
BH CV STRESS RECOVERY HR: 84 BPM
BH CV STRESS STAGE 1: 1
BH CV XLRA - RV BASE: 4.6 CM
BH CV XLRA - RV LENGTH: 6.8 CM
BH CV XLRA - RV MID: 3 CM
BH CV XLRA - TDI S': 9 CM/SEC
E/E' RATIO: 17
GLUCOSE BLDC GLUCOMTR-MCNC: 127 MG/DL (ref 70–130)
GLUCOSE BLDC GLUCOMTR-MCNC: 166 MG/DL (ref 70–130)
LEFT ATRIUM VOLUME INDEX: 51 ML/M2
LEFT ATRIUM VOLUME: 86 CM3
LV EF 2D ECHO EST: 55 %
LV EF NUC BP: 74 %
MAXIMAL PREDICTED HEART RATE: 133 BPM
PERCENT MAX PREDICTED HR: 84.21 %
STRESS BASELINE BP: NORMAL MMHG
STRESS BASELINE HR: 98 BPM
STRESS PERCENT HR: 99 %
STRESS POST PEAK BP: NORMAL MMHG
STRESS POST PEAK HR: 112 BPM
STRESS TARGET HR: 113 BPM

## 2017-04-20 PROCEDURE — 97110 THERAPEUTIC EXERCISES: CPT

## 2017-04-20 PROCEDURE — 93016 CV STRESS TEST SUPVJ ONLY: CPT | Performed by: INTERNAL MEDICINE

## 2017-04-20 PROCEDURE — 93018 CV STRESS TEST I&R ONLY: CPT | Performed by: INTERNAL MEDICINE

## 2017-04-20 PROCEDURE — 82962 GLUCOSE BLOOD TEST: CPT

## 2017-04-20 PROCEDURE — 78452 HT MUSCLE IMAGE SPECT MULT: CPT | Performed by: INTERNAL MEDICINE

## 2017-04-20 PROCEDURE — 0 TECHNETIUM SESTAMIBI: Performed by: INTERNAL MEDICINE

## 2017-04-20 PROCEDURE — 63710000001 INSULIN ASPART PER 5 UNITS: Performed by: INTERNAL MEDICINE

## 2017-04-20 PROCEDURE — 93017 CV STRESS TEST TRACING ONLY: CPT

## 2017-04-20 PROCEDURE — 78452 HT MUSCLE IMAGE SPECT MULT: CPT

## 2017-04-20 PROCEDURE — 25010000002 REGADENOSON 0.4 MG/5ML SOLUTION: Performed by: INTERNAL MEDICINE

## 2017-04-20 PROCEDURE — A9500 TC99M SESTAMIBI: HCPCS | Performed by: INTERNAL MEDICINE

## 2017-04-20 PROCEDURE — 93010 ELECTROCARDIOGRAM REPORT: CPT | Performed by: INTERNAL MEDICINE

## 2017-04-20 PROCEDURE — 93005 ELECTROCARDIOGRAM TRACING: CPT | Performed by: NURSE PRACTITIONER

## 2017-04-20 RX ADMIN — Medication 1 DOSE: at 08:00

## 2017-04-20 RX ADMIN — FINASTERIDE 5 MG: 5 TABLET, FILM COATED ORAL at 10:54

## 2017-04-20 RX ADMIN — CITALOPRAM 10 MG: 10 TABLET ORAL at 10:54

## 2017-04-20 RX ADMIN — INSULIN ASPART 2 UNITS: 100 INJECTION, SOLUTION INTRAVENOUS; SUBCUTANEOUS at 12:19

## 2017-04-20 RX ADMIN — LEVOTHYROXINE SODIUM 100 MCG: 100 TABLET ORAL at 06:36

## 2017-04-20 RX ADMIN — Medication 1 DOSE: at 06:31

## 2017-04-20 RX ADMIN — REGADENOSON 0.4 MG: 0.08 INJECTION, SOLUTION INTRAVENOUS at 08:00

## 2017-04-20 RX ADMIN — PANTOPRAZOLE SODIUM 40 MG: 40 TABLET, DELAYED RELEASE ORAL at 05:24

## 2017-04-20 NOTE — PROGRESS NOTES
NEPHROLOGY PROGRESS NOTE    PATIENT IDENTIFICATION:   Name:  Haris Sweeney      MRN:  [id number]     87 y.o.  male             Reason for visit: DORA    SUBJECTIVE:   Seen and examined.   No complaint.  No chest pain or shortness of air. Wants to go home. Had stress test earlier.  OBJECTIVE:  Vitals:    04/19/17 1710 04/19/17 2302 04/20/17 0549 04/20/17 1129   BP: 130/77 129/75 121/78 113/67   BP Location:  Right arm Right arm Left arm   Patient Position:  Sitting Lying Lying   Pulse: 85 93 78 71   Resp:  16 16 17   Temp:  98 °F (36.7 °C) 97.3 °F (36.3 °C) 97.8 °F (36.6 °C)   TempSrc:  Oral Oral Oral   SpO2: 98%  98%    Weight:       Height:               Body mass index is 27.81 kg/(m^2).    Intake/Output Summary (Last 24 hours) at 04/20/17 1352  Last data filed at 04/20/17 1129   Gross per 24 hour   Intake              240 ml   Output                0 ml   Net              240 ml         Exam:  GEN:  No distress, appears stated age  EYES:   Anicteric sclera  ENT:    External ears/nose normal, MM are   NECK:  No adenopathy, JVP   LUNGS: Normal chest on inspection; not labored  CV:  Normal S1S2, without murmur  ABD:  Non-tender, non-distended, no hepatosplenomegaly, +BS  EXT:  No edema; no cyanosis; clubbing    Scheduled meds:      citalopram 10 mg Oral Daily   finasteride 5 mg Oral Daily   insulin aspart 0-9 Units Subcutaneous 4x Daily With Meals & Nightly   levothyroxine 100 mcg Oral Q AM   pantoprazole 40 mg Oral Q AM     IV meds:                           Data Review:      Results from last 7 days  Lab Units 04/19/17  1027 04/18/17  1318 04/17/17 2011   SODIUM mmol/L 138 137 133*   POTASSIUM mmol/L 4.9 4.2 5.4*   CHLORIDE mmol/L 104 101 94*   TOTAL CO2 mmol/L 22.7 22.7 26.8   BUN mg/dL 23 38* 49*   CREATININE mg/dL 0.84 1.14 1.51*   CALCIUM mg/dL 8.3* 7.9* 8.9   BILIRUBIN mg/dL  --   --  0.4   ALK PHOS U/L  --   --  65   ALT (SGPT) U/L  --   --  21   AST (SGOT) U/L  --   --  30   GLUCOSE mg/dL 138* 179* 117*        Estimated Creatinine Clearance: 56.9 mL/min (by C-G formula based on Cr of 0.84).      Results from last 7 days  Lab Units 04/18/17  1318   MAGNESIUM mg/dL 2.0         Results from last 7 days  Lab Units 04/19/17  1027 04/17/17 2011   WBC 10*3/mm3 5.59 8.97   HEMOGLOBIN g/dL 12.7* 14.7   PLATELETS 10*3/mm3 160 174         Results from last 7 days  Lab Units 04/18/17  0546 04/17/17 2011   INR  1.19* 1.65*             ASSESSMENT:   Active Problems:    Chronic anticoagulation    Traumatic subarachnoid hemorrhage with loss of consciousness of 30 minutes or less    Syncope and collapse      1.  Acute kidney injury: Likely due to prerenal etiology.  2.  Hyperkalemia: Improved  3. Hyponatremia: Improved . Likely due to volume depletion  4.  Chronic A. Fib and syncopal episode: Further workup for syncopal episode per primary team.  5.  History of bladder cancer  6.  Subarachnoid hemorrhage    Plan:  Volume status is okay  Cont to hold Valsartan to be started as an outpatient  OK to be discharged from renal stand point  Renal follow up in 4-8 weeks  Continue to avoid nephrotoxic medications  Surveillance labs    Deni Salmon MD  4/20/2017    1:52 PM

## 2017-04-20 NOTE — PROGRESS NOTES
Acute Care - Physical Therapy Treatment Note  [facility]     Patient Name: Haris Sweeney  : 1929  MRN: [id number]  Today's Date: 2017  Onset of Illness/Injury or Date of Surgery Date: 17  Date of Referral to PT: 17  Referring Physician: Yefri    Admit Date: 2017    Visit Dx:    ICD-10-CM ICD-9-CM   1. Syncope and collapse R55 780.2   2. Traumatic subarachnoid hemorrhage, with loss of consciousness of 30 minutes or less, initial encounter S06.6X1A 852.02   3. Renal insufficiency N28.9 593.9   4. Generalized weakness R53.1 780.79     Patient Active Problem List   Diagnosis   • Non-insulin dependent type 2 diabetes mellitus   • Generalized osteoarthritis of multiple sites   • Depression   • A-fib   • HTN (hypertension)   • Hypothyroidism   • Chronic gout   • GERD (gastroesophageal reflux disease)   • Encounter for prostate cancer screening   • Bilateral carpal tunnel syndrome   • Chronic anticoagulation   • Paresthesia of both hands   • Medicare annual wellness visit, subsequent   • Bilateral hand pain   • Traumatic subarachnoid hemorrhage with loss of consciousness of 30 minutes or less   • Syncope and collapse               Adult Rehabilitation Note       17 1400 17 1558       Rehab Assessment/Intervention    Discipline physical therapy assistant  -RW physical therapist  -EM,CH,EM2     Document Type therapy note (daily note)  - therapy note (daily note)  -EM,CH,EM2     Subjective Information agree to therapy  -RW agree to therapy;no complaints  -EM,CH,EM2     Patient Effort, Rehab Treatment good  -RW good  -EM,CH,EM2     Symptoms Noted During/After Treatment  none  -EM,CH,EM2     Precautions/Limitations fall precautions  -RW fall precautions  -EM,CH,EM2     Recorded by [RW] Roxi Miller PTA [EM,CH,EM2] Margaret Velasquez, PT (r) Yordan Ward, WHITNEY Student (t) Margaret Velasquez, PT (c)     Vital Signs    Posttreatment Heart Rate (beats/min)  94  -EM,CH,EM2      Post SpO2 (%)  99  -EM,CH,EM2     O2 Delivery Post Treatment  room air  -EM,CH,EM2     Recorded by  [EM,CH,EM2] Margaret Velasquez, PT (r) Yordan Ward PT Student (t) Margaret Velasquez PT (c)     Pain Assessment    Pain Assessment 0-10  -RW No/denies pain  -EM,CH,EM2     Pain Score 4  -RW      Pain Type Acute pain  -RW      Pain Location Back  -RW      Pain Intervention(s) Repositioned;Ambulation/increased activity  -RW      Response to Interventions tolerated  -RW      Recorded by [RW] Roxi Miller PTA [EM,CH,EM2] Margaret Velasquez, PT (r) Yordan Ward, PT Student (t) Margaret Velasquez PT (c)     Cognitive Assessment/Intervention    Current Cognitive/Communication Assessment functional  -RW functional  -EM,CH,EM2     Orientation Status oriented x 4  -RW oriented x 4  -EM,CH,EM2     Follows Commands/Answers Questions 100% of the time  -% of the time;able to follow single-step instructions  -EM,CH,EM2     Personal Safety WNL/WFL  -RW WNL/WFL  -EM,CH,EM2     Personal Safety Interventions fall prevention program maintained;nonskid shoes/slippers when out of bed  -RW fall prevention program maintained;gait belt;nonskid shoes/slippers when out of bed  -EM,CH,EM2     Recorded by [RW] Roxi Miller PTA [EM,CH,EM2] Margaret Velasquez, PT (r) Yordan Ward, PT Student (t) Margaret Velasquez PT (c)     Bed Mobility, Assessment/Treatment    Bed Mobility, Assistive Device head of bed elevated  -RW      Bed Mob, Supine to Sit, Briggs verbal cues required;minimum assist (75% patient effort)   HHA  -RW not tested  -EM,CH,EM2     Bed Mob, Sit to Supine, Briggs not tested   Pt up in chair  -RW not tested  -EM,CH,EM2     Bed Mobility, Comment  up in chair  -EM,CH,EM2     Recorded by [RW] Roxi Miller PTA [EM,CH,EM2] Margaret Velasquez, PT (r) Yordan Ward, PT Student (t) Margaret Velasquez, PT (c)     Transfer Assessment/Treatment    Transfers, Sit-Stand Briggs supervision  required  -RW supervision required  -EM,CH,EM2     Transfers, Stand-Sit [place] supervision required  -RW supervision required  -EM,CH,EM2     Recorded by [RW] Roxi Miller PTA [EM,CH,EM2] Margaret Velasquez, PT (r) Yordan Ward PT Student (t) Margaret Velasquez PT (c)     Gait Assessment/Treatment    Gait, [place] Level verbal cues required;stand by assist;supervision required  -RW stand by assist  -EM,CH,EM2     Gait, Assistive Device  rolling walker  -EM,CH,EM2     Gait, Distance (Feet) 180  -  -EM,CH,EM2     Gait, Gait Deviations forward flexed posture;bilateral:;paul decreased;narrow base;step length decreased  -RW      Gait, Safety Issues balance decreased during turns;step length decreased  -RW      Gait, Comment Declined further ambulation due to fatigue and pain  -RW 2 laps with rolling walker and socks, seated rest break, 1 lap with shoes and no AD  -EM,CH,EM2     Recorded by [RW] Roxi Miller PTA [EM,CH,EM2] Margaret Velasquez, PT (r) Yordan Ward PT Student (t) Margaret Velasquez PT (c)     Positioning and Restraints    Pre-Treatment Position in bed  -RW sitting in chair/recliner  -EM,CH,EM2     Post Treatment Position chair  -RW chair  -EM,CH,EM2     In Chair sitting;encouraged to call for assist;with family/caregiver;notified ns  -RW reclined;call light within reach;encouraged to call for assist;with family/caregiver  -EM,CH,EM2     Recorded by [RW] Roxi Miller PTA [EM,CH,EM2] Margaret Velasquez, PT (r) Yordan Ward PT Student (t) Margaret Velasquez PT (c)       User Key  (r) = Recorded By, (t) = Taken By, (c) = Cosigned By    Initials Name Effective Dates    EM Margaret Velasquez, PT 12/01/15 -     RW Roxi Miller PTA 04/06/16 -     KWABENA Ward PT Student 01/31/17 -                 IP PT Goals       04/18/17 1545          Bed Mobility PT LTG    Bed Mobility PT LTG, Date Established 04/18/17  -PC (r) CH (t) PC (c)      Bed Mobility PT LTG,  Time to Achieve 1 wk  -PC (r) CH (t) PC (c)      Bed Mobility PT LTG, Activity Type all bed mobility  -PC (r) CH (t) PC (c)      Bed Mobility PT LTG, [place] Level supervision required  -PC (r) CH (t) PC (c)      Transfer Training PT LTG    Transfer Training PT LTG, Date Established 04/18/17  -PC (r) CH (t) PC (c)      Transfer Training PT LTG, Time to Achieve 1 wk  -PC (r) CH (t) PC (c)      Transfer Training PT LTG, Activity Type all transfers  -PC (r) CH (t) PC (c)      Transfer Training PT LTG, [place] Level supervision required  -PC (r) CH (t) PC (c)      Gait Training PT LTG    Gait Training Goal PT LTG, Date Established 04/18/17  -PC (r) CH (t) PC (c)      Gait Training Goal PT LTG, Time to Achieve 1 wk  -PC (r) CH (t) PC (c)      Gait Training Goal PT LTG, [place] Level supervision required  -PC (r) CH (t) PC (c)      Gait Training Goal PT LTG, Distance to Achieve 150  -PC (r) CH (t) PC (c)      Gait Training Goal PT LTG, Additional Goal no unsteadiness w/o AD  -PC (r) CH (t) PC (c)        User Key  (r) = Recorded By, (t) = Taken By, (c) = Cosigned By    Initials Name Provider Type    PC Krystal Barber, PT Physical Therapist    KWABENA Ward, PT Student PT Student          Physical Therapy Education     Title: PT OT SLP Therapies (Active)     Topic: Physical Therapy (Active)     Point: Mobility training (Done)    Learning Progress Summary    Learner Readiness Method Response Comment Documented by Status   Patient Acceptance E VU   04/20/17 1525 Done    Acceptance E VU,DU   04/19/17 1603 Done    Acceptance E VU   04/18/17 1545 Done   Family Acceptance E VU   04/20/17 1525 Done    Acceptance E VU,DU   04/19/17 1603 Done                      User Key     Initials Effective Dates Name Provider Type Discipline     04/06/16 -  Roxi Miller, PTA Physical Therapy Assistant PT     01/31/17 -  Yordan Ward, PT Student PT Student PT                    PT Recommendation and  Plan  Anticipated Discharge Disposition: home with assist  Planned Therapy Interventions: bed mobility training, gait training, strengthening, transfer training  PT Frequency: daily  Plan of Care Review  Plan Of Care Reviewed With: patient  Outcome Summary/Follow up Plan: Pt increased ambulation distance. Did not require use of AD for mobility today. Slightly unsteady w/ turns, but no LOB noted.          Outcome Measures       04/20/17 1400 04/19/17 1600 04/18/17 1500    How much help from another person do you currently need...    Turning from your back to your side while in flat bed without using bedrails? 4  -RW 4  -EM (r) CH (t) EM (c) 4  -PC (r) CH (t) PC (c)    Moving from lying on back to sitting on the side of a flat bed without bedrails? 3  -RW 3  -EM (r) CH (t) EM (c) 3  -PC (r) CH (t) PC (c)    Moving to and from a bed to a chair (including a wheelchair)? 4  -RW 4  -EM (r) CH (t) EM (c) 3  -PC (r) CH (t) PC (c)    Standing up from a chair using your arms (e.g., wheelchair, bedside chair)? 4  -RW 4  -EM (r) CH (t) EM (c) 3  -PC (r) CH (t) PC (c)    Climbing 3-5 steps with a railing? 3  -RW 3  -EM (r) CH (t) EM (c) 3  -PC (r) CH (t) PC (c)    To walk in hospital room? 3  -RW 3  -EM (r) CH (t) EM (c) 3  -PC (r) CH (t) PC (c)    AM-PAC 6 Clicks Score 21  -RW 21  -EM (r) CH (t) 19  -PC (r) CH (t)    Functional Assessment    Outcome Measure Options AM-PAC 6 Clicks Basic Mobility (PT)  -RW AM-PAC 6 Clicks Basic Mobility (PT)  -EM (r) CH (t) EM (c) AM-PAC 6 Clicks Basic Mobility (PT)  -PC (r) CH (t) PC (c)      User Key  (r) = Recorded By, (t) = Taken By, (c) = Cosigned By    Initials Name Provider Type    PC Krystal Barber, PT Physical Therapist    EM Margaret Velasquez, PT Physical Therapist    RW Roxi Miller, PTA Physical Therapy Assistant    KWABENA Ward, PT Student PT Student           Time Calculation:         PT Charges       04/20/17 1523 04/20/17 1042       Time Calculation    Start Time 1438   -RW      Stop Time 1448  -RW      Time Calculation (min) 10 min  -RW      PT Received On 04/20/17  -RW      PT - Next Appointment 04/21/17  -RW 04/20/17  -       User Key  (r) = Recorded By, (t) = Taken By, (c) = Cosigned By    Initials Name Provider Type     Roxi Miller PTA Physical Therapy Assistant          Therapy Charges for Today     Code Description Service Date Service Provider Modifiers Qty    [id number] HC PT THER PROC EA 15 MIN 4/20/2017 Roxi Miller PTA GP 1          PT G-Codes  Outcome Measure Options: AM-PAC 6 Clicks Basic Mobility (PT)    Roxi Miller PTA  4/20/2017

## 2017-04-20 NOTE — PLAN OF CARE
Problem: Patient Care Overview (Adult)  Goal: Plan of Care Review  Outcome: Ongoing (interventions implemented as appropriate)    04/20/17 0543   Coping/Psychosocial Response Interventions   Plan Of Care Reviewed With patient   Patient Care Overview   Progress no change       Goal: Adult Individualization and Mutuality  Outcome: Ongoing (interventions implemented as appropriate)  Goal: Discharge Needs Assessment  Outcome: Ongoing (interventions implemented as appropriate)    Problem: Fall Risk (Adult)  Goal: Absence of Falls  Outcome: Ongoing (interventions implemented as appropriate)

## 2017-04-20 NOTE — PLAN OF CARE
Problem: Patient Care Overview (Adult)  Goal: Plan of Care Review  Outcome: Ongoing (interventions implemented as appropriate)    04/20/17 1525   Coping/Psychosocial Response Interventions   Plan Of Care Reviewed With patient   Outcome Evaluation   Outcome Summary/Follow up Plan Pt increased ambulation distance. Did not require use of AD for mobility today. Slightly unsteady w/ turns, but no LOB noted.

## 2017-04-20 NOTE — NURSING NOTE
Pt and daughter unhappy at wait for room to be cleaned, keeping pt updated. Pt states he wants to be moved so his son and wife can bring them food. Calls placed by nurse and charge nurse to keep updated on room . 2045, room clean and await transport

## 2017-04-20 NOTE — SIGNIFICANT NOTE
04/20/17 1042   Rehab Treatment   Discipline physical therapy assistant   Treatment Not Performed patient/family declined treatment  (Pt asleep. Family asked for PT to come back later. Pt had stress test this am. )   Recommendation   PT - Next Appointment 04/20/17

## 2017-04-20 NOTE — DISCHARGE SUMMARY
PHYSICIAN DISCHARGE SUMMARY                                                                        [facility]    Patient Identification:  Name: Haris Sweeney  Age: 87 y.o.  Sex: male  :  1929  MRN: [id number]  Primary Care Physician: Srikanth Madsen MD    Admit date: 2017  Discharge date and time: No discharge date for patient encounter.   Discharged Condition: stable    Discharge Diagnoses:Active Problems:    Chronic anticoagulation    Traumatic subarachnoid hemorrhage with loss of consciousness of 30 minutes or less    Syncope and collapse       Hospital Course: Haris Sweeney presented to [facility]    Admitted by Dr. Keyes with syncope status post fall patient history of atrial fibrillation on anticoagulation Coumadin.  CT head showed intracranial hemorrhage.  Patient received a Centra and vitamin K for Coumadin reversal.  He is hypotension was felt to be multifactorial with dehydration etc.  We consulted cardiology and currently the stress test is pending.  He's been cleared to be discharged from neurosurgery and nephrology.  Neurosurgery saw him initially on 17 and recommended starting Coumadin in 3 days.  Patient is stable ambulating by himself.  He will be discharged home in a stable condition.  He will restart Coumadin at home and at home dose if okay with cardiology.  He still awaiting cardiology approval prior to discharge.  Consults:   IP CONSULT TO PULMONOLOGY  IP CONSULT TO NEUROSURGERY  IP CONSULT TO CASE MANAGEMENT   IP CONSULT TO NEUROSURGERY  IP CONSULT TO CARDIOLOGY  IP CONSULT TO NEPHROLOGY  IP CONSULT TO CARDIOLOGY    Significant Diagnostic Studies:   [unfilled]    Discharge Exam:  Alert and oriented x 4, in NAD  Supple neck, midline trach  RRR, no m/r/g, no edema  Clear bilaterally, no wheezing, nonlabored  No clubbing or cyanosis      Disposition:  Home    Patient Instructions:   [unfilled]    [unfilled]     Medication Reconciliation: Please see electronically completed Med Rec.    Total time spent discharging patient including evaluation, medication reconciliation, arranging follow up, and post hospitalization instructions and education total time exceeds 30 minutes.    Signed:  Primo Salguero MD  4/20/2017  4:43 PM

## 2017-04-20 NOTE — PLAN OF CARE
Problem: Patient Care Overview (Adult)  Goal: Plan of Care Review  Outcome: Ongoing (interventions implemented as appropriate)    04/20/17 0547   Coping/Psychosocial Response Interventions   Plan Of Care Reviewed With patient       Goal: Discharge Needs Assessment  Outcome: Ongoing (interventions implemented as appropriate)

## 2017-04-21 NOTE — THERAPY DISCHARGE NOTE
Acute Care - Physical Therapy Treatment Note/Discharge  [facility]     Patient Name: Haris Sweeney  : 1929  MRN: [id number]  Today's Date: 2017  Onset of Illness/Injury or Date of Surgery Date: 17  Date of Referral to PT: 17  Referring Physician: Yefri    Admit Date: 2017    Visit Dx:    ICD-10-CM ICD-9-CM   1. Syncope and collapse R55 780.2   2. Traumatic subarachnoid hemorrhage, with loss of consciousness of 30 minutes or less, initial encounter S06.6X1A 852.02   3. Renal insufficiency N28.9 593.9   4. Generalized weakness R53.1 780.79     Patient Active Problem List   Diagnosis   • Non-insulin dependent type 2 diabetes mellitus   • Generalized osteoarthritis of multiple sites   • Depression   • A-fib   • HTN (hypertension)   • Hypothyroidism   • Chronic gout   • GERD (gastroesophageal reflux disease)   • Encounter for prostate cancer screening   • Bilateral carpal tunnel syndrome   • Chronic anticoagulation   • Paresthesia of both hands   • Medicare annual wellness visit, subsequent   • Bilateral hand pain   • Traumatic subarachnoid hemorrhage with loss of consciousness of 30 minutes or less   • Syncope and collapse       Physical Therapy Education     Title: PT OT SLP Therapies (Resolved)     Topic: Physical Therapy (Resolved)     Point: Mobility training (Resolved)    Learning Progress Summary    Learner Readiness Method Response Comment Documented by Status   Patient Acceptance E VU   17 1525 Done    Acceptance E VU,DU   17 1603 Done    Acceptance E VU   17 1545 Done   Family Acceptance E VU   17 1525 Done    Acceptance E VU,DU   17 1603 Done                      User Key     Initials Effective Dates Name Provider Type Discipline     16 -  Roxi Miller PTA Physical Therapy Assistant PT     17 -  Yordan Ward PT Student PT Student PT                    IP PT Goals       17 0757 17 1545       Bed  Mobility PT LTG    Bed Mobility PT LTG, Date Established  04/18/17  -PC (r) CH (t) PC (c)     Bed Mobility PT LTG, Time to Achieve  1 wk  -PC (r) CH (t) PC (c)     Bed Mobility PT LTG, Activity Type all bed mobility  -RW all bed mobility  -PC (r) CH (t) PC (c)     Bed Mobility PT LTG, [place] Level minimum assist (75% patient effort)  -RW supervision required  -PC (r) CH (t) PC (c)     Bed Mobility PT LTG, Outcome goal not met  -RW      Bed Mobility PT LTG, Reason Goal Not Met discharged from facility  -RW      Transfer Training PT LTG    Transfer Training PT LTG, Date Established  04/18/17  -PC (r) CH (t) PC (c)     Transfer Training PT LTG, Time to Achieve  1 wk  -PC (r) CH (t) PC (c)     Transfer Training PT LTG, Activity Type all transfers  -RW all transfers  -PC (r) CH (t) PC (c)     Transfer Training PT LTG, [place] Level supervision required  -RW supervision required  -PC (r) CH (t) PC (c)     Transfer Training PT LTG, Outcome goal met  -RW      Gait Training PT LTG    Gait Training Goal PT LTG, Date Established  04/18/17  -PC (r) CH (t) PC (c)     Gait Training Goal PT LTG, Time to Achieve  1 wk  -PC (r) CH (t) PC (c)     Gait Training Goal PT LTG, [place] Level supervision required  -RW supervision required  -PC (r) CH (t) PC (c)     Gait Training Goal PT LTG, Distance to Achieve 180  -  -PC (r) CH (t) PC (c)     Gait Training Goal PT LTG, Additional Goal  no unsteadiness w/o AD  -PC (r) CH (t) PC (c)     Gait Training Goal PT LTG, Outcome goal met  -RW        User Key  (r) = Recorded By, (t) = Taken By, (c) = Cosigned By    Initials Name Provider Type    AVERY Barber, PT Physical Therapist    RW Roxi Miller, PTA Physical Therapy Assistant    CH Yordan Ward, PT Student PT Student              Adult Rehabilitation Note       04/20/17 1400 04/19/17 1558       Rehab Assessment/Intervention    Discipline physical therapy assistant  -RW physical therapist  -EM,CH,EM2     Document  Type therapy note (daily note)  -RW therapy note (daily note)  -EM,CH,EM2     Subjective Information agree to therapy  -RW agree to therapy;no complaints  -EM,CH,EM2     Patient Effort, Rehab Treatment good  -RW good  -EM,CH,EM2     Symptoms Noted During/After Treatment  none  -EM,CH,EM2     Precautions/Limitations fall precautions  -RW fall precautions  -EM,CH,EM2     Recorded by [RW] Roxi Miller PTA [EM,CH,EM2] Margaret Velasquez, PT (r) Yordan Ward PT Student (t) Margaret Velasquez PT (c)     Vital Signs    Posttreatment Heart Rate (beats/min)  94  -EM,CH,EM2     Post SpO2 (%)  99  -EM,CH,EM2     O2 Delivery Post Treatment  room air  -EM,CH,EM2     Recorded by  [EM,CH,EM2] Margaret Velasquez, PT (r) Yordan Ward PT Student (t) Margaret Velasquez PT (c)     Pain Assessment    Pain Assessment 0-10  -RW No/denies pain  -EM,CH,EM2     Pain Score 4  -RW      Pain Type Acute pain  -RW      Pain Location Back  -RW      Pain Intervention(s) Repositioned;Ambulation/increased activity  -RW      Response to Interventions tolerated  -RW      Recorded by [RW] Roxi Miller PTA [EM,CH,EM2] Margaret Velasquez, PT (r) Yordan Ward PT Student (t) Margaret Velasquez PT (c)     Cognitive Assessment/Intervention    Current Cognitive/Communication Assessment functional  -RW functional  -EM,CH,EM2     Orientation Status oriented x 4  -RW oriented x 4  -EM,CH,EM2     Follows Commands/Answers Questions 100% of the time  -% of the time;able to follow single-step instructions  -EM,CH,EM2     Personal Safety WNL/WFL  -RW WNL/WFL  -EM,CH,EM2     Personal Safety Interventions fall prevention program maintained;nonskid shoes/slippers when out of bed  -RW fall prevention program maintained;gait belt;nonskid shoes/slippers when out of bed  -EM,CH,EM2     Recorded by [RW] Roxi Miller PTA [EM,CH,EM2] Margaret Velasquez, PT (r) Yordan Ward, PT Student (t) Margaret Velasquez, PT (c)     Bed Mobility,  Assessment/Treatment    Bed Mobility, Assistive Device head of bed elevated  -RW      Bed Mob, Supine to Sit, [place] verbal cues required;minimum assist (75% patient effort)   HHA  -RW not tested  -EM,CH,EM2     Bed Mob, Sit to Supine, [place] not tested   Pt up in chair  -RW not tested  -EM,CH,EM2     Bed Mobility, Comment  up in chair  -EM,CH,EM2     Recorded by [RW] Roxi Miller PTA [EM,CH,EM2] Margaert Velasquez, PT (r) Yordan Ward PT Student (t) Margaret Velasquez PT (c)     Transfer Assessment/Treatment    Transfers, Sit-Stand [place] supervision required  -RW supervision required  -EM,CH,EM2     Transfers, Stand-Sit [place] supervision required  -RW supervision required  -EM,CH,EM2     Recorded by [RW] Roxi Miller PTA [EM,CH,EM2] Margaret Velasquez, PT (r) Yordan Ward PT Student (t) Margaret Velasquez PT (c)     Gait Assessment/Treatment    Gait, [place] Level verbal cues required;stand by assist;supervision required  -RW stand by assist  -EM,CH,EM2     Gait, Assistive Device  rolling walker  -EM,CH,EM2     Gait, Distance (Feet) 180  -  -EM,CH,EM2     Gait, Gait Deviations forward flexed posture;bilateral:;paul decreased;narrow base;step length decreased  -RW      Gait, Safety Issues balance decreased during turns;step length decreased  -RW      Gait, Comment Declined further ambulation due to fatigue and pain  -RW 2 laps with rolling walker and socks, seated rest break, 1 lap with shoes and no AD  -EM,CH,EM2     Recorded by [RW] Roxi Miller PTA [EM,CH,EM2] Margaret Velasquez, PT (r) Yordan Ward, PT Student (t) Margaret Velasquez PT (c)     Positioning and Restraints    Pre-Treatment Position in bed  -RW sitting in chair/recliner  -EM,CH,EM2     Post Treatment Position chair  -RW chair  -EM,CH,EM2     In Chair sitting;encouraged to call for assist;with family/caregiver;notified nsg  -RW reclined;call light within reach;encouraged to call for  assist;with family/caregiver  -EM,CH,EM2     Recorded by [RW] Roxi Miller PTA [EM,CH,EM2] Margaret Velasquez, PT (r) Yordan Ward, PT Student (t) Margaret Velasquez, PT (c)       User Key  (r) = Recorded By, (t) = Taken By, (c) = Cosigned By    Initials Name Effective Dates    EM Margaret Velasquez, PT 12/01/15 -     BRIANA Miller PTA 04/06/16 -     KWABENA Ward, PT Student 01/31/17 -           PT Recommendation and Plan  Anticipated Discharge Disposition: home with assist  Planned Therapy Interventions: bed mobility training, gait training, strengthening, transfer training  PT Frequency: daily  Plan of Care Review  Plan Of Care Reviewed With: patient  Outcome Summary/Follow up Plan: Pt increased ambulation distance. Did not require use of AD for mobility today. Slightly unsteady w/ turns, but no LOB noted.          Outcome Measures       04/20/17 1400 04/19/17 1600 04/18/17 1500    How much help from another person do you currently need...    Turning from your back to your side while in flat bed without using bedrails? 4  -RW 4  -EM (r) CH (t) EM (c) 4  -PC (r) CH (t) PC (c)    Moving from lying on back to sitting on the side of a flat bed without bedrails? 3  -RW 3  -EM (r) CH (t) EM (c) 3  -PC (r) CH (t) PC (c)    Moving to and from a bed to a chair (including a wheelchair)? 4  -RW 4  -EM (r) CH (t) EM (c) 3  -PC (r) CH (t) PC (c)    Standing up from a chair using your arms (e.g., wheelchair, bedside chair)? 4  -RW 4  -EM (r) CH (t) EM (c) 3  -PC (r) CH (t) PC (c)    Climbing 3-5 steps with a railing? 3  -RW 3  -EM (r) CH (t) EM (c) 3  -PC (r) CH (t) PC (c)    To walk in hospital room? 3  -RW 3  -EM (r) CH (t) EM (c) 3  -PC (r) CH (t) PC (c)    AM-PAC 6 Clicks Score 21  -RW 21  -EM (r) CH (t) 19  -PC (r) CH (t)    Functional Assessment    Outcome Measure Options AM-PAC 6 Clicks Basic Mobility (PT)  -RW AM-PAC 6 Clicks Basic Mobility (PT)  -EM (r) CH (t) EM (c) AM-PAC 6 Clicks Basic Mobility  (PT)  -PC (r) CH (t) PC (c)      User Key  (r) = Recorded By, (t) = Taken By, (c) = Cosigned By    Initials Name Provider Type    PC Krystal Barber, PT Physical Therapist    EM Margaret Velasquez, PT Physical Therapist    RW Roxi Miller, PTA Physical Therapy Assistant    CH Yordan Ward, PT Student PT Student           Time Calculation:       Therapy Charges for Today     Code Description Service Date Service Provider Modifiers Qty    70324050163 HC PT THER PROC EA 15 MIN 4/20/2017 Roxi Miller PTA GP 1          PT G-Codes  Outcome Measure Options: AM-PAC 6 Clicks Basic Mobility (PT)    PT Discharge Summary  Anticipated Discharge Disposition: home with assist  Reason for Discharge: Discharge from facility  Outcomes Achieved: Refer to plan of care for updates on goals achieved  Discharge Destination: Home with assist    Roxi Miller PTA  4/21/2017

## 2017-04-21 NOTE — PLAN OF CARE
Problem: Inpatient Physical Therapy  Goal: Bed Mobility Goal LTG- PT  Outcome: Unable to achieve outcome(s) by discharge Date Met:  04/21/17 04/21/17 0757   Bed Mobility PT LTG   Bed Mobility PT LTG, Activity Type all bed mobility   Bed Mobility PT LTG, [place] Level minimum assist (75% patient effort)   Bed Mobility PT LTG, Outcome goal not met   Bed Mobility PT LTG, Reason Goal Not Met discharged from facility       Goal: Transfer Training Goal 1 LTG- PT  Outcome: Outcome(s) achieved Date Met:  04/21/17 04/21/17 0757   Transfer Training PT LTG   Transfer Training PT LTG, Activity Type all transfers   Transfer Training PT LTG, [place] Level supervision required   Transfer Training PT LTG, Outcome goal met       Goal: Gait Training Goal LTG- PT  Outcome: Outcome(s) achieved Date Met:  04/21/17 04/21/17 0757   Gait Training PT LTG   Gait Training Goal PT LTG, [place] Level supervision required   Gait Training Goal PT LTG, Distance to Achieve 180   Gait Training Goal PT LTG, Outcome goal met

## 2017-04-22 PROCEDURE — 93227 XTRNL ECG REC<48 HR R&I: CPT | Performed by: INTERNAL MEDICINE

## 2017-04-23 RX ORDER — VALSARTAN 40 MG/1
TABLET ORAL
Qty: 90 TABLET | Refills: 1 | Status: SHIPPED | OUTPATIENT
Start: 2017-04-23 | End: 2017-06-01

## 2017-04-24 RX ORDER — CITALOPRAM 10 MG/1
TABLET ORAL
Qty: 90 TABLET | Refills: 2 | Status: SHIPPED | OUTPATIENT
Start: 2017-04-24 | End: 2018-01-11 | Stop reason: SDUPTHER

## 2017-04-26 ENCOUNTER — TELEPHONE (OUTPATIENT)
Dept: INTERNAL MEDICINE | Facility: CLINIC | Age: 82
End: 2017-04-26

## 2017-04-26 NOTE — TELEPHONE ENCOUNTER
Pt called and said he was discharged from the hospital and he has an appointment with Dr. Madsen on May 3rd. He is having swelling in both feet and ankles. I spoke with the pt and he said he thought the hospital discontinued his lasix but it was a misunderstanding so he went back on it and will speak with Dr. Madsen at his appointment next week.

## 2017-05-01 ENCOUNTER — OFFICE VISIT (OUTPATIENT)
Dept: INTERNAL MEDICINE | Facility: CLINIC | Age: 82
End: 2017-05-01

## 2017-05-01 VITALS
OXYGEN SATURATION: 95 % | DIASTOLIC BLOOD PRESSURE: 64 MMHG | TEMPERATURE: 98.8 F | HEART RATE: 94 BPM | SYSTOLIC BLOOD PRESSURE: 122 MMHG | WEIGHT: 158 LBS | BODY MASS INDEX: 26.98 KG/M2 | HEIGHT: 64 IN

## 2017-05-01 DIAGNOSIS — E03.8 OTHER SPECIFIED HYPOTHYROIDISM: ICD-10-CM
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
## 2017-05-01 DIAGNOSIS — E11.9 NON-INSULIN DEPENDENT TYPE 2 DIABETES MELLITUS (HCC): ICD-10-CM

## 2017-05-01 DIAGNOSIS — S06.6X1A TRAUMATIC SUBARACHNOID HEMORRHAGE WITH LOSS OF CONSCIOUSNESS OF 30 MINUTES OR LESS, INITIAL ENCOUNTER (HCC): ICD-10-CM

## 2017-05-01 DIAGNOSIS — R55 SYNCOPE AND COLLAPSE: ICD-10-CM

## 2017-05-01 DIAGNOSIS — I10 ESSENTIAL HYPERTENSION: ICD-10-CM

## 2017-05-01 DIAGNOSIS — Z09 HOSPITAL DISCHARGE FOLLOW-UP: ICD-10-CM
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
## 2017-05-01 DIAGNOSIS — I48.0 PAROXYSMAL ATRIAL FIBRILLATION (HCC): ICD-10-CM

## 2017-05-01 DIAGNOSIS — Z79.01 CHRONIC ANTICOAGULATION: ICD-10-CM

## 2017-05-01 DIAGNOSIS — M15.9 GENERALIZED OSTEOARTHRITIS OF MULTIPLE SITES: ICD-10-CM

## 2017-05-01 PROBLEM — I50.42 CHRONIC COMBINED SYSTOLIC AND DIASTOLIC CONGESTIVE HEART FAILURE (HCC): Status: ACTIVE | Noted: 2017-05-01

## 2017-05-01 PROCEDURE — 99214 OFFICE O/P EST MOD 30 MIN: CPT | Performed by: INTERNAL MEDICINE

## 2017-05-01 RX ORDER — GLIMEPIRIDE 1 MG/1
TABLET ORAL
Qty: 90 TABLET | Refills: 1 | Status: SHIPPED | OUTPATIENT
Start: 2017-05-01 | End: 2017-11-04 | Stop reason: SDUPTHER

## 2017-05-02 LAB
ALBUMIN SERPL-MCNC: 3.2 G/DL (ref 3.5–5.2)
ALBUMIN/GLOB SERPL: 0.9 G/DL
ALP SERPL-CCNC: 80 U/L (ref 39–117)
ALT SERPL-CCNC: 26 U/L (ref 1–41)
AST SERPL-CCNC: 38 U/L (ref 1–40)
BILIRUB SERPL-MCNC: 1.1 MG/DL (ref 0.1–1.2)
BUN SERPL-MCNC: 21 MG/DL (ref 8–23)
BUN/CREAT SERPL: 23.1 (ref 7–25)
CALCIUM SERPL-MCNC: 9.1 MG/DL (ref 8.6–10.5)
CHLORIDE SERPL-SCNC: 90 MMOL/L (ref 98–107)
CO2 SERPL-SCNC: 26.9 MMOL/L (ref 22–29)
CREAT SERPL-MCNC: 0.91 MG/DL (ref 0.76–1.27)
GLOBULIN SER CALC-MCNC: 3.7 GM/DL
GLUCOSE SERPL-MCNC: 163 MG/DL (ref 65–99)
POTASSIUM SERPL-SCNC: 4.2 MMOL/L (ref 3.5–5.2)
PROT SERPL-MCNC: 6.9 G/DL (ref 6–8.5)
SODIUM SERPL-SCNC: 132 MMOL/L (ref 136–145)

## 2017-05-22 ENCOUNTER — OFFICE VISIT (OUTPATIENT)
Dept: CARDIOLOGY | Facility: CLINIC | Age: 82
End: 2017-05-22

## 2017-05-22 VITALS
DIASTOLIC BLOOD PRESSURE: 75 MMHG | SYSTOLIC BLOOD PRESSURE: 126 MMHG | HEART RATE: 123 BPM | WEIGHT: 153 LBS | BODY MASS INDEX: 26.26 KG/M2

## 2017-05-22 DIAGNOSIS — R55 SYNCOPE AND COLLAPSE: ICD-10-CM

## 2017-05-22 DIAGNOSIS — Z79.01 CHRONIC ANTICOAGULATION: ICD-10-CM

## 2017-05-22 DIAGNOSIS — I48.19 PERSISTENT ATRIAL FIBRILLATION (HCC): Primary | ICD-10-CM

## 2017-05-22 PROCEDURE — 99213 OFFICE O/P EST LOW 20 MIN: CPT | Performed by: INTERNAL MEDICINE

## 2017-05-22 PROCEDURE — 93000 ELECTROCARDIOGRAM COMPLETE: CPT | Performed by: INTERNAL MEDICINE

## 2017-05-22 RX ORDER — FUROSEMIDE 20 MG/1
TABLET ORAL
Refills: 5 | COMMUNITY
Start: 2017-05-11 | End: 2019-03-18 | Stop reason: SDUPTHER

## 2017-05-24 RX ORDER — FLUTICASONE PROPIONATE 50 MCG
SPRAY, SUSPENSION (ML) NASAL
Qty: 16 ML | Refills: 0 | Status: SHIPPED | OUTPATIENT
Start: 2017-05-24 | End: 2017-07-04 | Stop reason: SDUPTHER

## 2017-06-01 ENCOUNTER — OFFICE VISIT (OUTPATIENT)
Dept: INTERNAL MEDICINE | Facility: CLINIC | Age: 82
End: 2017-06-01

## 2017-06-01 VITALS
WEIGHT: 150 LBS | HEART RATE: 100 BPM | SYSTOLIC BLOOD PRESSURE: 111 MMHG | HEIGHT: 64 IN | DIASTOLIC BLOOD PRESSURE: 65 MMHG | OXYGEN SATURATION: 97 % | TEMPERATURE: 96.4 F | BODY MASS INDEX: 25.61 KG/M2

## 2017-06-01 DIAGNOSIS — I95.1 ORTHOSTATIC HYPOTENSION: Primary | ICD-10-CM

## 2017-06-01 DIAGNOSIS — Z79.01 CHRONIC ANTICOAGULATION: ICD-10-CM

## 2017-06-01 DIAGNOSIS — E11.9 NON-INSULIN DEPENDENT TYPE 2 DIABETES MELLITUS (HCC): ICD-10-CM

## 2017-06-01 DIAGNOSIS — I50.42 CHRONIC COMBINED SYSTOLIC AND DIASTOLIC CONGESTIVE HEART FAILURE (HCC): ICD-10-CM

## 2017-06-01 PROBLEM — R55 SYNCOPE AND COLLAPSE: Status: RESOLVED | Noted: 2017-04-17 | Resolved: 2017-06-01

## 2017-06-01 PROCEDURE — 99214 OFFICE O/P EST MOD 30 MIN: CPT | Performed by: INTERNAL MEDICINE

## 2017-06-01 RX ORDER — FLUDROCORTISONE ACETATE 0.1 MG/1
0.1 TABLET ORAL DAILY
Qty: 90 TABLET | Refills: 1 | Status: SHIPPED | OUTPATIENT
Start: 2017-06-01 | End: 2017-08-02

## 2017-06-01 RX ORDER — WARFARIN SODIUM 2.5 MG/1
TABLET ORAL
COMMUNITY
Start: 2017-05-25 | End: 2017-12-05 | Stop reason: SDUPTHER

## 2017-06-11 NOTE — PROGRESS NOTES
Subjective   Haris Sweeney is a 87 y.o. male.   He is here today for orthostatic hypotension along with chronic combined systolic and diastolic heart failure non-insulin-dependent type 2 diabetes and chronic anticoagulation  History of Present Illness   He is here today for orthostatic hypotension along with chronic combined systolic and diastolic heart failure as well as non-insulin-dependent type 2 diabetes and chronic anticoagulation  The following portions of the patient's history were reviewed and updated as appropriate: allergies, current medications, past family history, past medical history, past social history, past surgical history and problem list.    Review of Systems   Neurological: Positive for light-headedness.   All other systems reviewed and are negative.      Objective   Physical Exam   Constitutional: He is oriented to person, place, and time. He appears well-developed and well-nourished. He is cooperative.   HENT:   Head: Normocephalic and atraumatic.   Right Ear: Hearing, tympanic membrane, external ear and ear canal normal.   Left Ear: Hearing, tympanic membrane, external ear and ear canal normal.   Nose: Nose normal.   Mouth/Throat: Uvula is midline, oropharynx is clear and moist and mucous membranes are normal.   Eyes: Conjunctivae, EOM and lids are normal. Pupils are equal, round, and reactive to light.   Neck: Phonation normal. Neck supple. Carotid bruit is not present.   Cardiovascular: Normal rate, regular rhythm and normal heart sounds.  Exam reveals no gallop and no friction rub.    No murmur heard.  Pulmonary/Chest: Effort normal and breath sounds normal. No respiratory distress.   Abdominal: Soft. Bowel sounds are normal. He exhibits no distension and no mass. There is no hepatosplenomegaly. There is no tenderness. There is no rebound and no guarding. No hernia.   Musculoskeletal: He exhibits no edema.    Haris had a diabetic foot exam performed today.   During the foot exam he had  a monofilament test performed (nl).    Vascular Status -  His exam exhibits right foot vasculature normal. His exam exhibits no right foot edema. His exam exhibits left foot vasculature normal. His exam exhibits no left foot edema.   Skin Integrity  -  His right foot skin is intact.     Haris 's left foot skin is intact. .  Neurological: He is alert and oriented to person, place, and time. Coordination and gait normal.   Skin: Skin is warm and dry.   Psychiatric: He has a normal mood and affect. His speech is normal and behavior is normal. Judgment and thought content normal.   Nursing note and vitals reviewed.      Assessment/Plan   Diagnoses and all orders for this visit:    Orthostatic hypotension    Chronic combined systolic and diastolic congestive heart failure    Non-insulin dependent type 2 diabetes mellitus    Chronic anticoagulation    Other orders  -     fludrocortisone 0.1 MG tablet; Take 1 tablet by mouth Daily.      Orthostatic hypotension we will try ProAmatine  Chronic combined systolic and diastolic CHF stable  Non-insulin-dependent type 2 diabetes follow hemoglobin A1c  Chronic anticoagulation follow closely

## 2017-06-19 RX ORDER — POTASSIUM CHLORIDE 750 MG/1
10 TABLET, FILM COATED, EXTENDED RELEASE ORAL DAILY
Qty: 30 TABLET | Refills: 3 | Status: SHIPPED | OUTPATIENT
Start: 2017-06-19 | End: 2017-08-06 | Stop reason: SDUPTHER

## 2017-07-05 RX ORDER — FLUTICASONE PROPIONATE 50 MCG
SPRAY, SUSPENSION (ML) NASAL
Qty: 16 ML | Refills: 0 | Status: SHIPPED | OUTPATIENT
Start: 2017-07-05 | End: 2017-08-03 | Stop reason: SDUPTHER

## 2017-08-02 ENCOUNTER — OFFICE VISIT (OUTPATIENT)
Dept: INTERNAL MEDICINE | Facility: CLINIC | Age: 82
End: 2017-08-02

## 2017-08-02 VITALS
SYSTOLIC BLOOD PRESSURE: 154 MMHG | TEMPERATURE: 98.7 F | BODY MASS INDEX: 25.1 KG/M2 | HEART RATE: 104 BPM | OXYGEN SATURATION: 97 % | HEIGHT: 64 IN | DIASTOLIC BLOOD PRESSURE: 82 MMHG | WEIGHT: 147 LBS

## 2017-08-02 DIAGNOSIS — R53.1 WEAKNESS GENERALIZED: ICD-10-CM

## 2017-08-02 DIAGNOSIS — I50.42 CHRONIC COMBINED SYSTOLIC AND DIASTOLIC CONGESTIVE HEART FAILURE (HCC): ICD-10-CM

## 2017-08-02 DIAGNOSIS — I48.0 PAROXYSMAL ATRIAL FIBRILLATION (HCC): ICD-10-CM

## 2017-08-02 DIAGNOSIS — I10 ESSENTIAL HYPERTENSION: Primary | ICD-10-CM

## 2017-08-02 PROCEDURE — 99214 OFFICE O/P EST MOD 30 MIN: CPT | Performed by: INTERNAL MEDICINE

## 2017-08-03 RX ORDER — FLUTICASONE PROPIONATE 50 MCG
SPRAY, SUSPENSION (ML) NASAL
Qty: 16 ML | Refills: 0 | Status: SHIPPED | OUTPATIENT
Start: 2017-08-03 | End: 2017-11-15 | Stop reason: SDUPTHER

## 2017-08-07 RX ORDER — POTASSIUM CHLORIDE 750 MG/1
TABLET, FILM COATED, EXTENDED RELEASE ORAL
Qty: 30 TABLET | Refills: 3 | Status: SHIPPED | OUTPATIENT
Start: 2017-08-07 | End: 2018-08-13
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
## 2017-08-14 DIAGNOSIS — R53.1 WEAKNESS GENERALIZED: ICD-10-CM
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
## 2017-08-17 NOTE — PROGRESS NOTES
Subjective   Haris Sweeney is a 88 y.o. male.   He is here today for hypertension generalized weakness combined systolic and diastolic congestive heart failure and PAF  History of Present Illness   He is here today for hypertension generalized weakness combined systolic and diastolic congestive heart failure and PAF  The following portions of the patient's history were reviewed and updated as appropriate: allergies, current medications, past family history, past medical history, past social history, past surgical history and problem list.    Review of Systems   Neurological: Positive for weakness.   All other systems reviewed and are negative.      Objective   Physical Exam   Constitutional: He is oriented to person, place, and time. He appears well-developed and well-nourished. He is cooperative.   HENT:   Head: Normocephalic and atraumatic.   Right Ear: Hearing, tympanic membrane, external ear and ear canal normal.   Left Ear: Hearing, tympanic membrane, external ear and ear canal normal.   Nose: Nose normal.   Mouth/Throat: Uvula is midline, oropharynx is clear and moist and mucous membranes are normal.   Eyes: Conjunctivae, EOM and lids are normal. Pupils are equal, round, and reactive to light.   Neck: Phonation normal. Neck supple. Carotid bruit is not present.   Cardiovascular: Normal rate, regular rhythm and normal heart sounds.  Exam reveals no gallop and no friction rub.    No murmur heard.  Pulmonary/Chest: Effort normal and breath sounds normal. No respiratory distress.   Abdominal: Soft. Bowel sounds are normal. He exhibits no distension and no mass. There is no hepatosplenomegaly. There is no tenderness. There is no rebound and no guarding. No hernia.   Musculoskeletal: He exhibits no edema.   Neurological: He is alert and oriented to person, place, and time. Coordination and gait normal.   Generalized weakness throughout   Skin: Skin is warm and dry.   Psychiatric: He has a normal mood and affect. His  speech is normal and behavior is normal. Judgment and thought content normal.   Nursing note and vitals reviewed.      Assessment/Plan   Diagnoses and all orders for this visit:    Essential hypertension    Weakness generalized  -     Cancel: Ambulatory Referral to Home Health    Chronic combined systolic and diastolic congestive heart failure  -     Ambulatory Referral to Home Health    Paroxysmal atrial fibrillation  -     Ambulatory Referral to Home Health      Hypertension well-controlled on current medication normally  Generalized weakness supportive meds and refer to home health for physical therapy  Chronic combined systolic and diastolic congestive heart failure referral to home health and supportive meds  PAF rate controlled and steady

## 2017-10-19 RX ORDER — POTASSIUM CHLORIDE 750 MG/1
TABLET, FILM COATED, EXTENDED RELEASE ORAL
Qty: 30 TABLET | Refills: 3 | Status: SHIPPED | OUTPATIENT
Start: 2017-10-19 | End: 2017-12-05 | Stop reason: SDUPTHER

## 2017-11-06 RX ORDER — GLIMEPIRIDE 1 MG/1
TABLET ORAL
Qty: 90 TABLET | Refills: 1 | Status: SHIPPED | OUTPATIENT
Start: 2017-11-06 | End: 2019-08-21 | Stop reason: SDUPTHER

## 2017-11-16 RX ORDER — FLUTICASONE PROPIONATE 50 MCG
SPRAY, SUSPENSION (ML) NASAL
Qty: 16 ML | Refills: 0 | Status: SHIPPED | OUTPATIENT
Start: 2017-11-16 | End: 2017-12-22 | Stop reason: SDUPTHER

## 2017-12-05 ENCOUNTER — OFFICE VISIT (OUTPATIENT)
Dept: INTERNAL MEDICINE | Facility: CLINIC | Age: 82
End: 2017-12-05

## 2017-12-05 VITALS
WEIGHT: 154 LBS | TEMPERATURE: 98.1 F | DIASTOLIC BLOOD PRESSURE: 78 MMHG | RESPIRATION RATE: 16 BRPM | HEIGHT: 64 IN | OXYGEN SATURATION: 95 % | SYSTOLIC BLOOD PRESSURE: 147 MMHG | BODY MASS INDEX: 26.29 KG/M2 | HEART RATE: 70 BPM

## 2017-12-05 DIAGNOSIS — I10 ESSENTIAL HYPERTENSION: ICD-10-CM

## 2017-12-05 DIAGNOSIS — Z79.01 CHRONIC ANTICOAGULATION: ICD-10-CM

## 2017-12-05 DIAGNOSIS — I48.0 PAROXYSMAL ATRIAL FIBRILLATION (HCC): ICD-10-CM

## 2017-12-05 DIAGNOSIS — M15.9 GENERALIZED OSTEOARTHRITIS OF MULTIPLE SITES: ICD-10-CM

## 2017-12-05 DIAGNOSIS — E11.9 NON-INSULIN DEPENDENT TYPE 2 DIABETES MELLITUS (HCC): ICD-10-CM

## 2017-12-05 DIAGNOSIS — E03.8 OTHER SPECIFIED HYPOTHYROIDISM: ICD-10-CM

## 2017-12-05 DIAGNOSIS — Z00.00 MEDICARE ANNUAL WELLNESS VISIT, SUBSEQUENT: Primary | ICD-10-CM

## 2017-12-05 DIAGNOSIS — I50.42 CHRONIC COMBINED SYSTOLIC AND DIASTOLIC CONGESTIVE HEART FAILURE (HCC): ICD-10-CM

## 2017-12-05 PROCEDURE — 99214 OFFICE O/P EST MOD 30 MIN: CPT | Performed by: INTERNAL MEDICINE

## 2017-12-05 PROCEDURE — G0439 PPPS, SUBSEQ VISIT: HCPCS | Performed by: INTERNAL MEDICINE

## 2017-12-05 RX ORDER — LANOLIN ALCOHOL/MO/W.PET/CERES
325 CREAM (GRAM) TOPICAL 2 TIMES DAILY
Refills: 3 | COMMUNITY
Start: 2017-11-09 | End: 2019-03-18

## 2017-12-05 RX ORDER — TIMOLOL MALEATE 5 MG/ML
0.5 SOLUTION/ DROPS OPHTHALMIC DAILY
Refills: 99 | COMMUNITY
Start: 2017-10-22

## 2017-12-05 NOTE — PATIENT INSTRUCTIONS
Medicare Wellness  Personal Prevention Plan of Service     Date of Office Visit:  2017  Encounter Provider:  Srikanth Madsen MD  Place of Service:  [facility]  Patient Name: Haris Sweeney  :  1929    As part of the Medicare Wellness portion of your visit today, we are providing you with this personalized preventive plan of services (PPPS). This plan is based upon recommendations of the [place] Preventive Services Task Force (USPSTF) and the Advisory Committee on Immunization Practices (ACIP).    This lists the preventive care services that should be considered, and provides dates of when you are due. Items listed as completed are up-to-date and do not require any further intervention.    Health Maintenance   Topic Date Due   • HEMOGLOBIN A1C  10/05/2017   • URINE MICROALBUMIN  2018   • DIABETIC FOOT EXAM  2018   • DIABETIC EYE EXAM  2018   • MEDICARE ANNUAL WELLNESS  2018   • TDAP/TD VACCINES (2 - Td) 10/05/2026   • INFLUENZA VACCINE  Completed   • PNEUMOCOCCAL VACCINES (65+ LOW/MEDIUM RISK)  Addressed   • ZOSTER VACCINE  Addressed       No orders of the defined types were placed in this encounter.      Return in about 6 months (around 2018) for Next scheduled follow up.

## 2017-12-05 NOTE — PROGRESS NOTES
QUICK REFERENCE INFORMATION:  The ABCs of the Annual Wellness Visit    Initial Medicare Wellness Visit    HEALTH RISK ASSESSMENT    6/14/1929    Recent Hospitalizations:  No hospitalization(s) within the last year..        Current Medical Providers:  Patient Care Team:  Srikanth Madsen MD as PCP - General (Internal Medicine)  Srikanth Madsen MD as PCP - Claims Attributed        Smoking Status:  History   Smoking Status   • Former Smoker   • Types: Cigarettes   Smokeless Tobacco   • Never Used       Alcohol Consumption:  History   Alcohol Use No       Depression Screen:   PHQ-2/PHQ-9 Depression Screening 12/5/2017   Little interest or pleasure in doing things 0   Feeling down, depressed, or hopeless 0   Trouble falling or staying asleep, or sleeping too much 0   Feeling tired or having little energy 0   Poor appetite or overeating 0   Feeling bad about yourself - or that you are a failure or have let yourself or your family down 0   Trouble concentrating on things, such as reading the newspaper or watching television 0   Moving or speaking so slowly that other people could have noticed. Or the opposite - being so fidgety or restless that you have been moving around a lot more than usual 0   Thoughts that you would be better off dead, or of hurting yourself in some way 0   Total Score 0   If you checked off any problems, how difficult have these problems made it for you to do your work, take care of things at home, or get along with other people? Not difficult at all       Health Habits and Functional and Cognitive Screening:  Functional & Cognitive Status 12/5/2017   Do you have difficulty preparing food and eating? No   Do you have difficulty bathing yourself, getting dressed or grooming yourself? No   Do you have difficulty using the toilet? No   Do you have difficulty moving around from place to place? No   Do you have trouble with steps or getting out of a bed or a chair? No   In the past year have you  fallen or experienced a near fall? No   Current Diet Well Balanced Diet   Dental Exam Up to date   Eye Exam Up to date   Exercise (times per week) 4 times per week   Current Exercise Activities Include Dancing   Do you need help using the phone?  No   Are you deaf or do you have serious difficulty hearing?  No   Do you need help with transportation? No   Do you need help shopping? No   Do you need help preparing meals?  No   Do you need help with housework?  No   Do you need help with laundry? No   Do you need help taking your medications? No   Do you need help managing money? No   Have you felt unusual stress, anger or loneliness in the last month? No   Who do you live with? Spouse   If you need help, do you have trouble finding someone available to you? No   Have you been bothered in the last four weeks by sexual problems? No   Do you have difficulty concentrating, remembering or making decisions? No           Does the patient have evidence of cognitive impairment? No    Asiprin use counseling: Contraindicated from taking ASA      Recent Lab Results:    Visual Acuity:  No exam data present    Age-appropriate Screening Schedule:  Refer to the list below for future screening recommendations based on patient's age, sex and/or medical conditions. Orders for these recommended tests are listed in the plan section. The patient has been provided with a written plan.    Health Maintenance   Topic Date Due   • HEMOGLOBIN A1C  10/05/2017   • URINE MICROALBUMIN  04/05/2018   • DIABETIC FOOT EXAM  06/01/2018   • DIABETIC EYE EXAM  09/28/2018   • TDAP/TD VACCINES (2 - Td) 10/05/2026   • INFLUENZA VACCINE  Completed   • PNEUMOCOCCAL VACCINES (65+ LOW/MEDIUM RISK)  Addressed   • ZOSTER VACCINE  Addressed        Subjective   History of Present Illness    Haris Sweeney is a 88 y.o. male who presents for an Annual Wellness Visit.    The following portions of the patient's history were reviewed and updated as appropriate: allergies,  current medications, past family history, past medical history, past social history, past surgical history and problem list.    Outpatient Medications Prior to Visit   Medication Sig Dispense Refill   • allopurinol (ZYLOPRIM) 100 MG tablet TAKE 1 TABLET BY MOUTH 2 (TWO) TIMES A DAY. 180 tablet 3   • B Complex Vitamins (VITAMIN B COMPLEX PO) Take  by mouth.     • cetirizine (zyrTEC) 5 MG tablet Take 1 tablet by mouth Daily. 90 tablet 3   • citalopram (CeleXA) 10 MG tablet TAKE 1 TABLET BY MOUTH EVERY DAY 90 tablet 2   • docusate sodium (COLACE) 100 MG capsule Take 100 mg by mouth 2 (two) times a day.     • finasteride (PROSCAR) 5 MG tablet Take 5 mg by mouth Daily.     • fluticasone (FLONASE) 50 MCG/ACT nasal spray INSTILL 2 SPRAYS INTO EACH NOSTRIL DAILY 16 mL 0   • furosemide (LASIX) 20 MG tablet 1 tablet daily  5   • glimepiride (AMARYL) 1 MG tablet 1 TABLET DAILY IF BLOOD SUGAR GREATER THAN 150 90 tablet 1   • glucose blood (ONE TOUCH ULTRA TEST) test strip Test glucose 1-2 times daily dx e11.9 100 each 12   • KLOR-CON 10 MEQ CR tablet TAKE 1 TABLET BY MOUTH DAILY. 30 tablet 3   • levothyroxine (SYNTHROID, LEVOTHROID) 100 MCG tablet TAKE 1 TABLET BY MOUTH DAILY. 90 tablet 3   • ONETOUCH DELICA LANCETS FINE misc Test glucose daily  dx e11.9 100 each 1   • pantoprazole (PROTONIX) 40 MG EC tablet Take 40 mg by mouth As Needed.     • Saw Palmetto 450 MG capsule Take  by mouth.     • warfarin (COUMADIN) 2.5 MG tablet TAKE 5MG ON MONDAY AND FRIDAY TAKE 2.5MG ALL OTHER DAYS.  0   • KLOR-CON 10 MEQ CR tablet TAKE 1 TABLET BY MOUTH DAILY. 30 tablet 3   • warfarin (COUMADIN) 2.5 MG tablet TAKE 5MG ON MONDAY, WEDNESDAY, AND FRIDAY. TAKE 2.5MG THE OTHER DAYS OF THE WEEK       No facility-administered medications prior to visit.        Patient Active Problem List   Diagnosis   • Non-insulin dependent type 2 diabetes mellitus   • Generalized osteoarthritis of multiple sites   • Depression   • A-fib   • Essential hypertension  "  • Hypothyroidism   • Chronic gout   • GERD (gastroesophageal reflux disease)   • Encounter for prostate cancer screening   • Bilateral carpal tunnel syndrome   • Chronic anticoagulation   • Paresthesia of both hands   • Medicare annual wellness visit, subsequent   • Bilateral hand pain   • Traumatic subarachnoid hemorrhage with loss of consciousness of 30 minutes or less   • Fall at home   • Chronic combined systolic and diastolic congestive heart failure   • Hospital discharge follow-up   • Orthostatic hypotension   • Weakness generalized       Advance Care Planning:  has an advance directive - a copy has been provided and is in file    Identification of Risk Factors:  Risk factors include: increased fall risk.    Review of Systems    Compared to one year ago, the patient feels his physical health is better.  Compared to one year ago, the patient feels his mental health is the same.    Objective     Physical Exam    Vitals:    12/05/17 1206   BP: 147/78   BP Location: Left arm   Patient Position: Sitting   Cuff Size: Adult   Pulse: 70   Resp: 16   Temp: 98.1 °F (36.7 °C)   TempSrc: Oral   SpO2: 95%   Weight: 69.9 kg (154 lb)   Height: 162.6 cm (64\")   PainSc: 0-No pain       Body mass index is 26.43 kg/(m^2).  Discussed the patient's BMI with him. The BMI is in the acceptable range.    Assessment/Plan   Patient Self-Management and Personalized Health Advice  The patient has been provided with information about: fall prevention and preventive services including:   · Fall Risk plan of care done.    Visit Diagnoses:  No diagnosis found.    No orders of the defined types were placed in this encounter.      Outpatient Encounter Prescriptions as of 12/5/2017   Medication Sig Dispense Refill   • allopurinol (ZYLOPRIM) 100 MG tablet TAKE 1 TABLET BY MOUTH 2 (TWO) TIMES A DAY. 180 tablet 3   • B Complex Vitamins (VITAMIN B COMPLEX PO) Take  by mouth.     • cetirizine (zyrTEC) 5 MG tablet Take 1 tablet by mouth Daily. 90 " tablet 3   • citalopram (CeleXA) 10 MG tablet TAKE 1 TABLET BY MOUTH EVERY DAY 90 tablet 2   • docusate sodium (COLACE) 100 MG capsule Take 100 mg by mouth 2 (two) times a day.     • ferrous sulfate 325 (65 FE) MG EC tablet Take 325 mg by mouth 2 (Two) Times a Day  3   • finasteride (PROSCAR) 5 MG tablet Take 5 mg by mouth Daily.     • fluticasone (FLONASE) 50 MCG/ACT nasal spray INSTILL 2 SPRAYS INTO EACH NOSTRIL DAILY 16 mL 0   • furosemide (LASIX) 20 MG tablet 1 tablet daily  5   • glimepiride (AMARYL) 1 MG tablet 1 TABLET DAILY IF BLOOD SUGAR GREATER THAN 150 90 tablet 1   • glucose blood (ONE TOUCH ULTRA TEST) test strip Test glucose 1-2 times daily dx e11.9 100 each 12   • KLOR-CON 10 MEQ CR tablet TAKE 1 TABLET BY MOUTH DAILY. 30 tablet 3   • levothyroxine (SYNTHROID, LEVOTHROID) 100 MCG tablet TAKE 1 TABLET BY MOUTH DAILY. 90 tablet 3   • ONETOUCH DELICA LANCETS FINE misc Test glucose daily  dx e11.9 100 each 1   • pantoprazole (PROTONIX) 40 MG EC tablet Take 40 mg by mouth As Needed.     • Saw Palmetto 450 MG capsule Take  by mouth.     • timolol (TIMOPTIC) 0.5 % ophthalmic solution Administer 0.5 drops to both eyes Daily  99   • warfarin (COUMADIN) 2.5 MG tablet TAKE 5MG ON MONDAY AND FRIDAY TAKE 2.5MG ALL OTHER DAYS.  0   • [DISCONTINUED] KLOR-CON 10 MEQ CR tablet TAKE 1 TABLET BY MOUTH DAILY. 30 tablet 3   • [DISCONTINUED] warfarin (COUMADIN) 2.5 MG tablet TAKE 5MG ON MONDAY, WEDNESDAY, AND FRIDAY. TAKE 2.5MG THE OTHER DAYS OF THE WEEK       No facility-administered encounter medications on file as of 12/5/2017.        Reviewed use of high risk medication in the elderly: yes  Reviewed for potential of harmful drug interactions in the elderly: yes    Follow Up:  No Follow-up on file.     An After Visit Summary and PPPS with all of these plans were given to the patient.

## 2017-12-06 LAB
ALBUMIN SERPL-MCNC: 4 G/DL (ref 3.5–5.2)
ALBUMIN/GLOB SERPL: 1.2 G/DL
ALP SERPL-CCNC: 90 U/L (ref 39–117)
ALT SERPL-CCNC: 14 U/L (ref 1–41)
APPEARANCE UR: CLEAR
AST SERPL-CCNC: 20 U/L (ref 1–40)
BACTERIA #/AREA URNS HPF: ABNORMAL /HPF
BASOPHILS # BLD AUTO: 0.05 10*3/MM3 (ref 0–0.2)
BASOPHILS NFR BLD AUTO: 0.7 % (ref 0–1.5)
BILIRUB SERPL-MCNC: 1.4 MG/DL (ref 0.1–1.2)
BILIRUB UR QL STRIP: NEGATIVE
BUN SERPL-MCNC: 23 MG/DL (ref 8–23)
BUN/CREAT SERPL: 27.1 (ref 7–25)
CALCIUM SERPL-MCNC: 9.8 MG/DL (ref 8.6–10.5)
CHLORIDE SERPL-SCNC: 100 MMOL/L (ref 98–107)
CO2 SERPL-SCNC: 29.1 MMOL/L (ref 22–29)
COLOR UR: YELLOW
CREAT SERPL-MCNC: 0.85 MG/DL (ref 0.76–1.27)
EOSINOPHIL # BLD AUTO: 0.4 10*3/MM3 (ref 0–0.7)
EOSINOPHIL NFR BLD AUTO: 5.8 % (ref 0.3–6.2)
EPI CELLS #/AREA URNS HPF: ABNORMAL /HPF
ERYTHROCYTE [DISTWIDTH] IN BLOOD BY AUTOMATED COUNT: 17 % (ref 11.5–14.5)
GFR SERPLBLD CREATININE-BSD FMLA CKD-EPI: 103 ML/MIN/1.73
GFR SERPLBLD CREATININE-BSD FMLA CKD-EPI: 85 ML/MIN/1.73
GLOBULIN SER CALC-MCNC: 3.4 GM/DL
GLUCOSE SERPL-MCNC: 53 MG/DL (ref 65–99)
GLUCOSE UR QL: NEGATIVE
HBA1C MFR BLD: 6.2 % (ref 4.8–5.6)
HCT VFR BLD AUTO: 40.9 % (ref 40.4–52.2)
HGB BLD-MCNC: 13 G/DL (ref 13.7–17.6)
HGB UR QL STRIP: NEGATIVE
IMM GRANULOCYTES # BLD: 0 10*3/MM3 (ref 0–0.03)
IMM GRANULOCYTES NFR BLD: 0 % (ref 0–0.5)
KETONES UR QL STRIP: NEGATIVE
LEUKOCYTE ESTERASE UR QL STRIP: NEGATIVE
LYMPHOCYTES # BLD AUTO: 1.54 10*3/MM3 (ref 0.9–4.8)
LYMPHOCYTES NFR BLD AUTO: 22.4 % (ref 19.6–45.3)
MCH RBC QN AUTO: 32.6 PG (ref 27–32.7)
MCHC RBC AUTO-ENTMCNC: 31.8 G/DL (ref 32.6–36.4)
MCV RBC AUTO: 102.5 FL (ref 79.8–96.2)
MICROALBUMIN UR-MCNC: 85.9 UG/ML
MONOCYTES # BLD AUTO: 1.03 10*3/MM3 (ref 0.2–1.2)
MONOCYTES NFR BLD AUTO: 15 % (ref 5–12)
NEUTROPHILS # BLD AUTO: 3.85 10*3/MM3 (ref 1.9–8.1)
NEUTROPHILS NFR BLD AUTO: 56.1 % (ref 42.7–76)
NITRITE UR QL STRIP: NEGATIVE
PH UR STRIP: 7 [PH] (ref 5–8)
PLATELET # BLD AUTO: 183 10*3/MM3 (ref 140–500)
POTASSIUM SERPL-SCNC: 4.5 MMOL/L (ref 3.5–5.2)
PROT SERPL-MCNC: 7.4 G/DL (ref 6–8.5)
PROT UR QL STRIP: (no result)
RBC # BLD AUTO: 3.99 10*6/MM3 (ref 4.6–6)
RBC #/AREA URNS HPF: ABNORMAL /HPF
SODIUM SERPL-SCNC: 139 MMOL/L (ref 136–145)
SP GR UR: 1.02 (ref 1–1.03)
T4 FREE SERPL-MCNC: 1.5 NG/DL (ref 0.93–1.7)
TSH SERPL DL<=0.005 MIU/L-ACNC: 2.68 MIU/ML (ref 0.27–4.2)
UROBILINOGEN UR STRIP-MCNC: (no result) MG/DL
WBC # BLD AUTO: 6.87 10*3/MM3 (ref 4.5–10.7)
WBC #/AREA URNS HPF: ABNORMAL /HPF

## 2017-12-17 NOTE — PROGRESS NOTES
Subjective   Haris Sweeney is a 88 y.o. male.   He is here today for Medicare annual wellness visit subsequent along with hypertension chronic diastolic and systolic congestive heart failure non-insulin-dependent type 2 diabetes PAF hypothyroidism osteoarthritis multiple sites chronic anticoagulation and he has no complaints  History of Present Illness   He is here today follow-up for subsequent Medicare annual wellness visit for which recommendations are listed along with  Hypertension medication adjustment as needed and noted  Chronic diastolic and systolic congestive heart free stable on current medications include including diuretics  Non-insulin-dependent type 2 diabetes follow hemoglobin A1c with yearly ophthalmology visits and regular foot exams proper low-carb diet  PAF rate controlled and steady on current medications and no evidence of any instability  Hypothyroidism stable on levothyroxin we will check TSH free T4  Osteoarthritis multiple sites supportive meds but avoid NSAIDs  Chronic anticoagulation on this for PAF and steady with no evidence of any clotting  The following portions of the patient's history were reviewed and updated as appropriate: allergies, current medications, past family history, past medical history, past social history, past surgical history and problem list.    Review of Systems   Musculoskeletal: Positive for arthralgias.   All other systems reviewed and are negative.      Objective   Physical Exam   Constitutional: He is oriented to person, place, and time. He appears well-developed and well-nourished. He is cooperative.   HENT:   Head: Normocephalic and atraumatic.   Right Ear: Hearing, tympanic membrane, external ear and ear canal normal.   Left Ear: Hearing, tympanic membrane, external ear and ear canal normal.   Nose: Nose normal.   Mouth/Throat: Uvula is midline, oropharynx is clear and moist and mucous membranes are normal.   Eyes: Conjunctivae, EOM and lids are normal.  Pupils are equal, round, and reactive to light.   Neck: Phonation normal. Neck supple. Carotid bruit is not present.   Cardiovascular: Normal rate, regular rhythm and normal heart sounds.  Exam reveals no gallop and no friction rub.    No murmur heard.  Pulmonary/Chest: Effort normal and breath sounds normal. No respiratory distress.   Abdominal: Soft. Bowel sounds are normal. He exhibits no distension and no mass. There is no hepatosplenomegaly. There is no tenderness. There is no rebound and no guarding. No hernia.   Musculoskeletal: He exhibits tenderness (multiple sites). He exhibits no edema.   Neurological: He is alert and oriented to person, place, and time. Coordination and gait normal.   Skin: Skin is warm and dry.   Psychiatric: He has a normal mood and affect. His speech is normal and behavior is normal. Judgment and thought content normal.   Nursing note and vitals reviewed.      Assessment/Plan   Diagnoses and all orders for this visit:    Medicare annual wellness visit, subsequent  -     Hemoglobin A1c  -     CBC & Differential  -     Comprehensive Metabolic Panel  -     T4, Free  -     TSH  -     Urinalysis With Microscopic - Urine, Clean Catch  -     MicroAlbumin, Urine, Random - Urine, Clean Catch    Essential hypertension  -     Hemoglobin A1c  -     CBC & Differential  -     Comprehensive Metabolic Panel  -     T4, Free  -     TSH  -     Urinalysis With Microscopic - Urine, Clean Catch  -     MicroAlbumin, Urine, Random - Urine, Clean Catch    Chronic combined systolic and diastolic congestive heart failure  -     Hemoglobin A1c  -     CBC & Differential  -     Comprehensive Metabolic Panel  -     T4, Free  -     TSH  -     Urinalysis With Microscopic - Urine, Clean Catch  -     MicroAlbumin, Urine, Random - Urine, Clean Catch    Paroxysmal atrial fibrillation  -     Hemoglobin A1c  -     CBC & Differential  -     Comprehensive Metabolic Panel  -     T4, Free  -     TSH  -     Urinalysis With  Microscopic - Urine, Clean Catch  -     MicroAlbumin, Urine, Random - Urine, Clean Catch    Non-insulin dependent type 2 diabetes mellitus  -     Hemoglobin A1c  -     CBC & Differential  -     Comprehensive Metabolic Panel  -     T4, Free  -     TSH  -     Urinalysis With Microscopic - Urine, Clean Catch  -     MicroAlbumin, Urine, Random - Urine, Clean Catch    Other specified hypothyroidism  -     Hemoglobin A1c  -     CBC & Differential  -     Comprehensive Metabolic Panel  -     T4, Free  -     TSH  -     Urinalysis With Microscopic - Urine, Clean Catch  -     MicroAlbumin, Urine, Random - Urine, Clean Catch    Generalized osteoarthritis of multiple sites  -     Hemoglobin A1c  -     CBC & Differential  -     Comprehensive Metabolic Panel  -     T4, Free  -     TSH  -     Urinalysis With Microscopic - Urine, Clean Catch  -     MicroAlbumin, Urine, Random - Urine, Clean Catch    Chronic anticoagulation  -     Hemoglobin A1c  -     CBC & Differential  -     Comprehensive Metabolic Panel  -     T4, Free  -     TSH  -     Urinalysis With Microscopic - Urine, Clean Catch  -     MicroAlbumin, Urine, Random - Urine, Clean Catch    Other orders  -     Microscopic Examination      Medicare annual wellness visit subsequent follow recommendations  Osteoporosis multiple sites supportive meds but avoid NSAIDs  Chronic anticoagulation continue current medications for PAF  PAF rate controlled and steady on current medication  Hypothyroidism stable on levothyroxin check TSH free T4  NIDDM follow hemoglobin A 1C with yearly of multiple visits and regular foot exams and check hemoglobin A1c and follow low-carb diet  PAF rate controlled and steady  Chronic combined systolic and diastolic congestive heart failure stable on current diuretics  Hypertension well-controlled on current medications which are listed

## 2017-12-22 RX ORDER — FLUTICASONE PROPIONATE 50 MCG
SPRAY, SUSPENSION (ML) NASAL
Qty: 16 ML | Refills: 0 | Status: SHIPPED | OUTPATIENT
Start: 2017-12-22 | End: 2018-01-10 | Stop reason: SDUPTHER

## 2018-01-08 RX ORDER — LEVOTHYROXINE SODIUM 0.1 MG/1
TABLET ORAL
Qty: 90 TABLET | Refills: 3 | Status: SHIPPED | OUTPATIENT
Start: 2018-01-08 | End: 2018-11-12 | Stop reason: SDUPTHER

## 2018-01-08 RX ORDER — ALLOPURINOL 100 MG/1
TABLET ORAL
Qty: 180 TABLET | Refills: 3 | Status: SHIPPED | OUTPATIENT
Start: 2018-01-08 | End: 2018-11-12 | Stop reason: SDUPTHER

## 2018-01-10 RX ORDER — FLUTICASONE PROPIONATE 50 MCG
SPRAY, SUSPENSION (ML) NASAL
Qty: 16 ML | Refills: 0 | Status: SHIPPED | OUTPATIENT
Start: 2018-01-10 | End: 2018-03-03 | Stop reason: SDUPTHER

## 2018-01-11 RX ORDER — CITALOPRAM 10 MG/1
TABLET ORAL
Qty: 90 TABLET | Refills: 2 | Status: SHIPPED | OUTPATIENT
Start: 2018-01-11 | End: 2018-11-19 | Stop reason: SDUPTHER

## 2018-03-05 RX ORDER — FLUTICASONE PROPIONATE 50 MCG
SPRAY, SUSPENSION (ML) NASAL
Qty: 16 ML | Refills: 0 | Status: SHIPPED | OUTPATIENT
Start: 2018-03-05 | End: 2018-04-18 | Stop reason: SDUPTHER

## 2018-03-14 RX ORDER — POTASSIUM CHLORIDE 750 MG/1
TABLET, EXTENDED RELEASE ORAL
Qty: 30 TABLET | Refills: 0 | Status: SHIPPED | OUTPATIENT
Start: 2018-03-14 | End: 2018-04-14 | Stop reason: SDUPTHER

## 2018-03-16 ENCOUNTER — TELEPHONE (OUTPATIENT)
Dept: INTERNAL MEDICINE | Facility: CLINIC | Age: 83
End: 2018-03-16

## 2018-03-16 NOTE — TELEPHONE ENCOUNTER
Patient was being billed for [id number] because it was listed first in the coding. Dr. Madsen made an addendum to switch the Sub AWV with the [id number]. Claim resubmitted.

## 2018-04-16 RX ORDER — POTASSIUM CHLORIDE 750 MG/1
TABLET, EXTENDED RELEASE ORAL
Qty: 30 TABLET | Refills: 0 | Status: SHIPPED | OUTPATIENT
Start: 2018-04-16 | End: 2018-05-21 | Stop reason: SDUPTHER

## 2018-04-18 RX ORDER — FLUTICASONE PROPIONATE 50 MCG
SPRAY, SUSPENSION (ML) NASAL
Qty: 16 ML | Refills: 0 | Status: SHIPPED | OUTPATIENT
Start: 2018-04-18 | End: 2018-04-25 | Stop reason: SDUPTHER

## 2018-04-25 RX ORDER — FLUTICASONE PROPIONATE 50 MCG
SPRAY, SUSPENSION (ML) NASAL
Qty: 16 ML | Refills: 0 | Status: SHIPPED | OUTPATIENT
Start: 2018-04-25 | End: 2018-06-01 | Stop reason: SDUPTHER

## 2018-05-21 RX ORDER — POTASSIUM CHLORIDE 750 MG/1
TABLET, EXTENDED RELEASE ORAL
Qty: 30 TABLET | Refills: 0 | Status: SHIPPED | OUTPATIENT
Start: 2018-05-21 | End: 2018-06-20 | Stop reason: SDUPTHER

## 2018-06-01 RX ORDER — FLUTICASONE PROPIONATE 50 MCG
SPRAY, SUSPENSION (ML) NASAL
Qty: 16 ML | Refills: 0 | Status: SHIPPED | OUTPATIENT
Start: 2018-06-01 | End: 2018-07-04 | Stop reason: SDUPTHER

## 2018-06-20 RX ORDER — POTASSIUM CHLORIDE 750 MG/1
TABLET, EXTENDED RELEASE ORAL
Qty: 30 TABLET | Refills: 0 | Status: SHIPPED | OUTPATIENT
Start: 2018-06-20 | End: 2018-07-02 | Stop reason: SDUPTHER

## 2018-07-02 RX ORDER — POTASSIUM CHLORIDE 750 MG/1
TABLET, FILM COATED, EXTENDED RELEASE ORAL
Qty: 30 TABLET | Refills: 0 | Status: SHIPPED | OUTPATIENT
Start: 2018-07-02 | End: 2018-08-13 | Stop reason: SDUPTHER

## 2018-07-05 RX ORDER — FLUTICASONE PROPIONATE 50 MCG
SPRAY, SUSPENSION (ML) NASAL
Qty: 16 ML | Refills: 0 | Status: SHIPPED | OUTPATIENT
Start: 2018-07-05 | End: 2018-08-02 | Stop reason: SDUPTHER

## 2018-07-11 ENCOUNTER — OFFICE VISIT (OUTPATIENT)
Dept: INTERNAL MEDICINE | Facility: CLINIC | Age: 83
End: 2018-07-11

## 2018-07-11 VITALS
DIASTOLIC BLOOD PRESSURE: 86 MMHG | TEMPERATURE: 98 F | HEART RATE: 88 BPM | BODY MASS INDEX: 25.16 KG/M2 | WEIGHT: 147.4 LBS | OXYGEN SATURATION: 97 % | SYSTOLIC BLOOD PRESSURE: 132 MMHG | HEIGHT: 64 IN

## 2018-07-11 DIAGNOSIS — I10 ESSENTIAL HYPERTENSION: ICD-10-CM

## 2018-07-11 DIAGNOSIS — H65.02 ACUTE SEROUS OTITIS MEDIA OF LEFT EAR, RECURRENCE NOT SPECIFIED: Primary | ICD-10-CM

## 2018-07-11 PROCEDURE — 99213 OFFICE O/P EST LOW 20 MIN: CPT | Performed by: INTERNAL MEDICINE

## 2018-07-11 RX ORDER — DOXYCYCLINE HYCLATE 100 MG
100 TABLET ORAL 2 TIMES DAILY
Qty: 20 TABLET | Refills: 0 | Status: SHIPPED | OUTPATIENT
Start: 2018-07-11 | End: 2018-08-02 | Stop reason: SDUPTHER

## 2018-07-24 NOTE — PROGRESS NOTES
Subjective   Haris Sweeney is a 89 y.o. male.   He is here today with otitis media of left ear along with follow-up for hypertension  History of Present Illness   He is here today with otitis media of left ear which is not stable and hypertension which is well-controlled on current medication  The following portions of the patient's history were reviewed and updated as appropriate: allergies, current medications, past family history, past medical history, past social history, past surgical history and problem list.    Review of Systems   HENT: Positive for ear pain (left ear).    All other systems reviewed and are negative.      Objective   Physical Exam   Constitutional: He is oriented to person, place, and time. He appears well-developed and well-nourished. He is cooperative.   HENT:   Head: Normocephalic and atraumatic.   Right Ear: Hearing, tympanic membrane, external ear and ear canal normal.   Left Ear: Hearing, external ear and ear canal normal. Tympanic membrane is erythematous.   Nose: Nose normal.   Mouth/Throat: Uvula is midline, oropharynx is clear and moist and mucous membranes are normal.   Eyes: Pupils are equal, round, and reactive to light. Conjunctivae, EOM and lids are normal.   Neck: Phonation normal. Neck supple. Carotid bruit is not present.   Cardiovascular: Normal rate, regular rhythm and normal heart sounds.  Exam reveals no gallop and no friction rub.    No murmur heard.  Pulmonary/Chest: Effort normal and breath sounds normal. No respiratory distress.   Abdominal: Soft. Bowel sounds are normal. He exhibits no distension and no mass. There is no hepatosplenomegaly. There is no tenderness. There is no rebound and no guarding. No hernia.   Musculoskeletal: He exhibits no edema.   Neurological: He is alert and oriented to person, place, and time. Coordination and gait normal.   Skin: Skin is warm and dry.   Psychiatric: He has a normal mood and affect. His speech is normal and behavior is  normal. Judgment and thought content normal.   Nursing note and vitals reviewed.      Assessment/Plan   Diagnoses and all orders for this visit:    Acute serous otitis media of left ear, recurrence not specified    Essential hypertension    Other orders  -     doxycycline (VIBRAMYICN) 100 MG tablet; Take 1 tablet by mouth 2 (Two) Times a Day.    Acute serous otitis media of left ear we will provide doxycycline and go from there  Hypertension well-controlled on current medication no changes needed

## 2018-08-01 ENCOUNTER — TELEPHONE (OUTPATIENT)
Dept: INTERNAL MEDICINE | Facility: CLINIC | Age: 83
End: 2018-08-01

## 2018-08-01 NOTE — TELEPHONE ENCOUNTER
Pt called and said he saw you on 7/1//18 and had an ear infection and now he is experiencing a lot of congestion and coughing up phlegm. Please advise.

## 2018-08-02 RX ORDER — DOXYCYCLINE HYCLATE 100 MG
100 TABLET ORAL 2 TIMES DAILY
Qty: 12 TABLET | Refills: 0 | Status: SHIPPED | OUTPATIENT
Start: 2018-08-02 | End: 2018-08-13

## 2018-08-02 RX ORDER — FLUTICASONE PROPIONATE 50 MCG
SPRAY, SUSPENSION (ML) NASAL
Qty: 16 ML | Refills: 3 | Status: SHIPPED | OUTPATIENT
Start: 2018-08-02 | End: 2018-11-06 | Stop reason: SDUPTHER

## 2018-08-13 ENCOUNTER — OFFICE VISIT (OUTPATIENT)
Dept: INTERNAL MEDICINE | Facility: CLINIC | Age: 83
End: 2018-08-13

## 2018-08-13 VITALS
WEIGHT: 148 LBS | TEMPERATURE: 97.1 F | SYSTOLIC BLOOD PRESSURE: 109 MMHG | OXYGEN SATURATION: 97 % | BODY MASS INDEX: 25.27 KG/M2 | HEIGHT: 64 IN | RESPIRATION RATE: 15 BRPM | DIASTOLIC BLOOD PRESSURE: 61 MMHG | HEART RATE: 87 BPM

## 2018-08-13 DIAGNOSIS — E03.9 ACQUIRED HYPOTHYROIDISM: ICD-10-CM

## 2018-08-13 DIAGNOSIS — I10 ESSENTIAL HYPERTENSION: Primary | ICD-10-CM

## 2018-08-13 DIAGNOSIS — M15.9 GENERALIZED OSTEOARTHRITIS OF MULTIPLE SITES: ICD-10-CM

## 2018-08-13 DIAGNOSIS — E11.9 NON-INSULIN DEPENDENT TYPE 2 DIABETES MELLITUS (HCC): ICD-10-CM

## 2018-08-13 DIAGNOSIS — I50.42 CHRONIC COMBINED SYSTOLIC AND DIASTOLIC CONGESTIVE HEART FAILURE (HCC): ICD-10-CM

## 2018-08-13 DIAGNOSIS — M1A.00X0 IDIOPATHIC CHRONIC GOUT WITHOUT TOPHUS, UNSPECIFIED SITE: ICD-10-CM

## 2018-08-13 PROBLEM — H65.02 ACUTE SEROUS OTITIS MEDIA OF LEFT EAR: Status: RESOLVED | Noted: 2018-07-11 | Resolved: 2018-08-13

## 2018-08-13 PROCEDURE — 99214 OFFICE O/P EST MOD 30 MIN: CPT | Performed by: INTERNAL MEDICINE

## 2018-08-13 RX ORDER — POTASSIUM CHLORIDE 750 MG/1
TABLET, FILM COATED, EXTENDED RELEASE ORAL
Qty: 30 TABLET | Refills: 0 | Status: SHIPPED | OUTPATIENT
Start: 2018-08-13 | End: 2018-12-05 | Stop reason: SDUPTHER

## 2018-08-13 RX ORDER — WARFARIN SODIUM 2.5 MG/1
TABLET ORAL
COMMUNITY
Start: 2018-07-26 | End: 2019-03-05

## 2018-08-14 LAB
ALBUMIN SERPL-MCNC: 3.6 G/DL (ref 3.5–5.2)
ALBUMIN/GLOB SERPL: 1.1 G/DL
ALP SERPL-CCNC: 108 U/L (ref 39–117)
ALT SERPL-CCNC: 19 U/L (ref 1–41)
APPEARANCE UR: CLEAR
AST SERPL-CCNC: 25 U/L (ref 1–40)
BACTERIA #/AREA URNS HPF: ABNORMAL /HPF
BASOPHILS # BLD AUTO: 0.03 10*3/MM3 (ref 0–0.2)
BASOPHILS NFR BLD AUTO: 0.5 % (ref 0–1.5)
BILIRUB SERPL-MCNC: 1.3 MG/DL (ref 0.1–1.2)
BILIRUB UR QL STRIP: NEGATIVE
BUN SERPL-MCNC: 13 MG/DL (ref 8–23)
BUN/CREAT SERPL: 15.9 (ref 7–25)
CALCIUM SERPL-MCNC: 9.6 MG/DL (ref 8.6–10.5)
CASTS URNS MICRO: ABNORMAL
CHLORIDE SERPL-SCNC: 99 MMOL/L (ref 98–107)
CO2 SERPL-SCNC: 27.5 MMOL/L (ref 22–29)
COLOR UR: (no result)
CREAT SERPL-MCNC: 0.82 MG/DL (ref 0.76–1.27)
EOSINOPHIL # BLD AUTO: 0.44 10*3/MM3 (ref 0–0.7)
EOSINOPHIL NFR BLD AUTO: 7.1 % (ref 0.3–6.2)
EPI CELLS #/AREA URNS HPF: ABNORMAL /HPF
ERYTHROCYTE [DISTWIDTH] IN BLOOD BY AUTOMATED COUNT: 16.3 % (ref 11.5–14.5)
GLOBULIN SER CALC-MCNC: 3.3 GM/DL
GLUCOSE SERPL-MCNC: 147 MG/DL (ref 65–99)
GLUCOSE UR QL: NEGATIVE
HBA1C MFR BLD: 5.9 % (ref 4.8–5.6)
HCT VFR BLD AUTO: 40.6 % (ref 40.4–52.2)
HGB BLD-MCNC: 12.3 G/DL (ref 13.7–17.6)
HGB UR QL STRIP: NEGATIVE
IMM GRANULOCYTES # BLD: 0 10*3/MM3 (ref 0–0.03)
IMM GRANULOCYTES NFR BLD: 0 % (ref 0–0.5)
KETONES UR QL STRIP: NEGATIVE
LEUKOCYTE ESTERASE UR QL STRIP: (no result)
LYMPHOCYTES # BLD AUTO: 1.81 10*3/MM3 (ref 0.9–4.8)
LYMPHOCYTES NFR BLD AUTO: 29.3 % (ref 19.6–45.3)
MCH RBC QN AUTO: 31.6 PG (ref 27–32.7)
MCHC RBC AUTO-ENTMCNC: 30.3 G/DL (ref 32.6–36.4)
MCV RBC AUTO: 104.4 FL (ref 79.8–96.2)
MICROALBUMIN UR-MCNC: 50.1 UG/ML
MONOCYTES # BLD AUTO: 0.67 10*3/MM3 (ref 0.2–1.2)
MONOCYTES NFR BLD AUTO: 10.9 % (ref 5–12)
NEUTROPHILS # BLD AUTO: 3.22 10*3/MM3 (ref 1.9–8.1)
NEUTROPHILS NFR BLD AUTO: 52.2 % (ref 42.7–76)
NITRITE UR QL STRIP: NEGATIVE
PH UR STRIP: 7 [PH] (ref 5–8)
PLATELET # BLD AUTO: 227 10*3/MM3 (ref 140–500)
POTASSIUM SERPL-SCNC: 4.6 MMOL/L (ref 3.5–5.2)
PROT SERPL-MCNC: 6.9 G/DL (ref 6–8.5)
PROT UR QL STRIP: (no result)
RBC # BLD AUTO: 3.89 10*6/MM3 (ref 4.6–6)
RBC #/AREA URNS HPF: ABNORMAL /HPF
SODIUM SERPL-SCNC: 138 MMOL/L (ref 136–145)
SP GR UR: 1.02 (ref 1–1.03)
T4 FREE SERPL-MCNC: 1.62 NG/DL (ref 0.93–1.7)
TSH SERPL DL<=0.005 MIU/L-ACNC: 1.27 MIU/ML (ref 0.27–4.2)
URATE SERPL-MCNC: 3.6 MG/DL (ref 3.4–7)
UROBILINOGEN UR STRIP-MCNC: (no result) MG/DL
WBC # BLD AUTO: 6.17 10*3/MM3 (ref 4.5–10.7)
WBC #/AREA URNS HPF: ABNORMAL /HPF

## 2018-08-26 NOTE — PROGRESS NOTES
Subjective   Haris Sweeney is a 89 y.o. male.   He is here today for hypertension chronic systolic and diastolic heart failure NIDDM hypothyroidism arthritis of multiple sites chronic gout and he has no complaints and everything is stable  History of Present Illness   He is here today for chronic diastolic and systolic heart failure which is stable on current medication NIDDM which has been very well controlled on current medication hypothyroid as an which is stable on levothyroxin arthritis multiple sites which is stable on current medication and chronic gout which is stable as well  The following portions of the patient's history were reviewed and updated as appropriate: allergies, current medications, past family history, past medical history, past social history, past surgical history and problem list.    Review of Systems   All other systems reviewed and are negative.      Objective   Physical Exam   Constitutional: He is oriented to person, place, and time. He appears well-developed and well-nourished. He is cooperative.   HENT:   Head: Normocephalic and atraumatic.   Right Ear: Hearing, tympanic membrane, external ear and ear canal normal.   Left Ear: Hearing, tympanic membrane, external ear and ear canal normal.   Nose: Nose normal.   Mouth/Throat: Uvula is midline, oropharynx is clear and moist and mucous membranes are normal.   Eyes: Pupils are equal, round, and reactive to light. Conjunctivae, EOM and lids are normal.   Neck: Phonation normal. Neck supple. Carotid bruit is not present.   Cardiovascular: Normal rate, regular rhythm and normal heart sounds.  Exam reveals no gallop and no friction rub.    No murmur heard.  Pulmonary/Chest: Effort normal and breath sounds normal. No respiratory distress.   Abdominal: Soft. Bowel sounds are normal. He exhibits no distension and no mass. There is no hepatosplenomegaly. There is no tenderness. There is no rebound and no guarding. No hernia.   Musculoskeletal: He  exhibits no edema.   Neurological: He is alert and oriented to person, place, and time. Coordination and gait normal.   Skin: Skin is warm and dry.   Psychiatric: He has a normal mood and affect. His speech is normal and behavior is normal. Judgment and thought content normal.   Nursing note and vitals reviewed.      Assessment/Plan   Diagnoses and all orders for this visit:    Essential hypertension  -     CBC & Differential  -     Comprehensive Metabolic Panel  -     Urinalysis With Microscopic - Urine, Clean Catch    Chronic combined systolic and diastolic congestive heart failure (CMS/HCC)    Non-insulin dependent type 2 diabetes mellitus (CMS/HCC)  -     Hemoglobin A1c  -     MicroAlbumin, Urine, Random - Urine, Clean Catch    Acquired hypothyroidism  -     T4, Free  -     TSH    Generalized osteoarthritis of multiple sites    Idiopathic chronic gout without tophus, unspecified site  -     Uric Acid    Other orders  -     Microscopic Examination      Hypertension well-controlled on current medication  Chronic combined systolic and diastolic CHF stable on current diuretics and medications  NIDDM has been stable on current medication we will check A1c  Hyperthyroidism asthma stable on levothyroxin no changes needed  Arthritis multiple sites stable on current medication  Chronic gout stable on current medication as well

## 2018-11-06 RX ORDER — FLUTICASONE PROPIONATE 50 MCG
SPRAY, SUSPENSION (ML) NASAL
Qty: 16 ML | Refills: 3 | Status: SHIPPED | OUTPATIENT
Start: 2018-11-06 | End: 2019-02-04 | Stop reason: SDUPTHER

## 2018-11-12 RX ORDER — ALLOPURINOL 100 MG/1
100 TABLET ORAL 2 TIMES DAILY
Qty: 180 TABLET | Refills: 3 | Status: SHIPPED | OUTPATIENT
Start: 2018-11-12 | End: 2019-08-21 | Stop reason: SDUPTHER

## 2018-11-12 RX ORDER — LEVOTHYROXINE SODIUM 0.1 MG/1
100 TABLET ORAL DAILY
Qty: 90 TABLET | Refills: 3 | Status: SHIPPED | OUTPATIENT
Start: 2018-11-12 | End: 2019-08-21 | Stop reason: SDUPTHER

## 2018-11-19 RX ORDER — CITALOPRAM 10 MG/1
10 TABLET ORAL DAILY
Qty: 90 TABLET | Refills: 2 | Status: SHIPPED | OUTPATIENT
Start: 2018-11-19 | End: 2019-04-09 | Stop reason: DRUGHIGH

## 2018-12-05 RX ORDER — POTASSIUM CHLORIDE 750 MG/1
TABLET, FILM COATED, EXTENDED RELEASE ORAL
Qty: 90 TABLET | Refills: 2 | Status: SHIPPED | OUTPATIENT
Start: 2018-12-05 | End: 2019-08-21 | Stop reason: SDUPTHER

## 2018-12-11 RX ORDER — POTASSIUM CHLORIDE 750 MG/1
TABLET, FILM COATED, EXTENDED RELEASE ORAL
Qty: 90 TABLET | Refills: 2 | Status: SHIPPED | OUTPATIENT
Start: 2018-12-11 | End: 2019-03-18

## 2019-01-01 ENCOUNTER — OFFICE VISIT (OUTPATIENT)
Dept: INTERNAL MEDICINE | Facility: CLINIC | Age: 84
End: 2019-01-01

## 2019-01-01 VITALS
DIASTOLIC BLOOD PRESSURE: 70 MMHG | WEIGHT: 148 LBS | BODY MASS INDEX: 25.27 KG/M2 | SYSTOLIC BLOOD PRESSURE: 120 MMHG | HEIGHT: 64 IN

## 2019-01-01 DIAGNOSIS — K21.9 GASTROESOPHAGEAL REFLUX DISEASE WITHOUT ESOPHAGITIS: Chronic | ICD-10-CM

## 2019-01-01 DIAGNOSIS — C67.3 MALIGNANT NEOPLASM OF ANTERIOR WALL OF URINARY BLADDER (HCC): Chronic | ICD-10-CM

## 2019-01-01 DIAGNOSIS — E11.9 NON-INSULIN DEPENDENT TYPE 2 DIABETES MELLITUS (HCC): Chronic | ICD-10-CM

## 2019-01-01 DIAGNOSIS — I10 ESSENTIAL HYPERTENSION: Primary | Chronic | ICD-10-CM

## 2019-01-01 DIAGNOSIS — F32.5 MAJOR DEPRESSIVE DISORDER WITH SINGLE EPISODE, IN FULL REMISSION (HCC): Chronic | ICD-10-CM

## 2019-01-01 DIAGNOSIS — E03.9 ACQUIRED HYPOTHYROIDISM: Chronic | ICD-10-CM

## 2019-01-01 DIAGNOSIS — R26.81 UNSTABLE GAIT: Chronic | ICD-10-CM

## 2019-01-01 DIAGNOSIS — I48.0 PAROXYSMAL ATRIAL FIBRILLATION (HCC): ICD-10-CM

## 2019-01-01 LAB
ALBUMIN SERPL-MCNC: 3.7 G/DL (ref 3.5–5.2)
ALBUMIN/GLOB SERPL: 1 G/DL
ALP SERPL-CCNC: 72 U/L (ref 39–117)
ALT SERPL W P-5'-P-CCNC: 18 U/L (ref 1–41)
ANION GAP SERPL CALCULATED.3IONS-SCNC: 12 MMOL/L (ref 5–15)
AST SERPL-CCNC: 24 U/L (ref 1–40)
BILIRUB SERPL-MCNC: 1.3 MG/DL (ref 0.2–1.2)
BUN BLD-MCNC: 31 MG/DL (ref 8–23)
BUN/CREAT SERPL: 40.3 (ref 7–25)
CALCIUM SPEC-SCNC: 9.4 MG/DL (ref 8.2–9.6)
CHLORIDE SERPL-SCNC: 99 MMOL/L (ref 98–107)
CO2 SERPL-SCNC: 27 MMOL/L (ref 22–29)
CREAT BLD-MCNC: 0.77 MG/DL (ref 0.76–1.27)
GFR SERPL CREATININE-BSD FRML MDRD: 95 ML/MIN/1.73
GLOBULIN UR ELPH-MCNC: 3.7 GM/DL
GLUCOSE BLD-MCNC: 102 MG/DL (ref 65–99)
HBA1C MFR BLD: 5.4 % (ref 4.8–5.6)
POTASSIUM BLD-SCNC: 4.7 MMOL/L (ref 3.5–5.2)
PROT SERPL-MCNC: 7.4 G/DL (ref 6–8.5)
SODIUM BLD-SCNC: 138 MMOL/L (ref 136–145)
TSH SERPL DL<=0.05 MIU/L-ACNC: 8.28 UIU/ML (ref 0.27–4.2)

## 2019-01-01 PROCEDURE — 83036 HEMOGLOBIN GLYCOSYLATED A1C: CPT | Performed by: INTERNAL MEDICINE

## 2019-01-01 PROCEDURE — 84443 ASSAY THYROID STIM HORMONE: CPT | Performed by: INTERNAL MEDICINE

## 2019-01-01 PROCEDURE — 80053 COMPREHEN METABOLIC PANEL: CPT | Performed by: INTERNAL MEDICINE

## 2019-02-04 RX ORDER — FLUTICASONE PROPIONATE 50 MCG
2 SPRAY, SUSPENSION (ML) NASAL DAILY
Qty: 3 BOTTLE | Refills: 3 | Status: SHIPPED | OUTPATIENT
Start: 2019-02-04 | End: 2020-01-01 | Stop reason: SDUPTHER

## 2019-03-05 ENCOUNTER — HOSPITAL ENCOUNTER (EMERGENCY)
Facility: HOSPITAL | Age: 84
Discharge: HOME | End: 2019-03-05
Attending: EMERGENCY MEDICINE | Admitting: EMERGENCY MEDICINE

## 2019-03-05 VITALS
RESPIRATION RATE: 16 BRPM | HEART RATE: 115 BPM | TEMPERATURE: 97.9 F | SYSTOLIC BLOOD PRESSURE: 125 MMHG | OXYGEN SATURATION: 99 % | HEIGHT: 64 IN | WEIGHT: 157.5 LBS | BODY MASS INDEX: 26.89 KG/M2 | DIASTOLIC BLOOD PRESSURE: 73 MMHG

## 2019-03-05 DIAGNOSIS — A04.72 C. DIFFICILE COLITIS: Primary | ICD-10-CM

## 2019-03-05 DIAGNOSIS — T45.511A POISONING BY WARFARIN SODIUM, ACCIDENTAL OR UNINTENTIONAL, INITIAL ENCOUNTER: ICD-10-CM

## 2019-03-05 LAB
ADV 40+41 DNA STL QL NAA+NON-PROBE: NOT DETECTED
ALBUMIN SERPL-MCNC: 2.8 G/DL (ref 3.5–5.2)
ALBUMIN/GLOB SERPL: 0.8 G/DL
ALP SERPL-CCNC: 72 U/L (ref 39–117)
ALT SERPL W P-5'-P-CCNC: 9 U/L (ref 1–41)
ANION GAP SERPL CALCULATED.3IONS-SCNC: 9.7 MMOL/L
AST SERPL-CCNC: 16 U/L (ref 1–40)
ASTRO TYP 1-8 RNA STL QL NAA+NON-PROBE: NOT DETECTED
BASOPHILS # BLD AUTO: 0.06 10*3/MM3 (ref 0–0.2)
BASOPHILS NFR BLD AUTO: 0.5 % (ref 0–1.5)
BILIRUB SERPL-MCNC: 1.4 MG/DL (ref 0.1–1.2)
BUN BLD-MCNC: 14 MG/DL (ref 8–23)
BUN/CREAT SERPL: 18.2 (ref 7–25)
C CAYETANENSIS DNA STL QL NAA+NON-PROBE: NOT DETECTED
C DIFF TOX GENS STL QL NAA+PROBE: POSITIVE
CALCIUM SPEC-SCNC: 8.6 MG/DL (ref 8.6–10.5)
CAMPY SP DNA.DIARRHEA STL QL NAA+PROBE: NOT DETECTED
CHLORIDE SERPL-SCNC: 104 MMOL/L (ref 98–107)
CO2 SERPL-SCNC: 26.3 MMOL/L (ref 22–29)
CREAT BLD-MCNC: 0.77 MG/DL (ref 0.76–1.27)
CRYPTOSP STL CULT: NOT DETECTED
DEPRECATED RDW RBC AUTO: 61.6 FL (ref 37–54)
E COLI DNA SPEC QL NAA+PROBE: NOT DETECTED
E HISTOLYT AG STL-ACNC: NOT DETECTED
EAEC PAA PLAS AGGR+AATA ST NAA+NON-PRB: NOT DETECTED
EC STX1 + STX2 GENES STL NAA+PROBE: NOT DETECTED
EOSINOPHIL # BLD AUTO: 0.07 10*3/MM3 (ref 0–0.4)
EOSINOPHIL NFR BLD AUTO: 0.6 % (ref 0.3–6.2)
EPEC EAE GENE STL QL NAA+NON-PROBE: NOT DETECTED
ERYTHROCYTE [DISTWIDTH] IN BLOOD BY AUTOMATED COUNT: 16.4 % (ref 12.3–15.4)
ETEC LTA+ST1A+ST1B TOX ST NAA+NON-PROBE: NOT DETECTED
G LAMBLIA DNA SPEC QL NAA+PROBE: NOT DETECTED
GFR SERPL CREATININE-BSD FRML MDRD: 95 ML/MIN/1.73
GLOBULIN UR ELPH-MCNC: 3.3 GM/DL
GLUCOSE BLD-MCNC: 111 MG/DL (ref 65–99)
HCT VFR BLD AUTO: 40.6 % (ref 37.5–51)
HGB BLD-MCNC: 13 G/DL (ref 13–17.7)
IMM GRANULOCYTES # BLD AUTO: 0.34 10*3/MM3 (ref 0–0.05)
IMM GRANULOCYTES NFR BLD AUTO: 2.7 % (ref 0–0.5)
INR PPP: 8.97 (ref 0.9–1.1)
LIPASE SERPL-CCNC: 17 U/L (ref 13–60)
LYMPHOCYTES # BLD AUTO: 1.36 10*3/MM3 (ref 0.7–3.1)
LYMPHOCYTES NFR BLD AUTO: 10.8 % (ref 19.6–45.3)
MCH RBC QN AUTO: 32.4 PG (ref 26.6–33)
MCHC RBC AUTO-ENTMCNC: 32 G/DL (ref 31.5–35.7)
MCV RBC AUTO: 101.2 FL (ref 79–97)
MONOCYTES # BLD AUTO: 1.2 10*3/MM3 (ref 0.1–0.9)
MONOCYTES NFR BLD AUTO: 9.6 % (ref 5–12)
NEUTROPHILS # BLD AUTO: 9.53 10*3/MM3 (ref 1.4–7)
NEUTROPHILS NFR BLD AUTO: 75.8 % (ref 42.7–76)
NOROVIRUS GI+II RNA STL QL NAA+NON-PROBE: NOT DETECTED
NRBC BLD AUTO-RTO: 0.1 /100 WBC (ref 0–0)
P SHIGELLOIDES DNA STL QL NAA+NON-PROBE: NOT DETECTED
PLATELET # BLD AUTO: 341 10*3/MM3 (ref 140–450)
PMV BLD AUTO: 9.6 FL (ref 6–12)
POTASSIUM BLD-SCNC: 3.6 MMOL/L (ref 3.5–5.2)
PROT SERPL-MCNC: 6.1 G/DL (ref 6–8.5)
PROTHROMBIN TIME: 72.2 SECONDS (ref 11.7–14.2)
RBC # BLD AUTO: 4.01 10*6/MM3 (ref 4.14–5.8)
RV RNA STL NAA+PROBE: NOT DETECTED
SALMONELLA DNA SPEC QL NAA+PROBE: NOT DETECTED
SAPO I+II+IV+V RNA STL QL NAA+NON-PROBE: NOT DETECTED
SHIGELLA SP+EIEC IPAH STL QL NAA+PROBE: NOT DETECTED
SODIUM BLD-SCNC: 140 MMOL/L (ref 136–145)
V CHOLERAE DNA SPEC QL NAA+PROBE: NOT DETECTED
VIBRIO DNA SPEC NAA+PROBE: NOT DETECTED
WBC NRBC COR # BLD: 12.56 10*3/MM3 (ref 3.4–10.8)
YERSINIA STL CULT: NOT DETECTED

## 2019-03-05 PROCEDURE — 85610 PROTHROMBIN TIME: CPT | Performed by: PHYSICIAN ASSISTANT

## 2019-03-05 PROCEDURE — 80053 COMPREHEN METABOLIC PANEL: CPT | Performed by: PHYSICIAN ASSISTANT

## 2019-03-05 PROCEDURE — 87493 C DIFF AMPLIFIED PROBE: CPT | Performed by: PHYSICIAN ASSISTANT

## 2019-03-05 PROCEDURE — 99283 EMERGENCY DEPT VISIT LOW MDM: CPT

## 2019-03-05 PROCEDURE — 83690 ASSAY OF LIPASE: CPT | Performed by: PHYSICIAN ASSISTANT

## 2019-03-05 PROCEDURE — 85025 COMPLETE CBC W/AUTO DIFF WBC: CPT | Performed by: PHYSICIAN ASSISTANT

## 2019-03-05 PROCEDURE — 87999 UNLISTED MICROBIOLOGY PX: CPT | Performed by: PHYSICIAN ASSISTANT

## 2019-03-05 RX ORDER — SODIUM CHLORIDE 0.9 % (FLUSH) 0.9 %
10 SYRINGE (ML) INJECTION AS NEEDED
Status: DISCONTINUED | OUTPATIENT
Start: 2019-03-05 | End: 2019-03-05 | Stop reason: HOSPADM

## 2019-03-05 RX ORDER — VANCOMYCIN HYDROCHLORIDE 125 MG/1
125 CAPSULE ORAL 4 TIMES DAILY
Qty: 40 CAPSULE | Refills: 0 | Status: SHIPPED | OUTPATIENT
Start: 2019-03-05 | End: 2019-03-18

## 2019-03-05 NOTE — ED NOTES
Pt states he has been having diarrhea x2 weeks. Pt seen today at [facility]. Pt abdomen is tender with pain 4/10. Pt is aware of stool and urine collection but is unable to do so at this time.      Beckie Tinsley, RN  03/05/19 1700

## 2019-03-05 NOTE — ED TRIAGE NOTES
Patient c/o abdominal pain and diarrhea with mucous x2 weeks, patient had outpatient abdominal CT at [facility] today, patient told he has colitis and cdiff

## 2019-03-05 NOTE — ED PROVIDER NOTES
EMERGENCY DEPARTMENT ENCOUNTER    CHIEF COMPLAINT  Chief Complaint: abd pain and BM with mucous  History given by: pt and wife  History limited by: poor historian  Room Number: 25/25  PMD: Srikanth Madsen MD      HPI:  Pt is a 89 y.o. male who presents complaining of abd pain and diarrhea with mucous that has worsened over the past 2 weeks. Pt had a CT at the urology office today that showed colitis and was sent here for further work-up. Pt admits to increased urgency to have a BM. Pt denies nausea and vomiting. Pt states he was on Clinda for an infection 2 weeks ago.     Duration:  2 weeks  Onset: gradual   Timing: constant   Location: lower abd  Radiation: none specified   Quality: pain  Intensity/Severity: mild  Progression: worsened  Associated Symptoms: diarrhea (with mucous in BM)  Aggravating Factors: none specified  Alleviating Factors: none specified  Previous Episodes: none   Treatment before arrival: none    PAST MEDICAL HISTORY  Active Ambulatory Problems     Diagnosis Date Noted   • Non-insulin dependent type 2 diabetes mellitus (CMS/[facility]) 03/29/2016   • Generalized osteoarthritis of multiple sites 03/29/2016   • Depression 03/29/2016   • A-fib (CMS/[facility]) 03/29/2016   • Essential hypertension 03/29/2016   • Hypothyroidism 03/29/2016   • Chronic gout 03/29/2016   • GERD (gastroesophageal reflux disease) 03/29/2016   • Encounter for prostate cancer screening 03/29/2016   • Bilateral carpal tunnel syndrome 10/05/2016   • Chronic anticoagulation 10/05/2016   • Paresthesia of both hands 10/05/2016   • Medicare annual wellness visit, subsequent 10/13/2016   • Bilateral hand pain 01/26/2017   • Traumatic subarachnoid hemorrhage with loss of consciousness of 30 minutes or less (CMS/[facility]) 04/17/2017   • Fall at home 05/01/2017   • Chronic combined systolic and diastolic congestive heart failure (CMS/[facility]) 05/01/2017   • Hospital discharge follow-up 05/01/2017   • Orthostatic hypotension 06/01/2017   • Weakness  generalized 08/02/2017     Resolved Ambulatory Problems     Diagnosis Date Noted   • Syncope and collapse 04/17/2017   • Acute serous otitis media of left ear 07/11/2018     Past Medical History:   Diagnosis Date   • A-fib (CMS/[facility])    • Arthritis    • Bladder cancer (CMS/[facility])    • CHF (congestive heart failure) (CMS/[facility])    • Depression    • Diabetes mellitus (CMS/[facility])    • GERD (gastroesophageal reflux disease)    • Hypertension    • Kidney stones    • Macular degeneration    • Neuromuscular disorder (CMS/[facility])    • Thyroid goiter        PAST SURGICAL HISTORY  Past Surgical History:   Procedure Laterality Date   • CATARACT EXTRACTION     • CATARACT EXTRACTION     • THYROIDECTOMY     • TOTAL THYROIDECTOMY         FAMILY HISTORY  Family History   Problem Relation Age of Onset   • Heart disease Mother    • No Known Problems Father    • Prostate cancer Brother        SOCIAL HISTORY  Social History     Socioeconomic History   • Marital status:      Spouse name: Not on file   • Number of children: Not on file   • Years of education: Not on file   • Highest education level: Not on file   Social Needs   • Financial resource strain: Not on file   • Food insecurity - worry: Not on file   • Food insecurity - inability: Not on file   • Transportation needs - medical: Not on file   • Transportation needs - non-medical: Not on file   Occupational History   • Not on file   Tobacco Use   • Smoking status: Former Smoker     Types: Cigarettes   • Smokeless tobacco: Never Used   Substance and Sexual Activity   • Alcohol use: No   • Drug use: No   • Sexual activity: Not on file   Other Topics Concern   • Not on file   Social History Narrative   • Not on file       ALLERGIES  Digoxin and related and Keflex [cephalexin]    REVIEW OF SYSTEMS  Review of Systems   Constitutional: Negative for activity change, appetite change and fever.   HENT: Negative for congestion and sore throat.    Eyes: Negative.    Respiratory: Negative for  cough and shortness of breath.    Cardiovascular: Negative for chest pain and leg swelling.   Gastrointestinal: Positive for abdominal pain and diarrhea (with mucous). Negative for nausea and vomiting.        Increased urgency to BM.   Genitourinary: Negative for decreased urine volume and dysuria.   Musculoskeletal: Negative for neck pain.   Skin: Negative for rash and wound.   Neurological: Negative for weakness, numbness and headaches.   All other systems reviewed and are negative.      PHYSICAL EXAM  ED Triage Vitals [03/05/19 1622]   Temp Heart Rate Resp BP SpO2   97.9 °F (36.6 °C) 115 16 -- 100 %      Temp src Heart Rate Source Patient Position BP Location FiO2 (%)   Tympanic -- -- -- --       Physical Exam   Constitutional: He is oriented to person, place, and time. No distress.   HENT:   Head: Normocephalic and atraumatic.   Eyes: EOM are normal. Pupils are equal, round, and reactive to light.   Neck: Normal range of motion. Neck supple.   Cardiovascular: Normal rate and normal heart sounds. An irregularly irregular rhythm present.   Pulmonary/Chest: Effort normal and breath sounds normal. No respiratory distress.   Abdominal: Soft. There is tenderness (mild) in the suprapubic area. There is no rebound and no guarding.   Musculoskeletal: Normal range of motion. He exhibits no edema.   Neurological: He is alert and oriented to person, place, and time. He has normal sensation and normal strength.   Skin: Skin is warm and dry.   Psychiatric: Mood and affect normal.   Nursing note and vitals reviewed.      LAB RESULTS  Lab Results (last 24 hours)     Procedure Component Value Units Date/Time    CBC & Differential [[id number]] Collected:  03/05/19 1647    Specimen:  Blood Updated:  03/05/19 1701    Narrative:       The following orders were created for panel order CBC & Differential.  Procedure                               Abnormality         Status                     ---------                                -----------         ------                     CBC Auto Differential[[id number]]        Abnormal            Final result                 Please view results for these tests on the individual orders.    Comprehensive Metabolic Panel [[id number]]  (Abnormal) Collected:  03/05/19 1647    Specimen:  Blood Updated:  03/05/19 1720     Glucose 111 mg/dL      BUN 14 mg/dL      Creatinine 0.77 mg/dL      Sodium 140 mmol/L      Potassium 3.6 mmol/L      Chloride 104 mmol/L      CO2 26.3 mmol/L      Calcium 8.6 mg/dL      Total Protein 6.1 g/dL      Albumin 2.80 g/dL      ALT (SGPT) 9 U/L      AST (SGOT) 16 U/L      Alkaline Phosphatase 72 U/L      Total Bilirubin 1.4 mg/dL      eGFR Non African Amer 95 mL/min/1.73      Globulin 3.3 gm/dL      A/G Ratio 0.8 g/dL      BUN/Creatinine Ratio 18.2     Anion Gap 9.7 mmol/L     Narrative:       The MDRD GFR formula is only valid for adults with stable renal function between ages 18 and 70.    Lipase [[id number]]  (Normal) Collected:  03/05/19 1647    Specimen:  Blood Updated:  03/05/19 1720     Lipase 17 U/L     Protime-INR [[id number]]  (Abnormal) Collected:  03/05/19 1647    Specimen:  Blood Updated:  03/05/19 1722     Protime 72.2 Seconds      INR 8.97    CBC Auto Differential [[id number]]  (Abnormal) Collected:  03/05/19 1647    Specimen:  Blood Updated:  03/05/19 1701     WBC 12.56 10*3/mm3      RBC 4.01 10*6/mm3      Hemoglobin 13.0 g/dL      Hematocrit 40.6 %      .2 fL      MCH 32.4 pg      MCHC 32.0 g/dL      RDW 16.4 %      RDW-SD 61.6 fl      MPV 9.6 fL      Platelets 341 10*3/mm3      Neutrophil % 75.8 %      Lymphocyte % 10.8 %      Monocyte % 9.6 %      Eosinophil % 0.6 %      Basophil % 0.5 %      Immature Grans % 2.7 %      Neutrophils, Absolute 9.53 10*3/mm3      Lymphocytes, Absolute 1.36 10*3/mm3      Monocytes, Absolute 1.20 10*3/mm3      Eosinophils, Absolute 0.07 10*3/mm3      Basophils, Absolute 0.06 10*3/mm3      Immature Grans, Absolute 0.34 10*3/mm3       nRBC 0.1 /100 WBC     Gastrointestinal Panel, PCR - Stool, Per Rectum [[id number]] Collected:  03/05/19 1718    Specimen:  Stool from Per Rectum Updated:  03/05/19 1721    Clostridium Difficile Toxin - Stool, Per Rectum [[id number]] Collected:  03/05/19 1718    Specimen:  Stool from Per Rectum Updated:  03/05/19 1916    Narrative:       The following orders were created for panel order Clostridium Difficile Toxin - Stool, Per Rectum.  Procedure                               Abnormality         Status                     ---------                               -----------         ------                     Clostridium Difficile To...[[id number]]  Abnormal            Final result                 Please view results for these tests on the individual orders.    Clostridium Difficile Toxin, PCR - Stool, Per Rectum [[id number]]  (Abnormal) Collected:  03/05/19 1718    Specimen:  Stool from Per Rectum Updated:  03/05/19 1916     C. Difficile Toxins by PCR Positive          I ordered the above labs and reviewed the results          PROCEDURES  Procedures      PROGRESS AND CONSULTS      1650  Discussed case with Dr. Rayo (radiolofy), states that pt has colitis of sigmoid colon.      1702 Discussed pt's case with Dr. Blue (ED) who agrees with plan of care.    1832 Recheck pt who is resting comfortably in NAD. D/w pt the lab results. Pt states when they were in [place] he went to visit the doctor and they prescribed him Clindamycin. Offered pt and family to go home and call with results but they would prefer to stay and wait for the stool culture result.     1927 Rechecked pt who is resting comfortably in NAD. D/w pt the lab results. D/w pt the plan to discharge with instructions to f/u with PCP. Pt and family understand and agree with plan, all questions were answered at this time.      MEDICAL DECISION MAKING  Results were reviewed/discussed with the patient and they were also made aware of online access. Pt also made  aware that some labs, such as cultures, will not be resulted during ER visit and follow up with PMD is necessary.     MDM  Number of Diagnoses or Management Options     Amount and/or Complexity of Data Reviewed  Clinical lab tests: ordered and reviewed (Protime: 72.2  INR: 8.97  C. diff: positive)  Decide to obtain previous medical records or to obtain history from someone other than the patient: yes           DIAGNOSIS  Final diagnoses:   C. difficile colitis   Poisoning by warfarin sodium, accidental or unintentional, initial encounter       DISPOSITION  DISCHARGE    Patient discharged in stable condition.    Reviewed implications of results, diagnosis, meds, responsibility to follow up, warning signs and symptoms of possible worsening, potential complications and reasons to return to ER.    Patient/Family voiced understanding of above instructions.    Discussed plan for discharge, as there is no emergent indication for admission. Patient referred to primary care provider for BP management due to today's BP. Pt/family is agreeable and understands need for follow up and repeat testing.  Pt is aware that discharge does not mean that nothing is wrong but it indicates no emergency is present that requires admission and they must continue care with follow-up as given below or physician of their choice.     FOLLOW-UP  Srikanth Madsen MD  [address] [address]  [phone]      See by Friday for recheck of symptoms         Medication List      New Prescriptions    vancomycin 125 MG capsule  Commonly known as:  VANCOCIN  Take 1 capsule by mouth 4 (Four) Times a Day.        Stop    warfarin 2.5 MG tablet  Commonly known as:  COUMADIN              Latest Documented Vital Signs:  As of 7:36 PM  BP- 125/73 HR- 115 Temp- 97.9 °F (36.6 °C) (Tympanic) O2 sat- 99%    --  Documentation assistance provided by tomas Ely for Fracisco ALVAREZ.  Information recorded by the tomas was done at my  direction and has been verified and validated by me.            Kateryna Ely  03/05/19 1936       Fracisco Rubio PA  03/05/19 9127

## 2019-03-05 NOTE — ED PROVIDER NOTES
Pt is a 89 y.o. male who presents to the ED complaining of abd pain and diarrhea that started a couple of weeks ago. Pt is having multiple episodes daily. Pt states diarrhea is mostly mucous and sometimes has slight stool. Pt states he was at urologist this AM and had a CT abd/pelvis. Pt states he was told he has colitis and and suspected C. Diff. Pt states he was recently on abx. Wife at bedside. Pt is on Warfarin for aFib.       On exam,  GENERAL: not distressed  HENT: nares patent, mucous membranes-slightly dry  EYES: no scleral icterus  CV: regular rhythm, regular rate  RESPIRATORY: normal effort  ABDOMEN: soft, LLQ abd tenderness  MUSCULOSKELETAL: no deformity  NEURO: alert, JOYA, FC  SKIN: warm, dry    Vital signs and nursing notes reviewed.      Plan: Review lab work for further evaluation.       MD ATTESTATION NOTE    The NANCY and I have discussed this patient's history, physical exam, and treatment plan.  I have reviewed the documentation and personally had a face to face interaction with the patient. I affirm the documentation and agree with the treatment and plan.  The attached note describes my personal findings.      Documentation assistance provided by tomas De Jesus for MD Kayden. Information recorded by the scribe was done at my direction and has been verified and validated by me.             Dorothy De Jesus  03/05/19 6073       Doug Blue II, MD  03/06/19 0005

## 2019-03-06 NOTE — ED NOTES
Patient provided discharge instructions at this time.  Respirations are even and unlabored, chest rise and fall is equal in expansion.  All patients questions answered prior to departure from the ED.  Pt ambulated from ED with steady gait, capillary refill within normal limit, speech normal.       Ilene Alex, IRENE  03/05/19 2000

## 2019-03-08 ENCOUNTER — OFFICE VISIT (OUTPATIENT)
Dept: INTERNAL MEDICINE | Facility: CLINIC | Age: 84
End: 2019-03-08

## 2019-03-08 VITALS
TEMPERATURE: 97.2 F | DIASTOLIC BLOOD PRESSURE: 64 MMHG | WEIGHT: 149 LBS | BODY MASS INDEX: 25.44 KG/M2 | OXYGEN SATURATION: 100 % | HEIGHT: 64 IN | HEART RATE: 114 BPM | SYSTOLIC BLOOD PRESSURE: 104 MMHG

## 2019-03-08 DIAGNOSIS — I10 ESSENTIAL HYPERTENSION: ICD-10-CM

## 2019-03-08 DIAGNOSIS — A04.72 C. DIFFICILE COLITIS: Primary | ICD-10-CM

## 2019-03-08 DIAGNOSIS — Z79.01 CHRONIC ANTICOAGULATION: ICD-10-CM

## 2019-03-08 PROCEDURE — 99213 OFFICE O/P EST LOW 20 MIN: CPT | Performed by: INTERNAL MEDICINE

## 2019-03-08 RX ORDER — COLESEVELAM HYDROCHLORIDE 3.75 G/1
3.75 POWDER, FOR SUSPENSION ORAL DAILY
Qty: 12 EACH | Refills: 0 | Status: SHIPPED | OUTPATIENT
Start: 2019-03-08 | End: 2019-03-18

## 2019-03-12 RX ORDER — LANCETS 33 GAUGE
EACH MISCELLANEOUS
Qty: 100 EACH | Refills: 3 | Status: SHIPPED | OUTPATIENT
Start: 2019-03-12 | End: 2019-07-24 | Stop reason: SDUPTHER

## 2019-03-14 DIAGNOSIS — A09 DIARRHEA OF INFECTIOUS ORIGIN: ICD-10-CM

## 2019-03-14 DIAGNOSIS — R19.4 ENCOUNTER FOR DIAGNOSTIC COLONOSCOPY DUE TO CHANGE IN BOWEL HABITS: ICD-10-CM

## 2019-03-14 DIAGNOSIS — Z12.11 SCREEN FOR COLON CANCER: ICD-10-CM

## 2019-03-14 DIAGNOSIS — A04.72 C. DIFFICILE COLITIS: Primary | ICD-10-CM

## 2019-03-18 ENCOUNTER — OFFICE VISIT (OUTPATIENT)
Dept: INTERNAL MEDICINE | Facility: CLINIC | Age: 84
End: 2019-03-18

## 2019-03-18 VITALS
BODY MASS INDEX: 25.44 KG/M2 | TEMPERATURE: 97.6 F | HEART RATE: 90 BPM | SYSTOLIC BLOOD PRESSURE: 97 MMHG | HEIGHT: 64 IN | DIASTOLIC BLOOD PRESSURE: 67 MMHG | WEIGHT: 149 LBS | RESPIRATION RATE: 16 BRPM | OXYGEN SATURATION: 97 %

## 2019-03-18 DIAGNOSIS — E11.9 NON-INSULIN DEPENDENT TYPE 2 DIABETES MELLITUS (HCC): ICD-10-CM

## 2019-03-18 DIAGNOSIS — I50.42 CHRONIC COMBINED SYSTOLIC AND DIASTOLIC CONGESTIVE HEART FAILURE (HCC): Primary | ICD-10-CM

## 2019-03-18 DIAGNOSIS — I10 ESSENTIAL HYPERTENSION: ICD-10-CM

## 2019-03-18 PROCEDURE — 99213 OFFICE O/P EST LOW 20 MIN: CPT | Performed by: INTERNAL MEDICINE

## 2019-03-18 RX ORDER — LOSARTAN POTASSIUM 25 MG/1
25 TABLET ORAL
COMMUNITY
End: 2019-06-18

## 2019-03-18 RX ORDER — WARFARIN SODIUM 2.5 MG/1
TABLET ORAL
COMMUNITY
Start: 2019-01-02 | End: 2019-06-18

## 2019-03-18 RX ORDER — COLESEVELAM HYDROCHLORIDE 3.75 G/1
POWDER, FOR SUSPENSION ORAL DAILY
COMMUNITY

## 2019-03-18 RX ORDER — LATANOPROST 50 UG/ML
SOLUTION/ DROPS OPHTHALMIC
COMMUNITY
Start: 2019-03-06

## 2019-03-18 RX ORDER — FUROSEMIDE 20 MG/1
TABLET ORAL
Qty: 60 TABLET | Refills: 5 | Status: SHIPPED | OUTPATIENT
Start: 2019-03-18 | End: 2019-08-21 | Stop reason: SDUPTHER

## 2019-03-20 ENCOUNTER — TELEPHONE (OUTPATIENT)
Dept: INTERNAL MEDICINE | Facility: CLINIC | Age: 84
End: 2019-03-20

## 2019-03-20 NOTE — TELEPHONE ENCOUNTER
Pt is on lasix BID-pt called stating that he has lost >11# in two days, BP is 79/57 with resting heart rate at 118.    Dr Madsen advised to stop taking the lasix until Saturday and drink plenty of fluids and Gatorade in the mean time-resume lasix on Saturday once daily from here on out.    Pt advised and verbalized understanding

## 2019-03-21 DIAGNOSIS — A49.8 CLOSTRIDIUM DIFFICILE INFECTION: Primary | ICD-10-CM

## 2019-03-25 ENCOUNTER — OFFICE VISIT (OUTPATIENT)
Dept: INTERNAL MEDICINE | Facility: CLINIC | Age: 84
End: 2019-03-25

## 2019-03-25 VITALS
HEIGHT: 64 IN | OXYGEN SATURATION: 97 % | RESPIRATION RATE: 16 BRPM | WEIGHT: 140 LBS | TEMPERATURE: 97.3 F | DIASTOLIC BLOOD PRESSURE: 63 MMHG | HEART RATE: 107 BPM | BODY MASS INDEX: 23.9 KG/M2 | SYSTOLIC BLOOD PRESSURE: 96 MMHG

## 2019-03-25 DIAGNOSIS — I10 ESSENTIAL HYPERTENSION: ICD-10-CM

## 2019-03-25 DIAGNOSIS — S51.812A SKIN TEAR OF LEFT FOREARM WITHOUT COMPLICATION, INITIAL ENCOUNTER: ICD-10-CM

## 2019-03-25 DIAGNOSIS — I50.42 CHRONIC COMBINED SYSTOLIC AND DIASTOLIC CONGESTIVE HEART FAILURE (HCC): Primary | ICD-10-CM

## 2019-03-25 DIAGNOSIS — A04.72 C. DIFFICILE COLITIS: ICD-10-CM

## 2019-03-25 PROCEDURE — 99214 OFFICE O/P EST MOD 30 MIN: CPT | Performed by: INTERNAL MEDICINE

## 2019-03-26 LAB
C DIFF TOX GENS STL QL NAA+PROBE: POSITIVE
Lab: NORMAL
Lab: NORMAL

## 2019-03-27 LAB
C DIFF TOX A+B STL QL IA: POSITIVE
WRITTEN AUTHORIZATION: NORMAL

## 2019-03-29 ENCOUNTER — TELEPHONE (OUTPATIENT)
Dept: INTERNAL MEDICINE | Facility: CLINIC | Age: 84
End: 2019-03-29

## 2019-03-29 NOTE — TELEPHONE ENCOUNTER
Pt called and stated that he thinks the furosemide is not working and would like his citalopram increased if possible? Pt says he has been on this for years and thinks it just doesn't take as much as affect as it use to. Please advise.

## 2019-03-29 NOTE — TELEPHONE ENCOUNTER
Pt is still C-diff positive, I had Dr. Upton and Maria M Earl look over the results. They advised Flagyl 500mg for TID. And would like the Pt to come back in to recheck his stool in three weeks.      But,  I did notice that the Pt was on warfarin , I warned the patient of the side affects, so I continued to ask the Pt about his INR and warfarin. Pt responded that his INR was at 5.1 and that he was not going to be taking his warfarin until further notice and that he had an appt on monday to recheck his INR with his coumadin clinic.I advised Maria M and Dr. Upton about the Warfarin and they also advised to hold off on the medication and to wait for Dr. Madsen to return to the clinic. I then advised the Pt we will hold off on sending in the medication until Dr. Madsen returns and we discuss with him further options.   And after his INR comes down some. We weill call the Pt back Monday.

## 2019-04-01 ENCOUNTER — HOSPITAL ENCOUNTER (EMERGENCY)
Facility: HOSPITAL | Age: 84
Discharge: HOME | End: 2019-04-01
Attending: EMERGENCY MEDICINE | Admitting: EMERGENCY MEDICINE

## 2019-04-01 ENCOUNTER — OFFICE VISIT (OUTPATIENT)
Dept: WOUND CARE | Facility: HOSPITAL | Age: 84
End: 2019-04-01

## 2019-04-01 VITALS
HEIGHT: 64 IN | BODY MASS INDEX: 23.22 KG/M2 | RESPIRATION RATE: 16 BRPM | DIASTOLIC BLOOD PRESSURE: 69 MMHG | SYSTOLIC BLOOD PRESSURE: 103 MMHG | HEART RATE: 90 BPM | WEIGHT: 136 LBS | OXYGEN SATURATION: 100 % | TEMPERATURE: 97 F

## 2019-04-01 DIAGNOSIS — A04.72 C. DIFFICILE DIARRHEA: Primary | ICD-10-CM

## 2019-04-01 DIAGNOSIS — R79.1 SUPRATHERAPEUTIC INR: ICD-10-CM

## 2019-04-01 LAB
ANION GAP SERPL CALCULATED.3IONS-SCNC: 12.7 MMOL/L
BUN BLD-MCNC: 15 MG/DL (ref 8–23)
BUN/CREAT SERPL: 17.4 (ref 7–25)
CALCIUM SPEC-SCNC: 8.4 MG/DL (ref 8.6–10.5)
CHLORIDE SERPL-SCNC: 96 MMOL/L (ref 98–107)
CO2 SERPL-SCNC: 23.3 MMOL/L (ref 22–29)
CREAT BLD-MCNC: 0.86 MG/DL (ref 0.76–1.27)
DEPRECATED RDW RBC AUTO: 67.2 FL (ref 37–54)
ERYTHROCYTE [DISTWIDTH] IN BLOOD BY AUTOMATED COUNT: 18.6 % (ref 12.3–15.4)
GFR SERPL CREATININE-BSD FRML MDRD: 84 ML/MIN/1.73
GLUCOSE BLD-MCNC: 89 MG/DL (ref 65–99)
HCT VFR BLD AUTO: 34.8 % (ref 37.5–51)
HGB BLD-MCNC: 11.6 G/DL (ref 13–17.7)
HOLD SPECIMEN: NORMAL
HOLD SPECIMEN: NORMAL
INR PPP: 4.12 (ref 0.9–1.1)
LYMPHOCYTES # BLD MANUAL: 0.3 10*3/MM3 (ref 0.7–3.1)
LYMPHOCYTES NFR BLD MANUAL: 4.1 % (ref 19.6–45.3)
LYMPHOCYTES NFR BLD MANUAL: 5.2 % (ref 5–12)
MCH RBC QN AUTO: 33 PG (ref 26.6–33)
MCHC RBC AUTO-ENTMCNC: 33.3 G/DL (ref 31.5–35.7)
MCV RBC AUTO: 99.1 FL (ref 79–97)
MONOCYTES # BLD AUTO: 0.38 10*3/MM3 (ref 0.1–0.9)
MYELOCYTES NFR BLD MANUAL: 1 % (ref 0–0)
NEUTROPHILS # BLD AUTO: 6.55 10*3/MM3 (ref 1.4–7)
NEUTROPHILS NFR BLD MANUAL: 89.7 % (ref 42.7–76)
PLAT MORPH BLD: NORMAL
PLATELET # BLD AUTO: 228 10*3/MM3 (ref 140–450)
PMV BLD AUTO: 10.3 FL (ref 6–12)
POTASSIUM BLD-SCNC: 4.3 MMOL/L (ref 3.5–5.2)
PROTHROMBIN TIME: 39.7 SECONDS (ref 11.7–14.2)
RBC # BLD AUTO: 3.51 10*6/MM3 (ref 4.14–5.8)
SODIUM BLD-SCNC: 132 MMOL/L (ref 136–145)
TARGETS BLD QL SMEAR: ABNORMAL
WBC MORPH BLD: NORMAL
WBC NRBC COR # BLD: 7.3 10*3/MM3 (ref 3.4–10.8)
WHOLE BLOOD HOLD SPECIMEN: NORMAL
WHOLE BLOOD HOLD SPECIMEN: NORMAL

## 2019-04-01 PROCEDURE — 99283 EMERGENCY DEPT VISIT LOW MDM: CPT

## 2019-04-01 PROCEDURE — 85007 BL SMEAR W/DIFF WBC COUNT: CPT | Performed by: EMERGENCY MEDICINE

## 2019-04-01 PROCEDURE — 80048 BASIC METABOLIC PNL TOTAL CA: CPT | Performed by: EMERGENCY MEDICINE

## 2019-04-01 PROCEDURE — 85610 PROTHROMBIN TIME: CPT | Performed by: EMERGENCY MEDICINE

## 2019-04-01 PROCEDURE — G0463 HOSPITAL OUTPT CLINIC VISIT: HCPCS

## 2019-04-01 PROCEDURE — 85025 COMPLETE CBC W/AUTO DIFF WBC: CPT | Performed by: EMERGENCY MEDICINE

## 2019-04-01 RX ORDER — SODIUM CHLORIDE 0.9 % (FLUSH) 0.9 %
10 SYRINGE (ML) INJECTION AS NEEDED
Status: DISCONTINUED | OUTPATIENT
Start: 2019-04-01 | End: 2019-04-01 | Stop reason: HOSPADM

## 2019-04-01 RX ORDER — VANCOMYCIN HYDROCHLORIDE 125 MG/1
CAPSULE ORAL
Qty: 68 CAPSULE | Refills: 0 | Status: SHIPPED | OUTPATIENT
Start: 2019-04-01 | End: 2019-06-18

## 2019-04-01 NOTE — ED PROVIDER NOTES
EMERGENCY DEPARTMENT ENCOUNTER    Room Number:  39/39  Date seen:  4/1/2019  Time seen: 4:15 PM  PCP: Srikanth Madsen MD  Historian: Pt      HPI:  Chief Complaint: Abnormal lab  A complete HPI/ROS/PMH/PSH/SH/FH are unobtainable due to: none  Context: Haris Sweeney is a 89 y.o. male, anticoagulated on Coumadin for Hx of Afib, who presents to the ED c/o abnormal lab drawn earlier today. Pt states he was at the INR clinic today and had a finger stick that showed an INR of 7.7 and was sent here for further evaluation. Pt states that his INR was 5.1 3 days ago and had his Coumadin discontinued with his last dose 4 days ago. Pt states he has also been eating Kale. Pt states he was also Dx with C. Diff a month ago but states his Sx have improved and is only having diarrhea a couple of times a day. Pt denies hematochezia or abd pain. Pt states he was recently started back on lasix due to BLE swelling.  Denies having any bleeding whatsoever. He has c diff retested on Wednesday which was positive.     Pain Location: n/a  Radiation: none  Quality: abnormal lab  Intensity/Severity: 7.7  Duration: drawn PTA  Onset quality: gradual  Timing: constant  Progression: worsened  Aggravating Factors: none  Alleviating Factors: none  Previous Episodes: none stated  Treatment before arrival: Pt states his INR was discontinued 3 days ago.   Associated Symptoms: improved diarrhea for the past month    PAST MEDICAL HISTORY  Active Ambulatory Problems     Diagnosis Date Noted   • Non-insulin dependent type 2 diabetes mellitus (CMS/HCC) 03/29/2016   • Generalized osteoarthritis of multiple sites 03/29/2016   • Depression 03/29/2016   • A-fib (CMS/HCC) 03/29/2016   • Essential hypertension 03/29/2016   • Hypothyroidism 03/29/2016   • Chronic gout 03/29/2016   • GERD (gastroesophageal reflux disease) 03/29/2016   • Encounter for prostate cancer screening 03/29/2016   • Bilateral carpal tunnel syndrome 10/05/2016   • Chronic anticoagulation  10/05/2016   • Paresthesia of both hands 10/05/2016   • Medicare annual wellness visit, subsequent 10/13/2016   • Bilateral hand pain 01/26/2017   • Traumatic subarachnoid hemorrhage with loss of consciousness of 30 minutes or less (CMS/HCC) 04/17/2017   • Fall at home 05/01/2017   • Chronic combined systolic and diastolic congestive heart failure (CMS/[facility]) 05/01/2017   • Hospital discharge follow-up 05/01/2017   • Orthostatic hypotension 06/01/2017   • Weakness generalized 08/02/2017   • C. difficile colitis 03/08/2019   • Skin tear of left forearm without complication 03/25/2019     Resolved Ambulatory Problems     Diagnosis Date Noted   • Syncope and collapse 04/17/2017   • Acute serous otitis media of left ear 07/11/2018     Past Medical History:   Diagnosis Date   • A-fib (CMS/HCC)    • Arthritis    • Bladder cancer (CMS/HCC)    • CHF (congestive heart failure) (CMS/HCC)    • Depression    • Diabetes mellitus (CMS/HCC)    • GERD (gastroesophageal reflux disease)    • Hypertension    • Kidney stones    • Macular degeneration    • Neuromuscular disorder (CMS/HCC)    • Thyroid goiter          PAST SURGICAL HISTORY  Past Surgical History:   Procedure Laterality Date   • BLADDER SURGERY     • CATARACT EXTRACTION Bilateral    • CATARACT EXTRACTION     • THYROIDECTOMY     • TOTAL THYROIDECTOMY           FAMILY HISTORY  Family History   Problem Relation Age of Onset   • Heart disease Mother    • No Known Problems Father    • Prostate cancer Brother          SOCIAL HISTORY  Social History     Socioeconomic History   • Marital status:      Spouse name: Not on file   • Number of children: Not on file   • Years of education: Not on file   • Highest education level: Not on file   Tobacco Use   • Smoking status: Former Smoker     Types: Cigarettes   • Smokeless tobacco: Never Used   Substance and Sexual Activity   • Alcohol use: No   • Drug use: No   • Sexual activity: Defer         ALLERGIES  Digoxin and related and  Keflex [cephalexin]        REVIEW OF SYSTEMS  Review of Systems   Constitutional: Negative for activity change, appetite change and fever.   HENT: Negative for congestion and sore throat.    Eyes: Negative.    Respiratory: Negative for cough and shortness of breath.    Cardiovascular: Negative for chest pain and leg swelling.   Gastrointestinal: Positive for diarrhea (for one month, improved). Negative for abdominal pain and vomiting.   Endocrine: Negative.    Genitourinary: Negative for decreased urine volume and dysuria.   Musculoskeletal: Negative for neck pain.   Skin: Negative for rash and wound.   Allergic/Immunologic: Negative.    Neurological: Negative for weakness, numbness and headaches.   Hematological: Negative.    Psychiatric/Behavioral: Negative.    All other systems reviewed and are negative.           PHYSICAL EXAM  ED Triage Vitals   Temp Heart Rate Resp BP SpO2   04/01/19 1558 04/01/19 1558 04/01/19 1558 04/01/19 1600 04/01/19 1558   97 °F (36.1 °C) 90 16 100/55 98 %      Temp src Heart Rate Source Patient Position BP Location FiO2 (%)   04/01/19 1558 -- -- -- --   Tympanic             GENERAL: not distressed  HENT: nares patent  EYES: no scleral icterus  CV: regular rhythm, regular rate  RESPIRATORY: normal effort ctab  ABDOMEN: soft, nontender, non distended  MUSCULOSKELETAL: no deformity  NEURO: alert, JOYA, FC  SKIN: warm, dry    Vital signs and nursing notes reviewed.          LAB RESULTS  Recent Results (from the past 24 hour(s))   Protime-INR    Collection Time: 04/01/19  4:22 PM   Result Value Ref Range    Protime 39.7 (H) 11.7 - 14.2 Seconds    INR 4.12 (C) 0.90 - 1.10   Light Blue Top    Collection Time: 04/01/19  4:22 PM   Result Value Ref Range    Extra Tube hold for add-on    Green Top (Gel)    Collection Time: 04/01/19  4:22 PM   Result Value Ref Range    Extra Tube Hold for add-ons.    Lavender Top    Collection Time: 04/01/19  4:22 PM   Result Value Ref Range    Extra Tube hold for  add-on    Gold Top - SST    Collection Time: 04/01/19  4:22 PM   Result Value Ref Range    Extra Tube Hold for add-ons.    Basic Metabolic Panel    Collection Time: 04/01/19  4:22 PM   Result Value Ref Range    Glucose 89 65 - 99 mg/dL    BUN 15 8 - 23 mg/dL    Creatinine 0.86 0.76 - 1.27 mg/dL    Sodium 132 (L) 136 - 145 mmol/L    Potassium 4.3 3.5 - 5.2 mmol/L    Chloride 96 (L) 98 - 107 mmol/L    CO2 23.3 22.0 - 29.0 mmol/L    Calcium 8.4 (L) 8.6 - 10.5 mg/dL    eGFR Non African Amer 84 >60 mL/min/1.73    BUN/Creatinine Ratio 17.4 7.0 - 25.0    Anion Gap 12.7 mmol/L   CBC Auto Differential    Collection Time: 04/01/19  4:22 PM   Result Value Ref Range    WBC 7.30 3.40 - 10.80 10*3/mm3    RBC 3.51 (L) 4.14 - 5.80 10*6/mm3    Hemoglobin 11.6 (L) 13.0 - 17.7 g/dL    Hematocrit 34.8 (L) 37.5 - 51.0 %    MCV 99.1 (H) 79.0 - 97.0 fL    MCH 33.0 26.6 - 33.0 pg    MCHC 33.3 31.5 - 35.7 g/dL    RDW 18.6 (H) 12.3 - 15.4 %    RDW-SD 67.2 (H) 37.0 - 54.0 fl    MPV 10.3 6.0 - 12.0 fL    Platelets 228 140 - 450 10*3/mm3   Manual Differential    Collection Time: 04/01/19  4:22 PM   Result Value Ref Range    Neutrophil % 89.7 (H) 42.7 - 76.0 %    Lymphocyte % 4.1 (L) 19.6 - 45.3 %    Monocyte % 5.2 5.0 - 12.0 %    Myelocyte % 1.0 (H) 0.0 - 0.0 %    Neutrophils Absolute 6.55 1.40 - 7.00 10*3/mm3    Lymphocytes Absolute 0.30 (L) 0.70 - 3.10 10*3/mm3    Monocytes Absolute 0.38 0.10 - 0.90 10*3/mm3    Target Cells Slight/1+ None Seen    WBC Morphology Normal Normal    Platelet Morphology Normal Normal       Ordered the above labs and reviewed the results.      PROCEDURES  Procedures        MEDICATIONS GIVEN IN ER  Medications   sodium chloride 0.9 % flush 10 mL (not administered)                   PROGRESS AND CONSULTS       1650  Discussed with Dr Madsen.     1734  Rechecked pt. Pt is resting comfortably. Notified pt of lab work, including INR of 4.12. Informed pt his finger stick done today was inaccurate. Discussed the plan  to discharge the pt home with prescriptions for Vancomycin. I instructed the pt to f/u with PCP. Pt understands and agrees with the plan, all questions answered.      MEDICAL DECISION MAKING      MDM  Number of Diagnoses or Management Options  C. difficile diarrhea:   Supratherapeutic INR:   Diagnosis management comments: Patient with supratherapeutic INR.  It is actually improved compared to his fingerstick INR check earlier today.  We will continue to hold for 1 additional day and have the patient follow-up with his INR clinic tomorrow.  Given his worsened diarrhea after careful history obtained from patient and family, and after consultation with Dr. Madsen, I have started the patient on a vancomycin taper per IDSA guidelines.       Amount and/or Complexity of Data Reviewed  Clinical lab tests: ordered and reviewed (INR=4.12)  Review and summarize past medical records: yes (Last seen in ED in 2/2019 and given po vancomycin)  Discuss the patient with other providers: yes (Dr. Madsen)               DIAGNOSIS  Final diagnoses:   C. difficile diarrhea   Supratherapeutic INR         DISPOSITION  DISCHARGE    Patient discharged in stable condition.    Reviewed implications of results, diagnosis, meds, responsibility to follow up, warning signs and symptoms of possible worsening, potential complications and reasons to return to ER.    Patient/Family voiced understanding of above instructions.    Discussed plan for discharge, as there is no emergent indication for admission. Patient referred to primary care provider for BP management due to today's BP. Pt/family is agreeable and understands need for follow up and repeat testing.  Pt is aware that discharge does not mean that nothing is wrong but it indicates no emergency is present that requires admission and they must continue care with follow-up as given below or physician of their choice.     FOLLOW-UP  Srikanth Madsen MD  [address] [facility]  [address]  [facility] [address]  [phone]    Schedule an appointment as soon as possible for a visit   As needed for your diarrhea      Call your INR clinic tomorrow morning. INR today was 4.1.             Medication List      New Prescriptions    vancomycin 125 MG capsule  Commonly known as:  VANCOCIN  Take 1 capsule qid for 10 days. Then take 1 capsule bid for 7 days. Take 1   capsule daily for 7 days. Take 1 capsule qod day for 14 days.                      Latest Documented Vital Signs:  As of 6:55 PM  BP- 103/69 HR- 90 Temp- 97 °F (36.1 °C) (Tympanic) O2 sat- 100%        --  Documentation assistance provided by tomas Mayers for Dr. Mirza MD.  Information recorded by the scribe was done at my direction and has been verified and validated by me.             Suman Mayers  04/01/19 9712       Doug Blue II, MD  04/01/19 6489       Doug Blue II, MD  04/01/19 2911

## 2019-04-02 NOTE — PROGRESS NOTES
Subjective   Haris Sweeney is a 89 y.o. male.   He is here to follow-up for C. difficile colitis hypertension and chronic anticoagulation and his diarrhea has not resolved yet  History of Present Illness   He is here to follow-up for C. difficile colitis which may be still be present along with hypertension which is stable on current medication and chronic anticoagulation which is stable with no bruising or bleeding and his diarrhea has not resolved yet  The following portions of the patient's history were reviewed and updated as appropriate: allergies, current medications, past family history, past medical history, past social history, past surgical history and problem list.    Review of Systems   Constitutional: Positive for fatigue.   Gastrointestinal: Positive for diarrhea. Negative for abdominal pain and constipation.   Genitourinary: Negative for difficulty urinating.   Neurological: Positive for weakness.   All other systems reviewed and are negative.      Objective   Physical Exam   Constitutional: He is oriented to person, place, and time. He appears well-developed and well-nourished. He is cooperative.   HENT:   Head: Normocephalic and atraumatic.   Right Ear: Hearing, tympanic membrane, external ear and ear canal normal.   Left Ear: Hearing, tympanic membrane, external ear and ear canal normal.   Nose: Nose normal.   Mouth/Throat: Uvula is midline, oropharynx is clear and moist and mucous membranes are normal.   Eyes: Conjunctivae, EOM and lids are normal. Pupils are equal, round, and reactive to light.   Neck: Phonation normal. Neck supple. Carotid bruit is not present.   Cardiovascular: Normal rate, regular rhythm and normal heart sounds. Exam reveals no gallop and no friction rub.   No murmur heard.  Pulmonary/Chest: Effort normal and breath sounds normal. No respiratory distress.   Abdominal: Soft. Bowel sounds are normal. He exhibits no distension and no mass. There is no hepatosplenomegaly. There is  no tenderness. There is no rebound and no guarding. No hernia.   Musculoskeletal: He exhibits no edema.   Neurological: He is alert and oriented to person, place, and time. Coordination and gait normal.   Skin: Skin is warm and dry.   Psychiatric: He has a normal mood and affect. His speech is normal and behavior is normal. Judgment and thought content normal.   Nursing note and vitals reviewed.      Assessment/Plan   Diagnoses and all orders for this visit:    C. difficile colitis  -     Clostridium Difficile EIA - Stool, Per Rectum; Future    Essential hypertension    Chronic anticoagulation    Other orders  -     Discontinue: colesevelam (WELCHOL) 3.75 g pack pack; Take 1 packet by mouth Daily.      C. difficile colitis not stable yet we will check C. difficile EIA again  Hypertension stable on current medication no change  Chronic anticoagulation stable with no bruising or bleeding

## 2019-04-05 ENCOUNTER — APPOINTMENT (OUTPATIENT)
Dept: WOUND CARE | Facility: HOSPITAL | Age: 84
End: 2019-04-05

## 2019-04-07 NOTE — PROGRESS NOTES
Subjective   Haris Sweeney is a 89 y.o. male.   He is here today for chronic combined systolic and diastolic congestive heart failure along with hypertension and NIDDM  History of Present Illness   Is here today for chronic combined systolic and diastolic congestive heart failure which is not stable at this time along with hypertension which is stable and NIDDM which is fairly well controlled at this time  The following portions of the patient's history were reviewed and updated as appropriate: allergies, current medications, past family history, past medical history, past social history, past surgical history and problem list.    Review of Systems   Respiratory: Negative for shortness of breath and wheezing.    Cardiovascular: Positive for leg swelling.   Genitourinary: Negative for difficulty urinating.   All other systems reviewed and are negative.      Objective   Physical Exam   Constitutional: He is oriented to person, place, and time. He appears well-developed and well-nourished. He is cooperative.   HENT:   Head: Normocephalic and atraumatic.   Right Ear: Hearing, tympanic membrane, external ear and ear canal normal.   Left Ear: Hearing, tympanic membrane, external ear and ear canal normal.   Nose: Nose normal.   Mouth/Throat: Uvula is midline, oropharynx is clear and moist and mucous membranes are normal.   Eyes: Conjunctivae, EOM and lids are normal. Pupils are equal, round, and reactive to light.   Neck: Phonation normal. Neck supple. Carotid bruit is not present.   Cardiovascular: Normal rate, regular rhythm and normal heart sounds. Exam reveals no gallop and no friction rub.   No murmur heard.  Pulmonary/Chest: Effort normal and breath sounds normal. No respiratory distress.   Abdominal: Soft. Bowel sounds are normal. He exhibits no distension and no mass. There is no hepatosplenomegaly. There is no tenderness. There is no rebound and no guarding. No hernia.   Musculoskeletal: He exhibits edema (   Bilateral leg edema).   Neurological: He is alert and oriented to person, place, and time. Coordination and gait normal.   Skin: Skin is warm and dry.   Psychiatric: He has a normal mood and affect. His speech is normal and behavior is normal. Judgment and thought content normal.   Nursing note and vitals reviewed.      Assessment/Plan   Diagnoses and all orders for this visit:    Chronic combined systolic and diastolic congestive heart failure (CMS/HCC)    Essential hypertension    Non-insulin dependent type 2 diabetes mellitus (CMS/HCC)    Other orders  -     furosemide (LASIX) 20 MG tablet; Take 1 tab in the morning then another at 2 pm        Chronic combined systolic and diastolic congestive heart failure no wheezing but he does have edema in his legs we will try furosemide twice a day  Hypertension very well controlled we need to be very cautious about over diuresing him and causing orthostasis  NIDDM fairly stable at this time on current medication

## 2019-04-08 ENCOUNTER — TELEPHONE (OUTPATIENT)
Dept: INTERNAL MEDICINE | Facility: CLINIC | Age: 84
End: 2019-04-08

## 2019-04-08 NOTE — TELEPHONE ENCOUNTER
Pt has been taking citalopram 10 mg for 10+ years, he was wondering if this could be increased or switched to an alternative therapy-medication seems to not be as effective and pt is wondering if he has become immune to this.    Please advise

## 2019-04-09 ENCOUNTER — OFFICE VISIT (OUTPATIENT)
Dept: WOUND CARE | Facility: HOSPITAL | Age: 84
End: 2019-04-09

## 2019-04-09 RX ORDER — CITALOPRAM 20 MG/1
20 TABLET ORAL DAILY
Qty: 90 TABLET | Refills: 1 | Status: SHIPPED | OUTPATIENT
Start: 2019-04-09 | End: 2019-08-21 | Stop reason: SDUPTHER

## 2019-04-14 NOTE — PROGRESS NOTES
Subjective   Haris Sweeney is a 89 y.o. male.   He is here today for chronic combined systolic diastolic heart failure along with C. difficile colitis hypertension and skin tear of left forearm  History of Present Illness   He is here today for chronic combined systolic diastolic heart failure which is stable now and C. difficile colitis which hopefully is stable and hypertension which is stable on current medication and skin tear left forearm which is not stable  The following portions of the patient's history were reviewed and updated as appropriate: allergies, current medications, past family history, past medical history, past social history, past surgical history and problem list.    Review of Systems   Gastrointestinal: Negative for diarrhea.   Skin: Positive for wound.   All other systems reviewed and are negative.      Objective   Physical Exam   Constitutional: He is oriented to person, place, and time. He appears well-developed and well-nourished. He is cooperative.   HENT:   Head: Normocephalic and atraumatic.   Right Ear: Hearing, tympanic membrane, external ear and ear canal normal.   Left Ear: Hearing, tympanic membrane, external ear and ear canal normal.   Nose: Nose normal.   Mouth/Throat: Uvula is midline, oropharynx is clear and moist and mucous membranes are normal.   Eyes: Conjunctivae, EOM and lids are normal. Pupils are equal, round, and reactive to light.   Neck: Phonation normal. Neck supple. Carotid bruit is not present.   Cardiovascular: Normal rate, regular rhythm and normal heart sounds. Exam reveals no gallop and no friction rub.   No murmur heard.  Pulmonary/Chest: Effort normal and breath sounds normal. No respiratory distress.   Abdominal: Soft. Bowel sounds are normal. He exhibits no distension and no mass. There is no hepatosplenomegaly. There is no tenderness. There is no rebound and no guarding. No hernia.   Musculoskeletal: He exhibits no edema.   Neurological: He is alert and  oriented to person, place, and time. Coordination and gait normal.   Skin: Skin is warm and dry.        Psychiatric: He has a normal mood and affect. His speech is normal and behavior is normal. Judgment and thought content normal.   Nursing note and vitals reviewed.      Assessment/Plan   Diagnoses and all orders for this visit:    Chronic combined systolic and diastolic congestive heart failure (CMS/[facility])    C. difficile colitis  -     Clostridium Difficile EIA - Stool, Per Rectum  -     Clostridium Difficile EIA - Stool, Per Rectum    Essential hypertension    Skin tear of left forearm without complication, initial encounter  -     Ambulatory Referral to Wound Clinic      Chronic combined systolic diastolic CHF stable on current medication  C. difficile colitis has been stable we will which recheck again today  Hypertension well-controlled on current medication  Skin tear left forearm we will refer to wound clinic

## 2019-04-16 ENCOUNTER — TELEPHONE (OUTPATIENT)
Dept: INTERNAL MEDICINE | Facility: CLINIC | Age: 84
End: 2019-04-16

## 2019-04-16 DIAGNOSIS — R53.1 WEAKNESS GENERALIZED: Primary | ICD-10-CM

## 2019-04-23 ENCOUNTER — OFFICE VISIT (OUTPATIENT)
Dept: WOUND CARE | Facility: HOSPITAL | Age: 84
End: 2019-04-23

## 2019-04-23 PROCEDURE — G0463 HOSPITAL OUTPT CLINIC VISIT: HCPCS

## 2019-04-29 ENCOUNTER — OFFICE VISIT (OUTPATIENT)
Dept: INTERNAL MEDICINE | Facility: CLINIC | Age: 84
End: 2019-04-29

## 2019-04-29 VITALS
BODY MASS INDEX: 28.17 KG/M2 | RESPIRATION RATE: 16 BRPM | TEMPERATURE: 98.4 F | OXYGEN SATURATION: 96 % | HEIGHT: 64 IN | WEIGHT: 165 LBS | HEART RATE: 65 BPM | DIASTOLIC BLOOD PRESSURE: 68 MMHG | SYSTOLIC BLOOD PRESSURE: 101 MMHG

## 2019-04-29 DIAGNOSIS — A04.72 C. DIFFICILE COLITIS: ICD-10-CM

## 2019-04-29 DIAGNOSIS — R60.9 PERIPHERAL EDEMA: Primary | ICD-10-CM

## 2019-04-29 DIAGNOSIS — I50.42 CHRONIC COMBINED SYSTOLIC AND DIASTOLIC CONGESTIVE HEART FAILURE (HCC): ICD-10-CM

## 2019-04-29 PROCEDURE — 99213 OFFICE O/P EST LOW 20 MIN: CPT | Performed by: NURSE PRACTITIONER

## 2019-04-29 NOTE — PROGRESS NOTES
"Subjective   Haris Sweeney is a 89 y.o. male.   DD: Bilateral lower leg swelling     Patient presents for evaluation of bilateral lower extremity pitting edema.  This is an 89-year-old male patient of Dr. Madsen.  He has an upcoming appointment with Dr. Saez to establish care as PCP.  His wife and daughter accompany today and help provide HPI.  This patient has been treated for C. difficile off and on for the past few months.  He most recently began vancomycin tablets on 4/1/2019 for positive C. difficile result.  He is on the last 7-day of this treatment.  He has no longer having diarrhea, and denies any abdominal pain, nausea or vomiting.  He is seeing no blood or mucus in his stool.  He is not having constipation.  He reports that around the second week of his current course of vancomycin, he began to develop bilateral lower extremity pitting edema with some weeping.  He did have Lasix on hand at home, which his cardiologist in the past is told him to take as needed for weight gain and edema.  His daughter states that for the past 3 days, she has had him take 40 mg daily of the Lasix, which has helped \"tremendously.\"  He is having no shortness of breath, fever, chills, palpitations.  He is not having any pain, redness, or heat in the legs.  Patient denies development of any other new issues today.         The following portions of the patient's history were reviewed and updated as appropriate: allergies, current medications, past family history, past medical history, past social history, past surgical history and problem list.    Review of Systems   Constitutional: Negative for activity change, chills, fatigue, fever, unexpected weight gain and unexpected weight loss.   HENT: Negative for congestion, hearing loss, postnasal drip, sinus pressure, sneezing, sore throat and tinnitus.    Eyes: Negative for photophobia, pain and visual disturbance.   Respiratory: Negative for cough, chest tightness, " "shortness of breath and wheezing.    Cardiovascular: Positive for leg swelling. Negative for chest pain and palpitations.   Gastrointestinal: Negative for abdominal distention, abdominal pain, constipation, diarrhea, nausea and vomiting.   Endocrine: Negative for polydipsia, polyphagia and polyuria.   Genitourinary: Negative for dysuria, frequency, hematuria and urgency.   Neurological: Negative for dizziness, weakness, numbness and headache.   All other systems reviewed and are negative.      Objective    /68 (BP Location: Left arm, Patient Position: Sitting, Cuff Size: Adult)   Pulse 65   Temp 98.4 °F (36.9 °C) (Oral)   Resp 16   Ht 162.6 cm (64\")   Wt 74.8 kg (165 lb)   SpO2 96%   BMI 28.32 kg/m²     Physical Exam   Constitutional: He is oriented to person, place, and time. He appears well-developed and well-nourished. No distress.   HENT:   Head: Normocephalic and atraumatic.   Right Ear: External ear normal.   Left Ear: External ear normal.   Nose: Nose normal.   Mouth/Throat: Oropharynx is clear and moist. No oropharyngeal exudate.   Eyes: Conjunctivae and EOM are normal. Pupils are equal, round, and reactive to light. Right eye exhibits no discharge. Left eye exhibits no discharge.   Neck: Normal range of motion. Neck supple. No JVD present. No tracheal deviation present. No thyromegaly present.   Cardiovascular: Normal rate, regular rhythm and intact distal pulses.   2+ bilateral lower extremity pitting edema, no weeping, no erythema, no heat   Pulmonary/Chest: Effort normal and breath sounds normal. No stridor. No respiratory distress. He has no wheezes. He has no rales. He exhibits no tenderness.   Lungs are CTA bilaterally   Abdominal: Soft. Bowel sounds are normal. He exhibits no distension. There is no tenderness.   Bowel sounds active x4 quadrants.  No pain to light or deep palpation x4 quadrants.   Musculoskeletal: Normal range of motion.   Lymphadenopathy:     He has no cervical " adenopathy.   Neurological: He is alert and oriented to person, place, and time.   Skin: Skin is warm and dry. Capillary refill takes less than 2 seconds. He is not diaphoretic. No erythema.   Psychiatric: He has a normal mood and affect. His behavior is normal. Judgment and thought content normal.   Nursing note and vitals reviewed.      Current outpatient and discharge medications have been reconciled for the patient.  Reviewed by: EVELINE Jasso    Assessment/Plan   Diagnoses and all orders for this visit:    Peripheral edema  -     CBC & Differential  -     Comprehensive metabolic panel    C. difficile colitis  -     Clostridium Difficile Toxin, PCR - Stool, Per Rectum; Future    Chronic combined systolic and diastolic congestive heart failure (CMS/HCC)      -Peripheral edema: I would like to check the status of kidney function, will get a CMP today as well as a CBC.  He has been on Lasix 40 mill grams daily for the past 3 days and the swelling has gone down tremendously per his family.  We will have him continue on 20 mg of Lasix daily at this time.  He sees cardiology on 5/7/2019 for follow-up as well.    -C. difficile: He is not having any further diarrhea or abdominal pain.  He is nearing the final week of the vancomycin therapy.  We will recheck a C. difficile in 3 months or sooner if symptoms come back.    -We will contact patient with results of his labs and any further recommendations.  Follow-up if symptoms persist or worsen.  Follow-up routinely with PCP, Dr. Madsen/Se.

## 2019-04-29 NOTE — PATIENT INSTRUCTIONS
Please continue on the Lasix 20 mg daily. Please make sure to see cardiology at your upcoming appointment.

## 2019-04-30 DIAGNOSIS — D64.9 LOW HEMOGLOBIN: Primary | ICD-10-CM

## 2019-04-30 LAB
ALBUMIN SERPL-MCNC: 2.2 G/DL (ref 3.5–5.2)
ALBUMIN/GLOB SERPL: 0.6 G/DL
ALP SERPL-CCNC: 95 U/L (ref 39–117)
ALT SERPL-CCNC: 36 U/L (ref 1–41)
AST SERPL-CCNC: 37 U/L (ref 1–40)
BASOPHILS # BLD AUTO: 0.03 10*3/MM3 (ref 0–0.2)
BASOPHILS NFR BLD AUTO: 0.5 % (ref 0–1.5)
BILIRUB SERPL-MCNC: 0.6 MG/DL (ref 0.2–1.2)
BUN SERPL-MCNC: 24 MG/DL (ref 8–23)
BUN/CREAT SERPL: 40.7 (ref 7–25)
CALCIUM SERPL-MCNC: 8.6 MG/DL (ref 8.6–10.5)
CHLORIDE SERPL-SCNC: 94 MMOL/L (ref 98–107)
CO2 SERPL-SCNC: 34.5 MMOL/L (ref 22–29)
CREAT SERPL-MCNC: 0.59 MG/DL (ref 0.76–1.27)
EOSINOPHIL # BLD AUTO: 0.28 10*3/MM3 (ref 0–0.4)
EOSINOPHIL NFR BLD AUTO: 4.7 % (ref 0.3–6.2)
ERYTHROCYTE [DISTWIDTH] IN BLOOD BY AUTOMATED COUNT: 19.4 % (ref 12.3–15.4)
GLOBULIN SER CALC-MCNC: 3.4 GM/DL
GLUCOSE SERPL-MCNC: 177 MG/DL (ref 65–99)
HCT VFR BLD AUTO: 33.1 % (ref 37.5–51)
HGB BLD-MCNC: 10.6 G/DL (ref 13–17.7)
IMM GRANULOCYTES # BLD AUTO: 0.08 10*3/MM3 (ref 0–0.05)
IMM GRANULOCYTES NFR BLD AUTO: 1.4 % (ref 0–0.5)
LYMPHOCYTES # BLD AUTO: 1.68 10*3/MM3 (ref 0.7–3.1)
LYMPHOCYTES NFR BLD AUTO: 28.4 % (ref 19.6–45.3)
MCH RBC QN AUTO: 33.1 PG (ref 26.6–33)
MCHC RBC AUTO-ENTMCNC: 32 G/DL (ref 31.5–35.7)
MCV RBC AUTO: 103.4 FL (ref 79–97)
MONOCYTES # BLD AUTO: 0.54 10*3/MM3 (ref 0.1–0.9)
MONOCYTES NFR BLD AUTO: 9.1 % (ref 5–12)
NEUTROPHILS # BLD AUTO: 3.31 10*3/MM3 (ref 1.7–7)
NEUTROPHILS NFR BLD AUTO: 55.9 % (ref 42.7–76)
NRBC BLD AUTO-RTO: 0.2 /100 WBC (ref 0–0.2)
PLATELET # BLD AUTO: 220 10*3/MM3 (ref 140–450)
POTASSIUM SERPL-SCNC: 3.5 MMOL/L (ref 3.5–5.2)
PROT SERPL-MCNC: 5.6 G/DL (ref 6–8.5)
RBC # BLD AUTO: 3.2 10*6/MM3 (ref 4.14–5.8)
SODIUM SERPL-SCNC: 136 MMOL/L (ref 136–145)
WBC # BLD AUTO: 5.92 10*3/MM3 (ref 3.4–10.8)

## 2019-04-30 NOTE — PROGRESS NOTES
Please let patient know that his kidney function looks stable. His white blood cell count is normal. Hemoglobin is a bit low at 10.6. I would like to recheck a CBC as well as iron profile in 2 weeks, please make lab apt.

## 2019-05-07 ENCOUNTER — APPOINTMENT (OUTPATIENT)
Dept: WOUND CARE | Facility: HOSPITAL | Age: 84
End: 2019-05-07

## 2019-05-14 ENCOUNTER — RESULTS ENCOUNTER (OUTPATIENT)
Dept: INTERNAL MEDICINE | Facility: CLINIC | Age: 84
End: 2019-05-14

## 2019-05-14 ENCOUNTER — OFFICE VISIT (OUTPATIENT)
Dept: WOUND CARE | Facility: HOSPITAL | Age: 84
End: 2019-05-14

## 2019-05-14 DIAGNOSIS — D64.9 LOW HEMOGLOBIN: ICD-10-CM

## 2019-05-14 PROCEDURE — G0463 HOSPITAL OUTPT CLINIC VISIT: HCPCS

## 2019-06-04 ENCOUNTER — OFFICE VISIT (OUTPATIENT)
Dept: WOUND CARE | Facility: HOSPITAL | Age: 84
End: 2019-06-04

## 2019-06-18 ENCOUNTER — OFFICE VISIT (OUTPATIENT)
Dept: INTERNAL MEDICINE | Facility: CLINIC | Age: 84
End: 2019-06-18

## 2019-06-18 VITALS
BODY MASS INDEX: 23.56 KG/M2 | HEIGHT: 64 IN | OXYGEN SATURATION: 98 % | WEIGHT: 138 LBS | HEART RATE: 80 BPM | SYSTOLIC BLOOD PRESSURE: 120 MMHG | DIASTOLIC BLOOD PRESSURE: 68 MMHG

## 2019-06-18 DIAGNOSIS — A04.72 C. DIFFICILE COLITIS: ICD-10-CM

## 2019-06-18 DIAGNOSIS — I48.0 PAROXYSMAL ATRIAL FIBRILLATION (HCC): Chronic | ICD-10-CM

## 2019-06-18 DIAGNOSIS — E03.9 ACQUIRED HYPOTHYROIDISM: Chronic | ICD-10-CM

## 2019-06-18 DIAGNOSIS — E11.9 NON-INSULIN DEPENDENT TYPE 2 DIABETES MELLITUS (HCC): Chronic | ICD-10-CM

## 2019-06-18 DIAGNOSIS — I50.42 CHRONIC COMBINED SYSTOLIC AND DIASTOLIC CONGESTIVE HEART FAILURE (HCC): Chronic | ICD-10-CM

## 2019-06-18 DIAGNOSIS — I10 ESSENTIAL HYPERTENSION: Primary | ICD-10-CM

## 2019-06-18 DIAGNOSIS — S51.812A SKIN TEAR OF LEFT FOREARM WITHOUT COMPLICATION, INITIAL ENCOUNTER: Chronic | ICD-10-CM

## 2019-06-18 LAB
ALBUMIN SERPL-MCNC: 3.3 G/DL (ref 3.5–5.2)
ALBUMIN/GLOB SERPL: 0.9 G/DL
ALP SERPL-CCNC: 79 U/L (ref 39–117)
ALT SERPL W P-5'-P-CCNC: 12 U/L (ref 1–41)
ANION GAP SERPL CALCULATED.3IONS-SCNC: 10.7 MMOL/L
AST SERPL-CCNC: 17 U/L (ref 1–40)
BASOPHILS # BLD AUTO: 0.02 10*3/MM3 (ref 0–0.2)
BASOPHILS NFR BLD AUTO: 0.2 % (ref 0–1.5)
BILIRUB SERPL-MCNC: 0.6 MG/DL (ref 0.2–1.2)
BUN BLD-MCNC: 31 MG/DL (ref 8–23)
BUN/CREAT SERPL: 49.2 (ref 7–25)
CALCIUM SPEC-SCNC: 9.2 MG/DL (ref 8.2–9.6)
CHLORIDE SERPL-SCNC: 98 MMOL/L (ref 98–107)
CO2 SERPL-SCNC: 28.3 MMOL/L (ref 22–29)
CREAT BLD-MCNC: 0.63 MG/DL (ref 0.76–1.27)
DEPRECATED RDW RBC AUTO: 49.6 FL (ref 37–54)
EOSINOPHIL # BLD AUTO: 0.18 10*3/MM3 (ref 0–0.4)
EOSINOPHIL NFR BLD AUTO: 1.7 % (ref 0.3–6.2)
ERYTHROCYTE [DISTWIDTH] IN BLOOD BY AUTOMATED COUNT: 13.2 % (ref 12.3–15.4)
GFR SERPL CREATININE-BSD FRML MDRD: 120 ML/MIN/1.73
GLOBULIN UR ELPH-MCNC: 3.6 GM/DL
GLUCOSE BLD-MCNC: 149 MG/DL (ref 65–99)
HBA1C MFR BLD: 5 % (ref 4.8–5.6)
HCT VFR BLD AUTO: 36.2 % (ref 37.5–51)
HGB BLD-MCNC: 11.7 G/DL (ref 13–17.7)
LYMPHOCYTES # BLD AUTO: 2.26 10*3/MM3 (ref 0.7–3.1)
LYMPHOCYTES NFR BLD AUTO: 21.5 % (ref 19.6–45.3)
MCH RBC QN AUTO: 34.9 PG (ref 26.6–33)
MCHC RBC AUTO-ENTMCNC: 32.3 G/DL (ref 31.5–35.7)
MCV RBC AUTO: 108.1 FL (ref 79–97)
MONOCYTES # BLD AUTO: 1.22 10*3/MM3 (ref 0.1–0.9)
MONOCYTES NFR BLD AUTO: 11.6 % (ref 5–12)
NEUTROPHILS # BLD AUTO: 6.83 10*3/MM3 (ref 1.7–7)
NEUTROPHILS NFR BLD AUTO: 65 % (ref 42.7–76)
PLATELET # BLD AUTO: 235 10*3/MM3 (ref 140–450)
PMV BLD AUTO: 9.4 FL (ref 6–12)
POTASSIUM BLD-SCNC: 4.3 MMOL/L (ref 3.5–5.2)
PROT SERPL-MCNC: 6.9 G/DL (ref 6–8.5)
RBC # BLD AUTO: 3.35 10*6/MM3 (ref 4.14–5.8)
SODIUM BLD-SCNC: 137 MMOL/L (ref 136–145)
WBC NRBC COR # BLD: 10.51 10*3/MM3 (ref 3.4–10.8)

## 2019-06-18 PROCEDURE — 36415 COLL VENOUS BLD VENIPUNCTURE: CPT | Performed by: INTERNAL MEDICINE

## 2019-06-18 PROCEDURE — 83036 HEMOGLOBIN GLYCOSYLATED A1C: CPT | Performed by: INTERNAL MEDICINE

## 2019-06-18 PROCEDURE — 85025 COMPLETE CBC W/AUTO DIFF WBC: CPT | Performed by: INTERNAL MEDICINE

## 2019-06-18 PROCEDURE — 80053 COMPREHEN METABOLIC PANEL: CPT | Performed by: INTERNAL MEDICINE

## 2019-06-18 PROCEDURE — 99214 OFFICE O/P EST MOD 30 MIN: CPT | Performed by: INTERNAL MEDICINE

## 2019-06-18 NOTE — PROGRESS NOTES
Subjective   Haris Sweeney is a 89 y.o. male.  Resents with chief complaint of chronic coronary artery disease, hypertension, impaired glucose tolerance bordering on non-insulin-dependent diabetes mellitus, hyperlipidemia, gout, benign prostatic hypertrophy, combined diastolic and systolic congestive heart failure is currently stable, status post recent C. difficile infection secondary to taking clindamycin, symptom-free for the last 2 months, unstable gait with some issues with diffuse arthritis and lower extremity weakness, hypothyroidism, and paroxysmal atrial fibrillation on chronic blood thinners here for evaluation and treatment.  Patient has an excellent support system he does not drive any longer his daughter is with him today and all of the safety measures that would be appropriate to help protect this patient are in place at his home including grab bars in his bathroom nonskid pads in his tub and shower night lights to keep him from getting disoriented when he gets up at night and no loose rugs.  I will do a set of labs today pertinent to his current medical issues and will check a stool sample for C. difficile.    History of Present Illness the patient is in fragile but stable condition at this time    The following portions of the patient's history were reviewed and updated as appropriate: allergies, current medications, past family history, past medical history, past social history, past surgical history and problem list.    Review of Systems   Constitutional: Positive for fatigue.   HENT: Negative.    Eyes: Positive for visual disturbance.   Respiratory: Positive for shortness of breath.    Cardiovascular: Positive for palpitations.   Gastrointestinal: Negative.    Endocrine: Negative.    Musculoskeletal: Positive for arthralgias and gait problem.   Skin: Negative.    Allergic/Immunologic: Negative.    Neurological: Positive for weakness.   Hematological: Negative.    Psychiatric/Behavioral:  Negative.        Objective   Physical Exam   Constitutional: He appears well-developed and well-nourished.   HENT:   Head: Normocephalic and atraumatic.   Eyes: Pupils are equal, round, and reactive to light.   Neck: Normal range of motion.   Cardiovascular: Normal rate and regular rhythm.   Murmur heard.  Pulmonary/Chest: Effort normal and breath sounds normal.   Abdominal: Soft. Bowel sounds are normal.   Musculoskeletal:   Diffuse arthritis with a somewhat unstable gait for which the patient is currently undergoing physical therapy for gait training and strengthening with improvement of his balance and reduction of his risk for fall   Neurological: He is alert.   Skin: Skin is warm and dry.   Status post skin tear of his left forearm that is healed well   Nursing note and vitals reviewed.        Assessment/Plan   Haris was seen today for hypertension, hypothyroidism, atrial fibrillation and establish care.    Diagnoses and all orders for this visit:    Essential hypertension  Comments:  well controlled    Chronic combined systolic and diastolic congestive heart failure (CMS/HCC)  Comments:  stable for now    Non-insulin dependent type 2 diabetes mellitus (CMS/HCC)  Comments:  check an CMP and A1c  Orders:  -     Hemoglobin A1c; Future    Acquired hypothyroidism  Comments:  check a TSH    C. difficile colitis  Comments:  check a stool culture  Orders:  -     CBC & Differential; Future  -     Comprehensive Metabolic Panel; Future  -     Clostridium Difficile EIA - Stool, Per Rectum; Future    Paroxysmal atrial fibrillation (CMS/HCC)  Comments:  well controlled    Skin tear of left forearm without complication, initial encounter  Comments:  healing well

## 2019-06-19 LAB
ANISOCYTOSIS BLD QL: NORMAL
HYPOCHROMIA BLD QL: NORMAL
LARGE PLATELETS: NORMAL
MACROCYTES BLD QL SMEAR: NORMAL
SMALL PLATELETS BLD QL SMEAR: ADEQUATE
WBC MORPH BLD: NORMAL

## 2019-06-21 ENCOUNTER — LAB (OUTPATIENT)
Dept: INTERNAL MEDICINE | Facility: CLINIC | Age: 84
End: 2019-06-21

## 2019-06-21 DIAGNOSIS — A04.72 C. DIFFICILE COLITIS: Primary | ICD-10-CM

## 2019-06-22 LAB — C DIFF TOX GENS STL QL NAA+PROBE: POSITIVE

## 2019-07-01 ENCOUNTER — TELEPHONE (OUTPATIENT)
Dept: INTERNAL MEDICINE | Facility: CLINIC | Age: 84
End: 2019-07-01

## 2019-07-01 NOTE — TELEPHONE ENCOUNTER
The lab tests were positive for C. difficile but that would be the case having had a C. difficile infection recently, if he is not symptomatic as he was previously I do not recommend any treatment at this time

## 2019-07-01 NOTE — TELEPHONE ENCOUNTER
Notified patient with detailed message. Asked patient to please let our office know if he is having any symptoms so that Dr Saez can start him on treatment

## 2019-07-01 NOTE — TELEPHONE ENCOUNTER
----- Message from Casie Macias sent at 7/1/2019 10:54 AM EDT -----  Contact: pt - Dr SHERMAN's pt - RE: lab results  Pt calling and would like like results of lab test - C-diff. Could you please call pt to discuss? Please advise. Thanks      Pt # [phone]

## 2019-07-24 DIAGNOSIS — E11.9 NON-INSULIN DEPENDENT TYPE 2 DIABETES MELLITUS (HCC): Primary | ICD-10-CM

## 2019-07-24 RX ORDER — LANCETS 33 GAUGE
EACH MISCELLANEOUS
Qty: 100 EACH | Refills: 3 | Status: SHIPPED | OUTPATIENT
Start: 2019-07-24

## 2019-07-29 ENCOUNTER — RESULTS ENCOUNTER (OUTPATIENT)
Dept: INTERNAL MEDICINE | Facility: CLINIC | Age: 84
End: 2019-07-29

## 2019-07-29 DIAGNOSIS — A04.72 C. DIFFICILE COLITIS: ICD-10-CM

## 2019-08-07 RX ORDER — LEVOTHYROXINE SODIUM 0.1 MG/1
TABLET ORAL
Qty: 90 TABLET | Refills: 1 | OUTPATIENT
Start: 2019-08-07

## 2019-08-09 RX ORDER — LEVOTHYROXINE SODIUM 0.1 MG/1
TABLET ORAL
Qty: 90 TABLET | Refills: 1 | OUTPATIENT
Start: 2019-08-09

## 2019-08-21 RX ORDER — LEVOTHYROXINE SODIUM 0.1 MG/1
100 TABLET ORAL DAILY
Qty: 90 TABLET | Refills: 2 | Status: SHIPPED | OUTPATIENT
Start: 2019-08-21 | End: 2020-01-01

## 2019-08-21 RX ORDER — FINASTERIDE 5 MG/1
5 TABLET, FILM COATED ORAL DAILY
Qty: 90 TABLET | Refills: 2 | Status: SHIPPED | OUTPATIENT
Start: 2019-08-21 | End: 2020-01-01

## 2019-08-21 RX ORDER — GLIMEPIRIDE 1 MG/1
1 TABLET ORAL
Qty: 90 TABLET | Refills: 1 | Status: SHIPPED | OUTPATIENT
Start: 2019-08-21 | End: 2020-01-01

## 2019-08-21 RX ORDER — POTASSIUM CHLORIDE 750 MG/1
10 TABLET, FILM COATED, EXTENDED RELEASE ORAL DAILY
Qty: 90 TABLET | Refills: 2 | Status: SHIPPED | OUTPATIENT
Start: 2019-08-21

## 2019-08-21 RX ORDER — CITALOPRAM 20 MG/1
20 TABLET ORAL DAILY
Qty: 90 TABLET | Refills: 1 | Status: SHIPPED | OUTPATIENT
Start: 2019-08-21 | End: 2020-01-01

## 2019-08-21 RX ORDER — ALLOPURINOL 100 MG/1
100 TABLET ORAL 2 TIMES DAILY
Qty: 180 TABLET | Refills: 3 | Status: SHIPPED | OUTPATIENT
Start: 2019-08-21

## 2019-08-21 RX ORDER — FUROSEMIDE 20 MG/1
20 TABLET ORAL DAILY
Qty: 180 TABLET | Refills: 2 | Status: SHIPPED | OUTPATIENT
Start: 2019-08-21

## 2019-09-26 NOTE — PROGRESS NOTES
Subjective   Haris Sweeney is a 89 y.o. male.  This with a chief complaint of chronic gout, mild depression, hyperlipidemia, benign prostatic hypertrophy, non-insulin-dependent diabetes mellitus, well-controlled hypertension, and paroxysmal atrial fibrillation.  The patient was in good spirits today and is doing very well considering his age and medical issues and I do not have any changes to recommend at this time.    History of Present Illness doing very well overall except for muffled hearing bilaterally    The following portions of the patient's history were reviewed and updated as appropriate: allergies, current medications, past family history, past medical history, past social history, past surgical history and problem list.    Review of Systems   Constitutional: Positive for fatigue.   HENT: Negative.    Eyes: Positive for visual disturbance.   Respiratory: Negative.    Cardiovascular: Negative.    Gastrointestinal: Negative.    Endocrine: Negative.    Genitourinary: Negative.    Musculoskeletal: Positive for arthralgias and gait problem.   Skin: Negative.    Allergic/Immunologic: Negative.    Hematological: Negative.    Psychiatric/Behavioral: Negative.        Objective   Physical Exam   Constitutional: He appears well-developed and well-nourished.   HENT:   Head: Normocephalic and atraumatic.   Bilateral cerumen present   Eyes: Pupils are equal, round, and reactive to light.   Neck: Normal range of motion.   Cardiovascular: Normal rate and regular rhythm.   Murmur heard.  Pulmonary/Chest: Effort normal and breath sounds normal.   Abdominal: Soft. Bowel sounds are normal.   Musculoskeletal:   CTS of both wrists   Neurological: He is alert.   Skin: Skin is warm.   Psychiatric: He has a normal mood and affect.   Nursing note and vitals reviewed.        Assessment/Plan   Haris was seen today for hypothyroidism and hypertension.    Diagnoses and all orders for this visit:    Essential  hypertension  Comments:  well controlled  Orders:  -     Comprehensive metabolic panel; Future  -     Comprehensive metabolic panel    Paroxysmal atrial fibrillation (CMS/[facility])  Comments:  stable for now    Gastroesophageal reflux disease without esophagitis  Comments:  well controlled    Non-insulin dependent type 2 diabetes mellitus (CMS/[facility])  Comments:  check a CMP and an A1c  Orders:  -     Comprehensive metabolic panel; Future  -     Hemoglobin A1c; Future  -     Comprehensive metabolic panel  -     Hemoglobin A1c    Major depressive disorder with single episode, in full remission (CMS/[facility])  Comments:  well controlled    Malignant neoplasm of anterior wall of urinary bladder (CMS/[facility])  Comments:  disease free for now    Acquired hypothyroidism  Comments:  well controlled  Orders:  -     TSH; Future  -     TSH    Unstable gait  Comments:  physical therapy  Orders:  -     Ambulatory Referral to Physical Therapy Evaluate and treat

## 2020-01-01 ENCOUNTER — APPOINTMENT (OUTPATIENT)
Dept: CT IMAGING | Facility: HOSPITAL | Age: 85
End: 2020-01-01

## 2020-01-01 ENCOUNTER — APPOINTMENT (OUTPATIENT)
Dept: GENERAL RADIOLOGY | Facility: HOSPITAL | Age: 85
End: 2020-01-01

## 2020-01-01 ENCOUNTER — APPOINTMENT (OUTPATIENT)
Dept: MRI IMAGING | Facility: HOSPITAL | Age: 85
End: 2020-01-01

## 2020-01-01 ENCOUNTER — TELEPHONE (OUTPATIENT)
Dept: NEUROSURGERY | Facility: CLINIC | Age: 85
End: 2020-01-01

## 2020-01-01 ENCOUNTER — TELEPHONE (OUTPATIENT)
Dept: INTERNAL MEDICINE | Facility: CLINIC | Age: 85
End: 2020-01-01

## 2020-01-01 ENCOUNTER — OFFICE VISIT (OUTPATIENT)
Dept: INTERNAL MEDICINE | Facility: CLINIC | Age: 85
End: 2020-01-01

## 2020-01-01 ENCOUNTER — HOSPITAL ENCOUNTER (INPATIENT)
Facility: HOSPITAL | Age: 85
LOS: 19 days | End: 2020-09-08
Attending: EMERGENCY MEDICINE | Admitting: NEUROLOGICAL SURGERY

## 2020-01-01 ENCOUNTER — ANESTHESIA (OUTPATIENT)
Dept: PERIOP | Facility: HOSPITAL | Age: 85
End: 2020-01-01

## 2020-01-01 ENCOUNTER — ANESTHESIA EVENT (OUTPATIENT)
Dept: PERIOP | Facility: HOSPITAL | Age: 85
End: 2020-01-01

## 2020-01-01 VITALS
SYSTOLIC BLOOD PRESSURE: 134 MMHG | BODY MASS INDEX: 22.88 KG/M2 | HEART RATE: 74 BPM | HEIGHT: 64 IN | WEIGHT: 134 LBS | DIASTOLIC BLOOD PRESSURE: 64 MMHG | OXYGEN SATURATION: 96 %

## 2020-01-01 VITALS
WEIGHT: 145 LBS | SYSTOLIC BLOOD PRESSURE: 124 MMHG | BODY MASS INDEX: 24.75 KG/M2 | DIASTOLIC BLOOD PRESSURE: 82 MMHG | HEIGHT: 64 IN | OXYGEN SATURATION: 98 % | HEART RATE: 78 BPM

## 2020-01-01 VITALS
DIASTOLIC BLOOD PRESSURE: 45 MMHG | RESPIRATION RATE: 12 BRPM | TEMPERATURE: 97.5 F | HEART RATE: 108 BPM | WEIGHT: 155.6 LBS | BODY MASS INDEX: 26.56 KG/M2 | OXYGEN SATURATION: 86 % | SYSTOLIC BLOOD PRESSURE: 72 MMHG | HEIGHT: 64 IN

## 2020-01-01 DIAGNOSIS — E11.9 NON-INSULIN DEPENDENT TYPE 2 DIABETES MELLITUS (HCC): ICD-10-CM
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
## 2020-01-01 DIAGNOSIS — M1A.00X0 IDIOPATHIC CHRONIC GOUT WITHOUT TOPHUS, UNSPECIFIED SITE: ICD-10-CM

## 2020-01-01 DIAGNOSIS — I50.42 CHRONIC COMBINED SYSTOLIC AND DIASTOLIC CONGESTIVE HEART FAILURE (HCC): Primary | ICD-10-CM

## 2020-01-01 DIAGNOSIS — I10 ESSENTIAL HYPERTENSION: ICD-10-CM

## 2020-01-01 DIAGNOSIS — E03.9 ACQUIRED HYPOTHYROIDISM: Chronic | ICD-10-CM

## 2020-01-01 DIAGNOSIS — S32.10XA CLOSED FRACTURE OF SACRUM, UNSPECIFIED PORTION OF SACRUM, INITIAL ENCOUNTER (HCC): ICD-10-CM

## 2020-01-01 DIAGNOSIS — S22.079A CLOSED FRACTURE OF TENTH THORACIC VERTEBRA, UNSPECIFIED FRACTURE MORPHOLOGY, INITIAL ENCOUNTER (HCC): ICD-10-CM

## 2020-01-01 DIAGNOSIS — E03.9 ACQUIRED HYPOTHYROIDISM: ICD-10-CM

## 2020-01-01 DIAGNOSIS — I10 ESSENTIAL HYPERTENSION: Primary | ICD-10-CM

## 2020-01-01 DIAGNOSIS — S06.5XAA SDH (SUBDURAL HEMATOMA) (HCC): Primary | ICD-10-CM

## 2020-01-01 DIAGNOSIS — K21.9 GASTROESOPHAGEAL REFLUX DISEASE WITHOUT ESOPHAGITIS: Chronic | ICD-10-CM

## 2020-01-01 DIAGNOSIS — I50.42 CHRONIC COMBINED SYSTOLIC AND DIASTOLIC CONGESTIVE HEART FAILURE (HCC): Chronic | ICD-10-CM

## 2020-01-01 DIAGNOSIS — I48.0 PAROXYSMAL ATRIAL FIBRILLATION (HCC): Chronic | ICD-10-CM

## 2020-01-01 DIAGNOSIS — S22.43XA CLOSED FRACTURE OF MULTIPLE RIBS OF BOTH SIDES, INITIAL ENCOUNTER: ICD-10-CM

## 2020-01-01 DIAGNOSIS — K21.9 GASTROESOPHAGEAL REFLUX DISEASE WITHOUT ESOPHAGITIS: ICD-10-CM

## 2020-01-01 LAB
ALBUMIN SERPL-MCNC: 2.9 G/DL (ref 3.5–5.2)
ALBUMIN SERPL-MCNC: 2.9 G/DL (ref 3.5–5.2)
ALBUMIN SERPL-MCNC: 3.3 G/DL (ref 3.5–5.2)
ALBUMIN SERPL-MCNC: 3.4 G/DL (ref 3.5–5.2)
ALBUMIN SERPL-MCNC: 3.6 G/DL (ref 3.5–5.2)
ALBUMIN SERPL-MCNC: 3.8 G/DL (ref 3.5–5.2)
ALBUMIN SERPL-MCNC: 3.9 G/DL (ref 3.5–5.2)
ALBUMIN/GLOB SERPL: 0.9 G/DL
ALBUMIN/GLOB SERPL: 1.1 G/DL
ALBUMIN/GLOB SERPL: 1.4 G/DL
ALP SERPL-CCNC: 105 U/L (ref 39–117)
ALP SERPL-CCNC: 66 U/L (ref 39–117)
ALP SERPL-CCNC: 67 U/L (ref 39–117)
ALP SERPL-CCNC: 75 U/L (ref 39–117)
ALP SERPL-CCNC: 85 U/L (ref 39–117)
ALP SERPL-CCNC: 99 U/L (ref 39–117)
ALP SERPL-CCNC: 99 U/L (ref 39–117)
ALT SERPL W P-5'-P-CCNC: 12 U/L (ref 1–41)
ALT SERPL W P-5'-P-CCNC: 15 U/L (ref 1–41)
ALT SERPL W P-5'-P-CCNC: 16 U/L (ref 1–41)
ALT SERPL W P-5'-P-CCNC: 17 U/L (ref 1–41)
ALT SERPL W P-5'-P-CCNC: 17 U/L (ref 1–41)
ALT SERPL W P-5'-P-CCNC: 18 U/L (ref 1–41)
ALT SERPL-CCNC: 15 U/L (ref 1–41)
ANION GAP SERPL CALCULATED.3IONS-SCNC: 10 MMOL/L (ref 5–15)
ANION GAP SERPL CALCULATED.3IONS-SCNC: 10.1 MMOL/L (ref 5–15)
ANION GAP SERPL CALCULATED.3IONS-SCNC: 10.4 MMOL/L (ref 5–15)
ANION GAP SERPL CALCULATED.3IONS-SCNC: 10.7 MMOL/L (ref 5–15)
ANION GAP SERPL CALCULATED.3IONS-SCNC: 11.6 MMOL/L (ref 5–15)
ANION GAP SERPL CALCULATED.3IONS-SCNC: 12.2 MMOL/L (ref 5–15)
ANION GAP SERPL CALCULATED.3IONS-SCNC: 14 MMOL/L (ref 5–15)
ANION GAP SERPL CALCULATED.3IONS-SCNC: 7.2 MMOL/L (ref 5–15)
ANION GAP SERPL CALCULATED.3IONS-SCNC: 7.3 MMOL/L (ref 5–15)
ANION GAP SERPL CALCULATED.3IONS-SCNC: 7.4 MMOL/L (ref 5–15)
ANION GAP SERPL CALCULATED.3IONS-SCNC: 7.5 MMOL/L (ref 5–15)
ANION GAP SERPL CALCULATED.3IONS-SCNC: 7.9 MMOL/L (ref 5–15)
ANION GAP SERPL CALCULATED.3IONS-SCNC: 7.9 MMOL/L (ref 5–15)
ANION GAP SERPL CALCULATED.3IONS-SCNC: 8.1 MMOL/L (ref 5–15)
ANION GAP SERPL CALCULATED.3IONS-SCNC: 8.5 MMOL/L (ref 5–15)
ANION GAP SERPL CALCULATED.3IONS-SCNC: 9 MMOL/L (ref 5–15)
ANION GAP SERPL CALCULATED.3IONS-SCNC: 9.1 MMOL/L (ref 5–15)
ANION GAP SERPL CALCULATED.3IONS-SCNC: 9.4 MMOL/L (ref 5–15)
ANISOCYTOSIS BLD QL: ABNORMAL
APTT PPP: 28.3 SECONDS (ref 22.7–35.4)
AST SERPL-CCNC: 18 U/L (ref 1–40)
AST SERPL-CCNC: 19 U/L (ref 1–40)
AST SERPL-CCNC: 20 U/L (ref 1–40)
AST SERPL-CCNC: 22 U/L (ref 1–40)
AST SERPL-CCNC: 22 U/L (ref 1–40)
AST SERPL-CCNC: 24 U/L (ref 1–40)
AST SERPL-CCNC: 28 U/L (ref 1–40)
B PARAPERT DNA SPEC QL NAA+PROBE: NOT DETECTED
B PERT DNA SPEC QL NAA+PROBE: NOT DETECTED
BASOPHILS # BLD AUTO: 0.02 10*3/MM3 (ref 0–0.2)
BASOPHILS # BLD AUTO: 0.04 10*3/MM3 (ref 0–0.2)
BASOPHILS # BLD AUTO: 0.05 10*3/MM3 (ref 0–0.2)
BASOPHILS # BLD AUTO: 0.06 10*3/MM3 (ref 0–0.2)
BASOPHILS # BLD AUTO: 0.07 10*3/MM3 (ref 0–0.2)
BASOPHILS # BLD AUTO: 0.08 10*3/MM3 (ref 0–0.2)
BASOPHILS NFR BLD AUTO: 0.2 % (ref 0–1.5)
BASOPHILS NFR BLD AUTO: 0.3 % (ref 0–1.5)
BASOPHILS NFR BLD AUTO: 0.4 % (ref 0–1.5)
BASOPHILS NFR BLD AUTO: 0.5 % (ref 0–1.5)
BASOPHILS NFR BLD AUTO: 0.6 % (ref 0–1.5)
BASOPHILS NFR BLD AUTO: 0.7 % (ref 0–1.5)
BILIRUB SERPL-MCNC: 1.1 MG/DL (ref 0–1.2)
BILIRUB SERPL-MCNC: 1.2 MG/DL (ref 0–1.2)
BILIRUB SERPL-MCNC: 1.4 MG/DL (ref 0–1.2)
BILIRUB SERPL-MCNC: 2.2 MG/DL (ref 0–1.2)
BILIRUB SERPL-MCNC: 2.3 MG/DL (ref 0–1.2)
BILIRUB SERPL-MCNC: 2.3 MG/DL (ref 0–1.2)
BILIRUB SERPL-MCNC: 2.6 MG/DL (ref 0–1.2)
BUN SERPL-MCNC: 12 MG/DL (ref 8–23)
BUN SERPL-MCNC: 13 MG/DL (ref 8–23)
BUN SERPL-MCNC: 15 MG/DL (ref 8–23)
BUN SERPL-MCNC: 18 MG/DL (ref 8–23)
BUN SERPL-MCNC: 20 MG/DL (ref 8–23)
BUN SERPL-MCNC: 20 MG/DL (ref 8–23)
BUN SERPL-MCNC: 21 MG/DL (ref 8–23)
BUN SERPL-MCNC: 23 MG/DL (ref 8–23)
BUN SERPL-MCNC: 23 MG/DL (ref 8–23)
BUN SERPL-MCNC: 24 MG/DL (ref 8–23)
BUN SERPL-MCNC: 25 MG/DL (ref 8–23)
BUN SERPL-MCNC: 26 MG/DL (ref 8–23)
BUN SERPL-MCNC: 28 MG/DL (ref 8–23)
BUN SERPL-MCNC: 31 MG/DL (ref 8–23)
BUN SERPL-MCNC: 35 MG/DL (ref 8–23)
BUN SERPL-MCNC: 36 MG/DL (ref 8–23)
BUN SERPL-MCNC: 42 MG/DL (ref 8–23)
BUN/CREAT SERPL: 22.4 (ref 7–25)
BUN/CREAT SERPL: 26.7 (ref 7–25)
BUN/CREAT SERPL: 27.8 (ref 7–25)
BUN/CREAT SERPL: 30 (ref 7–25)
BUN/CREAT SERPL: 32.8 (ref 7–25)
BUN/CREAT SERPL: 32.8 (ref 7–25)
BUN/CREAT SERPL: 36.2 (ref 7–25)
BUN/CREAT SERPL: 37.7 (ref 7–25)
BUN/CREAT SERPL: 39 (ref 7–25)
BUN/CREAT SERPL: 40.7 (ref 7–25)
BUN/CREAT SERPL: 41 (ref 7–25)
BUN/CREAT SERPL: 44.9 (ref 7–25)
BUN/CREAT SERPL: 47 (ref 7–25)
BUN/CREAT SERPL: 49.1 (ref 7–25)
BUN/CREAT SERPL: 50 (ref 7–25)
BUN/CREAT SERPL: 56 (ref 7–25)
BUN/CREAT SERPL: 56.5 (ref 7–25)
BUN/CREAT SERPL: 58.1 (ref 7–25)
BUN/CREAT SERPL: 63.6 (ref 7–25)
BURR CELLS BLD QL SMEAR: ABNORMAL
C PNEUM DNA NPH QL NAA+NON-PROBE: NOT DETECTED
CALCIUM SERPL-MCNC: 9.7 MG/DL (ref 8.2–9.6)
CALCIUM SPEC-SCNC: 7.8 MG/DL (ref 8.2–9.6)
CALCIUM SPEC-SCNC: 8.5 MG/DL (ref 8.2–9.6)
CALCIUM SPEC-SCNC: 8.6 MG/DL (ref 8.2–9.6)
CALCIUM SPEC-SCNC: 8.6 MG/DL (ref 8.2–9.6)
CALCIUM SPEC-SCNC: 8.7 MG/DL (ref 8.2–9.6)
CALCIUM SPEC-SCNC: 8.7 MG/DL (ref 8.2–9.6)
CALCIUM SPEC-SCNC: 8.8 MG/DL (ref 8.2–9.6)
CALCIUM SPEC-SCNC: 8.9 MG/DL (ref 8.2–9.6)
CALCIUM SPEC-SCNC: 8.9 MG/DL (ref 8.2–9.6)
CALCIUM SPEC-SCNC: 9 MG/DL (ref 8.2–9.6)
CALCIUM SPEC-SCNC: 9.1 MG/DL (ref 8.2–9.6)
CALCIUM SPEC-SCNC: 9.2 MG/DL (ref 8.2–9.6)
CALCIUM SPEC-SCNC: 9.5 MG/DL (ref 8.2–9.6)
CALCIUM SPEC-SCNC: 9.9 MG/DL (ref 8.2–9.6)
CHLORIDE SERPL-SCNC: 101 MMOL/L (ref 98–107)
CHLORIDE SERPL-SCNC: 102 MMOL/L (ref 98–107)
CHLORIDE SERPL-SCNC: 102 MMOL/L (ref 98–107)
CHLORIDE SERPL-SCNC: 104 MMOL/L (ref 98–107)
CHLORIDE SERPL-SCNC: 105 MMOL/L (ref 98–107)
CHLORIDE SERPL-SCNC: 106 MMOL/L (ref 98–107)
CHLORIDE SERPL-SCNC: 107 MMOL/L (ref 98–107)
CHLORIDE SERPL-SCNC: 108 MMOL/L (ref 98–107)
CHLORIDE SERPL-SCNC: 96 MMOL/L (ref 98–107)
CHLORIDE SERPL-SCNC: 97 MMOL/L (ref 98–107)
CHLORIDE SERPL-SCNC: 98 MMOL/L (ref 98–107)
CHLORIDE SERPL-SCNC: 98 MMOL/L (ref 98–107)
CHLORIDE SERPL-SCNC: 99 MMOL/L (ref 98–107)
CHOLEST SERPL-MCNC: 137 MG/DL (ref 0–200)
CO2 SERPL-SCNC: 20.3 MMOL/L (ref 22–29)
CO2 SERPL-SCNC: 21 MMOL/L (ref 22–29)
CO2 SERPL-SCNC: 21.8 MMOL/L (ref 22–29)
CO2 SERPL-SCNC: 23 MMOL/L (ref 22–29)
CO2 SERPL-SCNC: 23.4 MMOL/L (ref 22–29)
CO2 SERPL-SCNC: 23.9 MMOL/L (ref 22–29)
CO2 SERPL-SCNC: 24.9 MMOL/L (ref 22–29)
CO2 SERPL-SCNC: 25.1 MMOL/L (ref 22–29)
CO2 SERPL-SCNC: 25.6 MMOL/L (ref 22–29)
CO2 SERPL-SCNC: 26 MMOL/L (ref 22–29)
CO2 SERPL-SCNC: 27.1 MMOL/L (ref 22–29)
CO2 SERPL-SCNC: 27.5 MMOL/L (ref 22–29)
CO2 SERPL-SCNC: 27.9 MMOL/L (ref 22–29)
CO2 SERPL-SCNC: 28.6 MMOL/L (ref 22–29)
CO2 SERPL-SCNC: 28.6 MMOL/L (ref 22–29)
CO2 SERPL-SCNC: 28.8 MMOL/L (ref 22–29)
CO2 SERPL-SCNC: 29.1 MMOL/L (ref 22–29)
CO2 SERPL-SCNC: 29.5 MMOL/L (ref 22–29)
CO2 SERPL-SCNC: 32.7 MMOL/L (ref 22–29)
CREAT SERPL-MCNC: 0.45 MG/DL (ref 0.76–1.27)
CREAT SERPL-MCNC: 0.5 MG/DL (ref 0.76–1.27)
CREAT SERPL-MCNC: 0.53 MG/DL (ref 0.76–1.27)
CREAT SERPL-MCNC: 0.54 MG/DL (ref 0.76–1.27)
CREAT SERPL-MCNC: 0.58 MG/DL (ref 0.76–1.27)
CREAT SERPL-MCNC: 0.58 MG/DL (ref 0.76–1.27)
CREAT SERPL-MCNC: 0.59 MG/DL (ref 0.76–1.27)
CREAT SERPL-MCNC: 0.59 MG/DL (ref 0.76–1.27)
CREAT SERPL-MCNC: 0.6 MG/DL (ref 0.76–1.27)
CREAT SERPL-MCNC: 0.61 MG/DL (ref 0.76–1.27)
CREAT SERPL-MCNC: 0.62 MG/DL (ref 0.76–1.27)
CREAT SERPL-MCNC: 0.66 MG/DL (ref 0.76–1.27)
CREAT SERPL-MCNC: 0.66 MG/DL (ref 0.76–1.27)
CREAT SERPL-MCNC: 0.69 MG/DL (ref 0.76–1.27)
CYTO UR: NORMAL
DEPRECATED RDW RBC AUTO: 45.3 FL (ref 37–54)
DEPRECATED RDW RBC AUTO: 46.3 FL (ref 37–54)
DEPRECATED RDW RBC AUTO: 46.3 FL (ref 37–54)
DEPRECATED RDW RBC AUTO: 47.1 FL (ref 37–54)
DEPRECATED RDW RBC AUTO: 47.6 FL (ref 37–54)
DEPRECATED RDW RBC AUTO: 47.6 FL (ref 37–54)
DEPRECATED RDW RBC AUTO: 47.7 FL (ref 37–54)
DEPRECATED RDW RBC AUTO: 47.8 FL (ref 37–54)
DEPRECATED RDW RBC AUTO: 47.9 FL (ref 37–54)
DEPRECATED RDW RBC AUTO: 48.3 FL (ref 37–54)
DEPRECATED RDW RBC AUTO: 48.4 FL (ref 37–54)
DEPRECATED RDW RBC AUTO: 49.4 FL (ref 37–54)
DEPRECATED RDW RBC AUTO: 49.9 FL (ref 37–54)
DEPRECATED RDW RBC AUTO: 50.2 FL (ref 37–54)
DEPRECATED RDW RBC AUTO: 51.2 FL (ref 37–54)
DEPRECATED RDW RBC AUTO: 51.7 FL (ref 37–54)
DEPRECATED RDW RBC AUTO: 52.6 FL (ref 37–54)
DEPRECATED RDW RBC AUTO: 53.8 FL (ref 37–54)
EOSINOPHIL # BLD AUTO: 0 10*3/MM3 (ref 0–0.4)
EOSINOPHIL # BLD AUTO: 0.01 10*3/MM3 (ref 0–0.4)
EOSINOPHIL # BLD AUTO: 0.01 10*3/MM3 (ref 0–0.4)
EOSINOPHIL # BLD AUTO: 0.04 10*3/MM3 (ref 0–0.4)
EOSINOPHIL # BLD AUTO: 0.04 10*3/MM3 (ref 0–0.4)
EOSINOPHIL # BLD AUTO: 0.06 10*3/MM3 (ref 0–0.4)
EOSINOPHIL # BLD AUTO: 0.15 10*3/MM3 (ref 0–0.4)
EOSINOPHIL # BLD AUTO: 0.19 10*3/MM3 (ref 0–0.4)
EOSINOPHIL # BLD AUTO: 0.34 10*3/MM3 (ref 0–0.4)
EOSINOPHIL # BLD AUTO: 0.4 10*3/MM3 (ref 0–0.4)
EOSINOPHIL # BLD AUTO: 0.41 10*3/MM3 (ref 0–0.4)
EOSINOPHIL # BLD AUTO: 0.54 10*3/MM3 (ref 0–0.4)
EOSINOPHIL # BLD AUTO: 0.55 10*3/MM3 (ref 0–0.4)
EOSINOPHIL # BLD AUTO: 0.63 10*3/MM3 (ref 0–0.4)
EOSINOPHIL # BLD AUTO: 0.69 10*3/MM3 (ref 0–0.4)
EOSINOPHIL # BLD MANUAL: 0.25 10*3/MM3 (ref 0–0.4)
EOSINOPHIL NFR BLD AUTO: 0 % (ref 0.3–6.2)
EOSINOPHIL NFR BLD AUTO: 0.1 % (ref 0.3–6.2)
EOSINOPHIL NFR BLD AUTO: 0.1 % (ref 0.3–6.2)
EOSINOPHIL NFR BLD AUTO: 0.3 % (ref 0.3–6.2)
EOSINOPHIL NFR BLD AUTO: 0.4 % (ref 0.3–6.2)
EOSINOPHIL NFR BLD AUTO: 0.5 % (ref 0.3–6.2)
EOSINOPHIL NFR BLD AUTO: 1.3 % (ref 0.3–6.2)
EOSINOPHIL NFR BLD AUTO: 1.6 % (ref 0.3–6.2)
EOSINOPHIL NFR BLD AUTO: 3.1 % (ref 0.3–6.2)
EOSINOPHIL NFR BLD AUTO: 3.1 % (ref 0.3–6.2)
EOSINOPHIL NFR BLD AUTO: 3.5 % (ref 0.3–6.2)
EOSINOPHIL NFR BLD AUTO: 5.2 % (ref 0.3–6.2)
EOSINOPHIL NFR BLD AUTO: 5.7 % (ref 0.3–6.2)
EOSINOPHIL NFR BLD AUTO: 6.1 % (ref 0.3–6.2)
EOSINOPHIL NFR BLD AUTO: 6.6 % (ref 0.3–6.2)
EOSINOPHIL NFR BLD MANUAL: 2.2 % (ref 0.3–6.2)
ERYTHROCYTE [DISTWIDTH] IN BLOOD BY AUTOMATED COUNT: 13.1 % (ref 12.3–15.4)
ERYTHROCYTE [DISTWIDTH] IN BLOOD BY AUTOMATED COUNT: 13.1 % (ref 12.3–15.4)
ERYTHROCYTE [DISTWIDTH] IN BLOOD BY AUTOMATED COUNT: 13.2 % (ref 12.3–15.4)
ERYTHROCYTE [DISTWIDTH] IN BLOOD BY AUTOMATED COUNT: 13.5 % (ref 12.3–15.4)
ERYTHROCYTE [DISTWIDTH] IN BLOOD BY AUTOMATED COUNT: 13.6 % (ref 12.3–15.4)
ERYTHROCYTE [DISTWIDTH] IN BLOOD BY AUTOMATED COUNT: 13.7 % (ref 12.3–15.4)
ERYTHROCYTE [DISTWIDTH] IN BLOOD BY AUTOMATED COUNT: 13.9 % (ref 12.3–15.4)
ERYTHROCYTE [DISTWIDTH] IN BLOOD BY AUTOMATED COUNT: 14 % (ref 12.3–15.4)
ERYTHROCYTE [DISTWIDTH] IN BLOOD BY AUTOMATED COUNT: 14 % (ref 12.3–15.4)
FLUAV H1 2009 PAND RNA NPH QL NAA+PROBE: NOT DETECTED
FLUAV H1 HA GENE NPH QL NAA+PROBE: NOT DETECTED
FLUAV H3 RNA NPH QL NAA+PROBE: NOT DETECTED
FLUAV SUBTYP SPEC NAA+PROBE: NOT DETECTED
FLUBV RNA ISLT QL NAA+PROBE: NOT DETECTED
GFR SERPL CREATININE-BSD FRML MDRD: 107 ML/MIN/1.73
GFR SERPL CREATININE-BSD FRML MDRD: 113 ML/MIN/1.73
GFR SERPL CREATININE-BSD FRML MDRD: 113 ML/MIN/1.73
GFR SERPL CREATININE-BSD FRML MDRD: 122 ML/MIN/1.73
GFR SERPL CREATININE-BSD FRML MDRD: 124 ML/MIN/1.73
GFR SERPL CREATININE-BSD FRML MDRD: 126 ML/MIN/1.73
GFR SERPL CREATININE-BSD FRML MDRD: 129 ML/MIN/1.73
GFR SERPL CREATININE-BSD FRML MDRD: 129 ML/MIN/1.73
GFR SERPL CREATININE-BSD FRML MDRD: 131 ML/MIN/1.73
GFR SERPL CREATININE-BSD FRML MDRD: 131 ML/MIN/1.73
GFR SERPL CREATININE-BSD FRML MDRD: 143 ML/MIN/1.73
GFR SERPL CREATININE-BSD FRML MDRD: 146 ML/MIN/1.73
GFR SERPL CREATININE-BSD FRML MDRD: >150 ML/MIN/1.73
GFR SERPL CREATININE-BSD FRML MDRD: >150 ML/MIN/1.73
GLOBULIN SER CALC-MCNC: 3.2 GM/DL
GLOBULIN UR ELPH-MCNC: 2.7 GM/DL
GLOBULIN UR ELPH-MCNC: 3 GM/DL
GLOBULIN UR ELPH-MCNC: 3.1 GM/DL
GLOBULIN UR ELPH-MCNC: 3.1 GM/DL
GLOBULIN UR ELPH-MCNC: 3.4 GM/DL
GLOBULIN UR ELPH-MCNC: 3.6 GM/DL
GLUCOSE BLDC GLUCOMTR-MCNC: 100 MG/DL (ref 70–130)
GLUCOSE BLDC GLUCOMTR-MCNC: 102 MG/DL (ref 70–130)
GLUCOSE BLDC GLUCOMTR-MCNC: 103 MG/DL (ref 70–130)
GLUCOSE BLDC GLUCOMTR-MCNC: 105 MG/DL (ref 70–130)
GLUCOSE BLDC GLUCOMTR-MCNC: 108 MG/DL (ref 70–130)
GLUCOSE BLDC GLUCOMTR-MCNC: 110 MG/DL (ref 70–130)
GLUCOSE BLDC GLUCOMTR-MCNC: 110 MG/DL (ref 70–130)
GLUCOSE BLDC GLUCOMTR-MCNC: 111 MG/DL (ref 70–130)
GLUCOSE BLDC GLUCOMTR-MCNC: 112 MG/DL (ref 70–130)
GLUCOSE BLDC GLUCOMTR-MCNC: 114 MG/DL (ref 70–130)
GLUCOSE BLDC GLUCOMTR-MCNC: 117 MG/DL (ref 70–130)
GLUCOSE BLDC GLUCOMTR-MCNC: 118 MG/DL (ref 70–130)
GLUCOSE BLDC GLUCOMTR-MCNC: 119 MG/DL (ref 70–130)
GLUCOSE BLDC GLUCOMTR-MCNC: 119 MG/DL (ref 70–130)
GLUCOSE BLDC GLUCOMTR-MCNC: 121 MG/DL (ref 70–130)
GLUCOSE BLDC GLUCOMTR-MCNC: 125 MG/DL (ref 70–130)
GLUCOSE BLDC GLUCOMTR-MCNC: 126 MG/DL (ref 70–130)
GLUCOSE BLDC GLUCOMTR-MCNC: 127 MG/DL (ref 70–130)
GLUCOSE BLDC GLUCOMTR-MCNC: 128 MG/DL (ref 70–130)
GLUCOSE BLDC GLUCOMTR-MCNC: 131 MG/DL (ref 70–130)
GLUCOSE BLDC GLUCOMTR-MCNC: 133 MG/DL (ref 70–130)
GLUCOSE BLDC GLUCOMTR-MCNC: 135 MG/DL (ref 70–130)
GLUCOSE BLDC GLUCOMTR-MCNC: 135 MG/DL (ref 70–130)
GLUCOSE BLDC GLUCOMTR-MCNC: 137 MG/DL (ref 70–130)
GLUCOSE BLDC GLUCOMTR-MCNC: 140 MG/DL (ref 70–130)
GLUCOSE BLDC GLUCOMTR-MCNC: 149 MG/DL (ref 70–130)
GLUCOSE BLDC GLUCOMTR-MCNC: 153 MG/DL (ref 70–130)
GLUCOSE BLDC GLUCOMTR-MCNC: 155 MG/DL (ref 70–130)
GLUCOSE BLDC GLUCOMTR-MCNC: 156 MG/DL (ref 70–130)
GLUCOSE BLDC GLUCOMTR-MCNC: 158 MG/DL (ref 70–130)
GLUCOSE BLDC GLUCOMTR-MCNC: 160 MG/DL (ref 70–130)
GLUCOSE BLDC GLUCOMTR-MCNC: 161 MG/DL (ref 70–130)
GLUCOSE BLDC GLUCOMTR-MCNC: 162 MG/DL (ref 70–130)
GLUCOSE BLDC GLUCOMTR-MCNC: 166 MG/DL (ref 70–130)
GLUCOSE BLDC GLUCOMTR-MCNC: 167 MG/DL (ref 70–130)
GLUCOSE BLDC GLUCOMTR-MCNC: 172 MG/DL (ref 70–130)
GLUCOSE BLDC GLUCOMTR-MCNC: 177 MG/DL (ref 70–130)
GLUCOSE BLDC GLUCOMTR-MCNC: 183 MG/DL (ref 70–130)
GLUCOSE BLDC GLUCOMTR-MCNC: 197 MG/DL (ref 70–130)
GLUCOSE BLDC GLUCOMTR-MCNC: 207 MG/DL (ref 70–130)
GLUCOSE BLDC GLUCOMTR-MCNC: 207 MG/DL (ref 70–130)
GLUCOSE BLDC GLUCOMTR-MCNC: 210 MG/DL (ref 70–130)
GLUCOSE BLDC GLUCOMTR-MCNC: 219 MG/DL (ref 70–130)
GLUCOSE BLDC GLUCOMTR-MCNC: 223 MG/DL (ref 70–130)
GLUCOSE BLDC GLUCOMTR-MCNC: 226 MG/DL (ref 70–130)
GLUCOSE BLDC GLUCOMTR-MCNC: 24 MG/DL (ref 70–130)
GLUCOSE BLDC GLUCOMTR-MCNC: 241 MG/DL (ref 70–130)
GLUCOSE BLDC GLUCOMTR-MCNC: 251 MG/DL (ref 70–130)
GLUCOSE BLDC GLUCOMTR-MCNC: 252 MG/DL (ref 70–130)
GLUCOSE BLDC GLUCOMTR-MCNC: 254 MG/DL (ref 70–130)
GLUCOSE BLDC GLUCOMTR-MCNC: 255 MG/DL (ref 70–130)
GLUCOSE BLDC GLUCOMTR-MCNC: 267 MG/DL (ref 70–130)
GLUCOSE BLDC GLUCOMTR-MCNC: 294 MG/DL (ref 70–130)
GLUCOSE BLDC GLUCOMTR-MCNC: 295 MG/DL (ref 70–130)
GLUCOSE BLDC GLUCOMTR-MCNC: 66 MG/DL (ref 70–130)
GLUCOSE BLDC GLUCOMTR-MCNC: 68 MG/DL (ref 70–130)
GLUCOSE BLDC GLUCOMTR-MCNC: 70 MG/DL (ref 70–130)
GLUCOSE BLDC GLUCOMTR-MCNC: 72 MG/DL (ref 70–130)
GLUCOSE BLDC GLUCOMTR-MCNC: 76 MG/DL (ref 70–130)
GLUCOSE BLDC GLUCOMTR-MCNC: 77 MG/DL (ref 70–130)
GLUCOSE BLDC GLUCOMTR-MCNC: 95 MG/DL (ref 70–130)
GLUCOSE BLDC GLUCOMTR-MCNC: 96 MG/DL (ref 70–130)
GLUCOSE BLDC GLUCOMTR-MCNC: 97 MG/DL (ref 70–130)
GLUCOSE SERPL-MCNC: 102 MG/DL (ref 65–99)
GLUCOSE SERPL-MCNC: 105 MG/DL (ref 65–99)
GLUCOSE SERPL-MCNC: 111 MG/DL (ref 65–99)
GLUCOSE SERPL-MCNC: 113 MG/DL (ref 65–99)
GLUCOSE SERPL-MCNC: 114 MG/DL (ref 65–99)
GLUCOSE SERPL-MCNC: 115 MG/DL (ref 65–99)
GLUCOSE SERPL-MCNC: 125 MG/DL (ref 65–99)
GLUCOSE SERPL-MCNC: 125 MG/DL (ref 65–99)
GLUCOSE SERPL-MCNC: 132 MG/DL (ref 65–99)
GLUCOSE SERPL-MCNC: 132 MG/DL (ref 65–99)
GLUCOSE SERPL-MCNC: 141 MG/DL (ref 65–99)
GLUCOSE SERPL-MCNC: 153 MG/DL (ref 65–99)
GLUCOSE SERPL-MCNC: 220 MG/DL (ref 65–99)
GLUCOSE SERPL-MCNC: 236 MG/DL (ref 65–99)
GLUCOSE SERPL-MCNC: 253 MG/DL (ref 65–99)
GLUCOSE SERPL-MCNC: 67 MG/DL (ref 65–99)
GLUCOSE SERPL-MCNC: 74 MG/DL (ref 65–99)
GLUCOSE SERPL-MCNC: 80 MG/DL (ref 65–99)
GLUCOSE SERPL-MCNC: 88 MG/DL (ref 65–99)
HADV DNA SPEC NAA+PROBE: NOT DETECTED
HBA1C MFR BLD: 5.3 % (ref 4.8–5.6)
HBA1C MFR BLD: 5.5 % (ref 4.8–5.6)
HCOV 229E RNA SPEC QL NAA+PROBE: NOT DETECTED
HCOV HKU1 RNA SPEC QL NAA+PROBE: NOT DETECTED
HCOV NL63 RNA SPEC QL NAA+PROBE: NOT DETECTED
HCOV OC43 RNA SPEC QL NAA+PROBE: NOT DETECTED
HCT VFR BLD AUTO: 26.2 % (ref 37.5–51)
HCT VFR BLD AUTO: 27.1 % (ref 37.5–51)
HCT VFR BLD AUTO: 27.6 % (ref 37.5–51)
HCT VFR BLD AUTO: 28.6 % (ref 37.5–51)
HCT VFR BLD AUTO: 29.6 % (ref 37.5–51)
HCT VFR BLD AUTO: 30.9 % (ref 37.5–51)
HCT VFR BLD AUTO: 30.9 % (ref 37.5–51)
HCT VFR BLD AUTO: 31 % (ref 37.5–51)
HCT VFR BLD AUTO: 31.2 % (ref 37.5–51)
HCT VFR BLD AUTO: 31.5 % (ref 37.5–51)
HCT VFR BLD AUTO: 31.6 % (ref 37.5–51)
HCT VFR BLD AUTO: 32 % (ref 37.5–51)
HCT VFR BLD AUTO: 32.2 % (ref 37.5–51)
HCT VFR BLD AUTO: 32.7 % (ref 37.5–51)
HCT VFR BLD AUTO: 34.2 % (ref 37.5–51)
HCT VFR BLD AUTO: 35 % (ref 37.5–51)
HCT VFR BLD AUTO: 35.2 % (ref 37.5–51)
HCT VFR BLD AUTO: 36.9 % (ref 37.5–51)
HDLC SERPL-MCNC: 46 MG/DL (ref 40–60)
HGB BLD-MCNC: 10.1 G/DL (ref 13–17.7)
HGB BLD-MCNC: 10.2 G/DL (ref 13–17.7)
HGB BLD-MCNC: 10.4 G/DL (ref 13–17.7)
HGB BLD-MCNC: 10.5 G/DL (ref 13–17.7)
HGB BLD-MCNC: 10.6 G/DL (ref 13–17.7)
HGB BLD-MCNC: 10.8 G/DL (ref 13–17.7)
HGB BLD-MCNC: 10.9 G/DL (ref 13–17.7)
HGB BLD-MCNC: 11.1 G/DL (ref 13–17.7)
HGB BLD-MCNC: 11.6 G/DL (ref 13–17.7)
HGB BLD-MCNC: 11.9 G/DL (ref 13–17.7)
HGB BLD-MCNC: 12.1 G/DL (ref 13–17.7)
HGB BLD-MCNC: 8.6 G/DL (ref 13–17.7)
HGB BLD-MCNC: 9.2 G/DL (ref 13–17.7)
HGB BLD-MCNC: 9.7 G/DL (ref 13–17.7)
HGB BLD-MCNC: 9.8 G/DL (ref 13–17.7)
HGB BLD-MCNC: 9.9 G/DL (ref 13–17.7)
HMPV RNA NPH QL NAA+NON-PROBE: NOT DETECTED
HOLD SPECIMEN: NORMAL
HOLD SPECIMEN: NORMAL
HPIV1 RNA SPEC QL NAA+PROBE: NOT DETECTED
HPIV2 RNA SPEC QL NAA+PROBE: NOT DETECTED
HPIV3 RNA NPH QL NAA+PROBE: NOT DETECTED
HPIV4 P GENE NPH QL NAA+PROBE: NOT DETECTED
IMM GRANULOCYTES # BLD AUTO: 0.06 10*3/MM3 (ref 0–0.05)
IMM GRANULOCYTES # BLD AUTO: 0.07 10*3/MM3 (ref 0–0.05)
IMM GRANULOCYTES # BLD AUTO: 0.1 10*3/MM3 (ref 0–0.05)
IMM GRANULOCYTES # BLD AUTO: 0.11 10*3/MM3 (ref 0–0.05)
IMM GRANULOCYTES # BLD AUTO: 0.12 10*3/MM3 (ref 0–0.05)
IMM GRANULOCYTES # BLD AUTO: 0.12 10*3/MM3 (ref 0–0.05)
IMM GRANULOCYTES # BLD AUTO: 0.13 10*3/MM3 (ref 0–0.05)
IMM GRANULOCYTES # BLD AUTO: 0.15 10*3/MM3 (ref 0–0.05)
IMM GRANULOCYTES # BLD AUTO: 0.15 10*3/MM3 (ref 0–0.05)
IMM GRANULOCYTES # BLD AUTO: 0.16 10*3/MM3 (ref 0–0.05)
IMM GRANULOCYTES # BLD AUTO: 0.17 10*3/MM3 (ref 0–0.05)
IMM GRANULOCYTES # BLD AUTO: 0.2 10*3/MM3 (ref 0–0.05)
IMM GRANULOCYTES # BLD AUTO: 0.21 10*3/MM3 (ref 0–0.05)
IMM GRANULOCYTES NFR BLD AUTO: 0.6 % (ref 0–0.5)
IMM GRANULOCYTES NFR BLD AUTO: 0.6 % (ref 0–0.5)
IMM GRANULOCYTES NFR BLD AUTO: 0.9 % (ref 0–0.5)
IMM GRANULOCYTES NFR BLD AUTO: 0.9 % (ref 0–0.5)
IMM GRANULOCYTES NFR BLD AUTO: 1 % (ref 0–0.5)
IMM GRANULOCYTES NFR BLD AUTO: 1.2 % (ref 0–0.5)
IMM GRANULOCYTES NFR BLD AUTO: 1.3 % (ref 0–0.5)
IMM GRANULOCYTES NFR BLD AUTO: 1.3 % (ref 0–0.5)
IMM GRANULOCYTES NFR BLD AUTO: 1.5 % (ref 0–0.5)
IMM GRANULOCYTES NFR BLD AUTO: 1.6 % (ref 0–0.5)
IMM GRANULOCYTES NFR BLD AUTO: 1.7 % (ref 0–0.5)
INR PPP: 1.46 (ref 0.9–1.1)
LAB AP CASE REPORT: NORMAL
LDLC SERPL CALC-MCNC: 77 MG/DL (ref 0–100)
LDLC/HDLC SERPL: 1.68 {RATIO}
LYMPHOCYTES # BLD AUTO: 0.44 10*3/MM3 (ref 0.7–3.1)
LYMPHOCYTES # BLD AUTO: 0.57 10*3/MM3 (ref 0.7–3.1)
LYMPHOCYTES # BLD AUTO: 0.83 10*3/MM3 (ref 0.7–3.1)
LYMPHOCYTES # BLD AUTO: 0.85 10*3/MM3 (ref 0.7–3.1)
LYMPHOCYTES # BLD AUTO: 0.94 10*3/MM3 (ref 0.7–3.1)
LYMPHOCYTES # BLD AUTO: 0.98 10*3/MM3 (ref 0.7–3.1)
LYMPHOCYTES # BLD AUTO: 0.99 10*3/MM3 (ref 0.7–3.1)
LYMPHOCYTES # BLD AUTO: 1.01 10*3/MM3 (ref 0.7–3.1)
LYMPHOCYTES # BLD AUTO: 1.05 10*3/MM3 (ref 0.7–3.1)
LYMPHOCYTES # BLD AUTO: 1.05 10*3/MM3 (ref 0.7–3.1)
LYMPHOCYTES # BLD AUTO: 1.14 10*3/MM3 (ref 0.7–3.1)
LYMPHOCYTES # BLD AUTO: 1.16 10*3/MM3 (ref 0.7–3.1)
LYMPHOCYTES # BLD AUTO: 1.41 10*3/MM3 (ref 0.7–3.1)
LYMPHOCYTES # BLD AUTO: 1.45 10*3/MM3 (ref 0.7–3.1)
LYMPHOCYTES # BLD AUTO: 1.56 10*3/MM3 (ref 0.7–3.1)
LYMPHOCYTES # BLD MANUAL: 0.25 10*3/MM3 (ref 0.7–3.1)
LYMPHOCYTES NFR BLD AUTO: 10.5 % (ref 19.6–45.3)
LYMPHOCYTES NFR BLD AUTO: 11.8 % (ref 19.6–45.3)
LYMPHOCYTES NFR BLD AUTO: 11.9 % (ref 19.6–45.3)
LYMPHOCYTES NFR BLD AUTO: 14 % (ref 19.6–45.3)
LYMPHOCYTES NFR BLD AUTO: 15 % (ref 19.6–45.3)
LYMPHOCYTES NFR BLD AUTO: 3.7 % (ref 19.6–45.3)
LYMPHOCYTES NFR BLD AUTO: 5 % (ref 19.6–45.3)
LYMPHOCYTES NFR BLD AUTO: 7.4 % (ref 19.6–45.3)
LYMPHOCYTES NFR BLD AUTO: 7.6 % (ref 19.6–45.3)
LYMPHOCYTES NFR BLD AUTO: 8.1 % (ref 19.6–45.3)
LYMPHOCYTES NFR BLD AUTO: 8.1 % (ref 19.6–45.3)
LYMPHOCYTES NFR BLD AUTO: 8.6 % (ref 19.6–45.3)
LYMPHOCYTES NFR BLD AUTO: 9 % (ref 19.6–45.3)
LYMPHOCYTES NFR BLD AUTO: 9.4 % (ref 19.6–45.3)
LYMPHOCYTES NFR BLD AUTO: 9.6 % (ref 19.6–45.3)
LYMPHOCYTES NFR BLD MANUAL: 2.2 % (ref 19.6–45.3)
M PNEUMO IGG SER IA-ACNC: NOT DETECTED
MAGNESIUM SERPL-MCNC: 1.3 MG/DL (ref 1.7–2.3)
MCH RBC QN AUTO: 32.6 PG (ref 26.6–33)
MCH RBC QN AUTO: 32.7 PG (ref 26.6–33)
MCH RBC QN AUTO: 32.8 PG (ref 26.6–33)
MCH RBC QN AUTO: 33.1 PG (ref 26.6–33)
MCH RBC QN AUTO: 33.2 PG (ref 26.6–33)
MCH RBC QN AUTO: 33.3 PG (ref 26.6–33)
MCH RBC QN AUTO: 33.4 PG (ref 26.6–33)
MCH RBC QN AUTO: 33.5 PG (ref 26.6–33)
MCH RBC QN AUTO: 33.7 PG (ref 26.6–33)
MCH RBC QN AUTO: 33.7 PG (ref 26.6–33)
MCH RBC QN AUTO: 34.2 PG (ref 26.6–33)
MCHC RBC AUTO-ENTMCNC: 30.7 G/DL (ref 31.5–35.7)
MCHC RBC AUTO-ENTMCNC: 31.7 G/DL (ref 31.5–35.7)
MCHC RBC AUTO-ENTMCNC: 32.2 G/DL (ref 31.5–35.7)
MCHC RBC AUTO-ENTMCNC: 32.8 G/DL (ref 31.5–35.7)
MCHC RBC AUTO-ENTMCNC: 32.9 G/DL (ref 31.5–35.7)
MCHC RBC AUTO-ENTMCNC: 33 G/DL (ref 31.5–35.7)
MCHC RBC AUTO-ENTMCNC: 33.1 G/DL (ref 31.5–35.7)
MCHC RBC AUTO-ENTMCNC: 33.5 G/DL (ref 31.5–35.7)
MCHC RBC AUTO-ENTMCNC: 33.7 G/DL (ref 31.5–35.7)
MCHC RBC AUTO-ENTMCNC: 33.9 G/DL (ref 31.5–35.7)
MCHC RBC AUTO-ENTMCNC: 33.9 G/DL (ref 31.5–35.7)
MCHC RBC AUTO-ENTMCNC: 34.1 G/DL (ref 31.5–35.7)
MCHC RBC AUTO-ENTMCNC: 34.3 G/DL (ref 31.5–35.7)
MCHC RBC AUTO-ENTMCNC: 34.4 G/DL (ref 31.5–35.7)
MCHC RBC AUTO-ENTMCNC: 35.1 G/DL (ref 31.5–35.7)
MCHC RBC AUTO-ENTMCNC: 35.3 G/DL (ref 31.5–35.7)
MCV RBC AUTO: 100.6 FL (ref 79–97)
MCV RBC AUTO: 100.9 FL (ref 79–97)
MCV RBC AUTO: 101.6 FL (ref 79–97)
MCV RBC AUTO: 102.5 FL (ref 79–97)
MCV RBC AUTO: 103.2 FL (ref 79–97)
MCV RBC AUTO: 106.6 FL (ref 79–97)
MCV RBC AUTO: 94.2 FL (ref 79–97)
MCV RBC AUTO: 97.2 FL (ref 79–97)
MCV RBC AUTO: 97.2 FL (ref 79–97)
MCV RBC AUTO: 97.7 FL (ref 79–97)
MCV RBC AUTO: 98 FL (ref 79–97)
MCV RBC AUTO: 98.3 FL (ref 79–97)
MCV RBC AUTO: 98.4 FL (ref 79–97)
MCV RBC AUTO: 98.8 FL (ref 79–97)
MCV RBC AUTO: 99 FL (ref 79–97)
MCV RBC AUTO: 99.3 FL (ref 79–97)
MCV RBC AUTO: 99.4 FL (ref 79–97)
MCV RBC AUTO: 99.7 FL (ref 79–97)
MONOCYTES # BLD AUTO: 0.43 10*3/MM3 (ref 0.1–0.9)
MONOCYTES # BLD AUTO: 0.72 10*3/MM3 (ref 0.1–0.9)
MONOCYTES # BLD AUTO: 1.05 10*3/MM3 (ref 0.1–0.9)
MONOCYTES # BLD AUTO: 1.16 10*3/MM3 (ref 0.1–0.9)
MONOCYTES # BLD AUTO: 1.17 10*3/MM3 (ref 0.1–0.9)
MONOCYTES # BLD AUTO: 1.23 10*3/MM3 (ref 0.1–0.9)
MONOCYTES # BLD AUTO: 1.35 10*3/MM3 (ref 0.1–0.9)
MONOCYTES # BLD AUTO: 1.37 10*3/MM3 (ref 0.1–0.9)
MONOCYTES # BLD AUTO: 1.39 10*3/MM3 (ref 0.1–0.9)
MONOCYTES # BLD AUTO: 1.43 10*3/MM3 (ref 0.1–0.9)
MONOCYTES # BLD AUTO: 1.43 10*3/MM3 (ref 0.1–0.9)
MONOCYTES # BLD AUTO: 1.51 10*3/MM3 (ref 0.1–0.9)
MONOCYTES # BLD AUTO: 1.65 10*3/MM3 (ref 0.1–0.9)
MONOCYTES # BLD AUTO: 1.77 10*3/MM3 (ref 0.1–0.9)
MONOCYTES # BLD AUTO: 1.77 10*3/MM3 (ref 0.1–0.9)
MONOCYTES NFR BLD AUTO: 10.2 % (ref 5–12)
MONOCYTES NFR BLD AUTO: 10.2 % (ref 5–12)
MONOCYTES NFR BLD AUTO: 10.8 % (ref 5–12)
MONOCYTES NFR BLD AUTO: 12.4 % (ref 5–12)
MONOCYTES NFR BLD AUTO: 12.6 % (ref 5–12)
MONOCYTES NFR BLD AUTO: 12.9 % (ref 5–12)
MONOCYTES NFR BLD AUTO: 13 % (ref 5–12)
MONOCYTES NFR BLD AUTO: 14 % (ref 5–12)
MONOCYTES NFR BLD AUTO: 14.7 % (ref 5–12)
MONOCYTES NFR BLD AUTO: 14.8 % (ref 5–12)
MONOCYTES NFR BLD AUTO: 15.2 % (ref 5–12)
MONOCYTES NFR BLD AUTO: 16.5 % (ref 5–12)
MONOCYTES NFR BLD AUTO: 3.6 % (ref 5–12)
MONOCYTES NFR BLD AUTO: 5.9 % (ref 5–12)
MONOCYTES NFR BLD AUTO: 9 % (ref 5–12)
NEUTROPHILS # BLD AUTO: 10.93 10*3/MM3 (ref 1.7–7)
NEUTROPHILS NFR BLD AUTO: 10.15 10*3/MM3 (ref 1.7–7)
NEUTROPHILS NFR BLD AUTO: 10.84 10*3/MM3 (ref 1.7–7)
NEUTROPHILS NFR BLD AUTO: 5.23 10*3/MM3 (ref 1.7–7)
NEUTROPHILS NFR BLD AUTO: 6.11 10*3/MM3 (ref 1.7–7)
NEUTROPHILS NFR BLD AUTO: 6.77 10*3/MM3 (ref 1.7–7)
NEUTROPHILS NFR BLD AUTO: 6.94 10*3/MM3 (ref 1.7–7)
NEUTROPHILS NFR BLD AUTO: 63.4 % (ref 42.7–76)
NEUTROPHILS NFR BLD AUTO: 64.4 % (ref 42.7–76)
NEUTROPHILS NFR BLD AUTO: 67.7 % (ref 42.7–76)
NEUTROPHILS NFR BLD AUTO: 69.7 % (ref 42.7–76)
NEUTROPHILS NFR BLD AUTO: 7.82 10*3/MM3 (ref 1.7–7)
NEUTROPHILS NFR BLD AUTO: 70.2 % (ref 42.7–76)
NEUTROPHILS NFR BLD AUTO: 72.6 % (ref 42.7–76)
NEUTROPHILS NFR BLD AUTO: 74.3 % (ref 42.7–76)
NEUTROPHILS NFR BLD AUTO: 75.4 % (ref 42.7–76)
NEUTROPHILS NFR BLD AUTO: 75.8 % (ref 42.7–76)
NEUTROPHILS NFR BLD AUTO: 76.2 % (ref 42.7–76)
NEUTROPHILS NFR BLD AUTO: 77.3 % (ref 42.7–76)
NEUTROPHILS NFR BLD AUTO: 78.2 % (ref 42.7–76)
NEUTROPHILS NFR BLD AUTO: 8.33 10*3/MM3 (ref 1.7–7)
NEUTROPHILS NFR BLD AUTO: 8.52 10*3/MM3 (ref 1.7–7)
NEUTROPHILS NFR BLD AUTO: 8.69 10*3/MM3 (ref 1.7–7)
NEUTROPHILS NFR BLD AUTO: 8.75 10*3/MM3 (ref 1.7–7)
NEUTROPHILS NFR BLD AUTO: 81.6 % (ref 42.7–76)
NEUTROPHILS NFR BLD AUTO: 84 % (ref 42.7–76)
NEUTROPHILS NFR BLD AUTO: 9.24 10*3/MM3 (ref 1.7–7)
NEUTROPHILS NFR BLD AUTO: 9.67 10*3/MM3 (ref 1.7–7)
NEUTROPHILS NFR BLD AUTO: 9.9 10*3/MM3 (ref 1.7–7)
NEUTROPHILS NFR BLD AUTO: 9.94 10*3/MM3 (ref 1.7–7)
NEUTROPHILS NFR BLD AUTO: 91 % (ref 42.7–76)
NEUTROPHILS NFR BLD MANUAL: 95.7 % (ref 42.7–76)
NRBC BLD AUTO-RTO: 0.1 /100 WBC (ref 0–0.2)
NRBC BLD AUTO-RTO: 0.2 /100 WBC (ref 0–0.2)
NRBC BLD AUTO-RTO: 0.3 /100 WBC (ref 0–0.2)
NRBC BLD AUTO-RTO: 0.4 /100 WBC (ref 0–0.2)
NRBC BLD AUTO-RTO: 0.4 /100 WBC (ref 0–0.2)
PATH REPORT.FINAL DX SPEC: NORMAL
PATH REPORT.GROSS SPEC: NORMAL
PHENYTOIN SERPL-MCNC: 19.1 MCG/ML (ref 10–20)
PHENYTOIN SERPL-MCNC: 19.8 MCG/ML (ref 10–20)
PHENYTOIN SERPL-MCNC: 20.6 MCG/ML (ref 10–20)
PHENYTOIN SERPL-MCNC: 21.5 MCG/ML (ref 10–20)
PLAT MORPH BLD: NORMAL
PLATELET # BLD AUTO: 154 10*3/MM3 (ref 140–450)
PLATELET # BLD AUTO: 159 10*3/MM3 (ref 140–450)
PLATELET # BLD AUTO: 166 10*3/MM3 (ref 140–450)
PLATELET # BLD AUTO: 170 10*3/MM3 (ref 140–450)
PLATELET # BLD AUTO: 171 10*3/MM3 (ref 140–450)
PLATELET # BLD AUTO: 177 10*3/MM3 (ref 140–450)
PLATELET # BLD AUTO: 183 10*3/MM3 (ref 140–450)
PLATELET # BLD AUTO: 193 10*3/MM3 (ref 140–450)
PLATELET # BLD AUTO: 200 10*3/MM3 (ref 140–450)
PLATELET # BLD AUTO: 203 10*3/MM3 (ref 140–450)
PLATELET # BLD AUTO: 208 10*3/MM3 (ref 140–450)
PLATELET # BLD AUTO: 213 10*3/MM3 (ref 140–450)
PLATELET # BLD AUTO: 214 10*3/MM3 (ref 140–450)
PLATELET # BLD AUTO: 218 10*3/MM3 (ref 140–450)
PLATELET # BLD AUTO: 222 10*3/MM3 (ref 140–450)
PLATELET # BLD AUTO: 235 10*3/MM3 (ref 140–450)
PLATELET # BLD AUTO: 236 10*3/MM3 (ref 140–450)
PLATELET # BLD AUTO: 238 10*3/MM3 (ref 140–450)
PMV BLD AUTO: 10 FL (ref 6–12)
PMV BLD AUTO: 10.1 FL (ref 6–12)
PMV BLD AUTO: 10.2 FL (ref 6–12)
PMV BLD AUTO: 10.3 FL (ref 6–12)
PMV BLD AUTO: 10.3 FL (ref 6–12)
PMV BLD AUTO: 10.5 FL (ref 6–12)
PMV BLD AUTO: 10.5 FL (ref 6–12)
PMV BLD AUTO: 10.6 FL (ref 6–12)
PMV BLD AUTO: 10.7 FL (ref 6–12)
PMV BLD AUTO: 10.9 FL (ref 6–12)
PMV BLD AUTO: 11.3 FL (ref 6–12)
PMV BLD AUTO: 9.4 FL (ref 6–12)
PMV BLD AUTO: 9.9 FL (ref 6–12)
POIKILOCYTOSIS BLD QL SMEAR: ABNORMAL
POTASSIUM SERPL-SCNC: 3.2 MMOL/L (ref 3.5–5.2)
POTASSIUM SERPL-SCNC: 3.4 MMOL/L (ref 3.5–5.2)
POTASSIUM SERPL-SCNC: 3.6 MMOL/L (ref 3.5–5.2)
POTASSIUM SERPL-SCNC: 3.8 MMOL/L (ref 3.5–5.2)
POTASSIUM SERPL-SCNC: 3.9 MMOL/L (ref 3.5–5.2)
POTASSIUM SERPL-SCNC: 4 MMOL/L (ref 3.5–5.2)
POTASSIUM SERPL-SCNC: 4 MMOL/L (ref 3.5–5.2)
POTASSIUM SERPL-SCNC: 4.1 MMOL/L (ref 3.5–5.2)
POTASSIUM SERPL-SCNC: 4.1 MMOL/L (ref 3.5–5.2)
POTASSIUM SERPL-SCNC: 4.2 MMOL/L (ref 3.5–5.2)
POTASSIUM SERPL-SCNC: 4.4 MMOL/L (ref 3.5–5.2)
POTASSIUM SERPL-SCNC: 4.4 MMOL/L (ref 3.5–5.2)
POTASSIUM SERPL-SCNC: 4.5 MMOL/L (ref 3.5–5.2)
PROT SERPL-MCNC: 6 G/DL (ref 6–8.5)
PROT SERPL-MCNC: 6 G/DL (ref 6–8.5)
PROT SERPL-MCNC: 6.3 G/DL (ref 6–8.5)
PROT SERPL-MCNC: 6.6 G/DL (ref 6–8.5)
PROT SERPL-MCNC: 6.8 G/DL (ref 6–8.5)
PROT SERPL-MCNC: 7 G/DL (ref 6–8.5)
PROT SERPL-MCNC: 7.2 G/DL (ref 6–8.5)
PROTHROMBIN TIME: 17.4 SECONDS (ref 11.7–14.2)
RBC # BLD AUTO: 2.58 10*6/MM3 (ref 4.14–5.8)
RBC # BLD AUTO: 2.73 10*6/MM3 (ref 4.14–5.8)
RBC # BLD AUTO: 2.84 10*6/MM3 (ref 4.14–5.8)
RBC # BLD AUTO: 2.91 10*6/MM3 (ref 4.14–5.8)
RBC # BLD AUTO: 3.02 10*6/MM3 (ref 4.14–5.8)
RBC # BLD AUTO: 3.02 10*6/MM3 (ref 4.14–5.8)
RBC # BLD AUTO: 3.13 10*6/MM3 (ref 4.14–5.8)
RBC # BLD AUTO: 3.14 10*6/MM3 (ref 4.14–5.8)
RBC # BLD AUTO: 3.14 10*6/MM3 (ref 4.14–5.8)
RBC # BLD AUTO: 3.16 10*6/MM3 (ref 4.14–5.8)
RBC # BLD AUTO: 3.18 10*6/MM3 (ref 4.14–5.8)
RBC # BLD AUTO: 3.2 10*6/MM3 (ref 4.14–5.8)
RBC # BLD AUTO: 3.24 10*6/MM3 (ref 4.14–5.8)
RBC # BLD AUTO: 3.28 10*6/MM3 (ref 4.14–5.8)
RBC # BLD AUTO: 3.39 10*6/MM3 (ref 4.14–5.8)
RBC # BLD AUTO: 3.5 10*6/MM3 (ref 4.14–5.8)
RBC # BLD AUTO: 3.6 10*6/MM3 (ref 4.14–5.8)
RBC # BLD AUTO: 3.62 10*6/MM3 (ref 4.14–5.8)
RESP PATH DNA+RNA PNL NPH NAA+NON-PROBE: NOT DETECTED
RHINOVIRUS RNA SPEC NAA+PROBE: NOT DETECTED
RSV RNA NPH QL NAA+NON-PROBE: NOT DETECTED
SODIUM SERPL-SCNC: 128 MMOL/L (ref 136–145)
SODIUM SERPL-SCNC: 132 MMOL/L (ref 136–145)
SODIUM SERPL-SCNC: 133 MMOL/L (ref 136–145)
SODIUM SERPL-SCNC: 134 MMOL/L (ref 136–145)
SODIUM SERPL-SCNC: 135 MMOL/L (ref 136–145)
SODIUM SERPL-SCNC: 136 MMOL/L (ref 136–145)
SODIUM SERPL-SCNC: 137 MMOL/L (ref 136–145)
SODIUM SERPL-SCNC: 137 MMOL/L (ref 136–145)
SODIUM SERPL-SCNC: 138 MMOL/L (ref 136–145)
SODIUM SERPL-SCNC: 139 MMOL/L (ref 136–145)
SODIUM SERPL-SCNC: 139 MMOL/L (ref 136–145)
SODIUM SERPL-SCNC: 141 MMOL/L (ref 136–145)
SODIUM SERPL-SCNC: 142 MMOL/L (ref 136–145)
SODIUM SERPL-SCNC: 142 MMOL/L (ref 136–145)
SODIUM SERPL-SCNC: 143 MMOL/L (ref 136–145)
SODIUM SERPL-SCNC: 144 MMOL/L (ref 136–145)
SODIUM SERPL-SCNC: 145 MMOL/L (ref 136–145)
SPHEROCYTES BLD QL SMEAR: ABNORMAL
T4 SERPL-MCNC: 7.28 MCG/DL (ref 4.5–11.7)
TRIGL SERPL-MCNC: 69 MG/DL (ref 0–150)
TROPONIN T SERPL-MCNC: 0.01 NG/ML (ref 0–0.03)
TSH SERPL DL<=0.005 MIU/L-ACNC: 0.17 UIU/ML (ref 0.27–4.2)
VLDLC SERPL-MCNC: 13.8 MG/DL (ref 5–40)
WBC # BLD AUTO: 10.05 10*3/MM3 (ref 3.4–10.8)
WBC # BLD AUTO: 10.25 10*3/MM3 (ref 3.4–10.8)
WBC # BLD AUTO: 10.75 10*3/MM3 (ref 3.4–10.8)
WBC # BLD AUTO: 10.99 10*3/MM3 (ref 3.4–10.8)
WBC # BLD AUTO: 11.18 10*3/MM3 (ref 3.4–10.8)
WBC # BLD AUTO: 11.23 10*3/MM3 (ref 3.4–10.8)
WBC # BLD AUTO: 11.42 10*3/MM3 (ref 3.4–10.8)
WBC # BLD AUTO: 11.5 10*3/MM3 (ref 3.4–10.8)
WBC # BLD AUTO: 11.62 10*3/MM3 (ref 3.4–10.8)
WBC # BLD AUTO: 11.92 10*3/MM3 (ref 3.4–10.8)
WBC # BLD AUTO: 12.14 10*3/MM3 (ref 3.4–10.8)
WBC # BLD AUTO: 12.96 10*3/MM3 (ref 3.4–10.8)
WBC # BLD AUTO: 13.19 10*3/MM3 (ref 3.4–10.8)
WBC # BLD AUTO: 13.38 10*3/MM3 (ref 3.4–10.8)
WBC # BLD AUTO: 15.72 10*3/MM3 (ref 3.4–10.8)
WBC # BLD AUTO: 8.13 10*3/MM3 (ref 3.4–10.8)
WBC # BLD AUTO: 9.65 10*3/MM3 (ref 3.4–10.8)
WBC # BLD AUTO: 9.73 10*3/MM3 (ref 3.4–10.8)
WBC MORPH BLD: NORMAL
WHOLE BLOOD HOLD SPECIMEN: NORMAL
WHOLE BLOOD HOLD SPECIMEN: NORMAL

## 2020-01-01 PROCEDURE — 70553 MRI BRAIN STEM W/O & W/DYE: CPT

## 2020-01-01 PROCEDURE — 25810000003 SODIUM CHLORIDE 0.9 % WITH KCL 20 MEQ 20-0.9 MEQ/L-% SOLUTION: Performed by: NEUROLOGICAL SURGERY

## 2020-01-01 PROCEDURE — 99232 SBSQ HOSP IP/OBS MODERATE 35: CPT | Performed by: PSYCHIATRY & NEUROLOGY

## 2020-01-01 PROCEDURE — 25010000002 IOPAMIDOL 61 % SOLUTION: Performed by: EMERGENCY MEDICINE

## 2020-01-01 PROCEDURE — 97116 GAIT TRAINING THERAPY: CPT

## 2020-01-01 PROCEDURE — 82962 GLUCOSE BLOOD TEST: CPT

## 2020-01-01 PROCEDURE — 70450 CT HEAD/BRAIN W/O DYE: CPT

## 2020-01-01 PROCEDURE — 97110 THERAPEUTIC EXERCISES: CPT

## 2020-01-01 PROCEDURE — 85025 COMPLETE CBC W/AUTO DIFF WBC: CPT | Performed by: INTERNAL MEDICINE

## 2020-01-01 PROCEDURE — 71260 CT THORAX DX C+: CPT

## 2020-01-01 PROCEDURE — 80048 BASIC METABOLIC PNL TOTAL CA: CPT | Performed by: INTERNAL MEDICINE

## 2020-01-01 PROCEDURE — 80053 COMPREHEN METABOLIC PANEL: CPT | Performed by: PHYSICIAN ASSISTANT

## 2020-01-01 PROCEDURE — 83735 ASSAY OF MAGNESIUM: CPT | Performed by: INTERNAL MEDICINE

## 2020-01-01 PROCEDURE — 25010000002 HYDROMORPHONE 1 MG/ML SOLUTION: Performed by: INTERNAL MEDICINE

## 2020-01-01 PROCEDURE — 80061 LIPID PANEL: CPT | Performed by: INTERNAL MEDICINE

## 2020-01-01 PROCEDURE — 80053 COMPREHEN METABOLIC PANEL: CPT | Performed by: INTERNAL MEDICINE

## 2020-01-01 PROCEDURE — 99024 POSTOP FOLLOW-UP VISIT: CPT | Performed by: NURSE PRACTITIONER

## 2020-01-01 PROCEDURE — 97162 PT EVAL MOD COMPLEX 30 MIN: CPT

## 2020-01-01 PROCEDURE — 80185 ASSAY OF PHENYTOIN TOTAL: CPT | Performed by: PSYCHIATRY & NEUROLOGY

## 2020-01-01 PROCEDURE — 97530 THERAPEUTIC ACTIVITIES: CPT

## 2020-01-01 PROCEDURE — 00C40ZZ EXTIRPATION OF MATTER FROM INTRACRANIAL SUBDURAL SPACE, OPEN APPROACH: ICD-10-PCS | Performed by: NEUROLOGICAL SURGERY

## 2020-01-01 PROCEDURE — 63710000001 INSULIN REGULAR HUMAN PER 5 UNITS: Performed by: INTERNAL MEDICINE

## 2020-01-01 PROCEDURE — 80053 COMPREHEN METABOLIC PANEL: CPT | Performed by: NEUROLOGICAL SURGERY

## 2020-01-01 PROCEDURE — 25010000002 MORPHINE PER 10 MG: Performed by: INTERNAL MEDICINE

## 2020-01-01 PROCEDURE — 85730 THROMBOPLASTIN TIME PARTIAL: CPT | Performed by: NEUROLOGICAL SURGERY

## 2020-01-01 PROCEDURE — 92610 EVALUATE SWALLOWING FUNCTION: CPT

## 2020-01-01 PROCEDURE — 99284 EMERGENCY DEPT VISIT MOD MDM: CPT

## 2020-01-01 PROCEDURE — 25010000002 LEVETIRACETAM IN NACL 0.82% 500 MG/100ML SOLUTION: Performed by: PSYCHIATRY & NEUROLOGY

## 2020-01-01 PROCEDURE — 99214 OFFICE O/P EST MOD 30 MIN: CPT | Performed by: INTERNAL MEDICINE

## 2020-01-01 PROCEDURE — 25010000002 FOSPHENYTOIN 100 MG PE/2ML SOLUTION: Performed by: PSYCHIATRY & NEUROLOGY

## 2020-01-01 PROCEDURE — 25010000003 LEVETIRACETAM IN NACL 0.75% 1000 MG/100ML SOLUTION: Performed by: PSYCHIATRY & NEUROLOGY

## 2020-01-01 PROCEDURE — 25010000002 VANCOMYCIN PER 500 MG: Performed by: NEUROLOGICAL SURGERY

## 2020-01-01 PROCEDURE — 0202U NFCT DS 22 TRGT SARS-COV-2: CPT | Performed by: EMERGENCY MEDICINE

## 2020-01-01 PROCEDURE — 25010000002 LEVETIRACETAM IN NACL 0.82% 500 MG/100ML SOLUTION: Performed by: NURSE PRACTITIONER

## 2020-01-01 PROCEDURE — 74230 X-RAY XM SWLNG FUNCJ C+: CPT

## 2020-01-01 PROCEDURE — 25010000002 LEVETIRACETAM IN NACL 0.82% 500 MG/100ML SOLUTION: Performed by: INTERNAL MEDICINE

## 2020-01-01 PROCEDURE — 85025 COMPLETE CBC W/AUTO DIFF WBC: CPT | Performed by: NEUROLOGICAL SURGERY

## 2020-01-01 PROCEDURE — 93005 ELECTROCARDIOGRAM TRACING: CPT | Performed by: PHYSICIAN ASSISTANT

## 2020-01-01 PROCEDURE — 25010000002 EPINEPHRINE PER 0.1 MG: Performed by: NEUROLOGICAL SURGERY

## 2020-01-01 PROCEDURE — 92526 ORAL FUNCTION THERAPY: CPT

## 2020-01-01 PROCEDURE — 80185 ASSAY OF PHENYTOIN TOTAL: CPT | Performed by: INTERNAL MEDICINE

## 2020-01-01 PROCEDURE — 74018 RADEX ABDOMEN 1 VIEW: CPT

## 2020-01-01 PROCEDURE — 25010000002 LORAZEPAM PER 2 MG: Performed by: INTERNAL MEDICINE

## 2020-01-01 PROCEDURE — 92526 ORAL FUNCTION THERAPY: CPT | Performed by: SPEECH-LANGUAGE PATHOLOGIST

## 2020-01-01 PROCEDURE — 80048 BASIC METABOLIC PNL TOTAL CA: CPT | Performed by: NEUROLOGICAL SURGERY

## 2020-01-01 PROCEDURE — 99024 POSTOP FOLLOW-UP VISIT: CPT | Performed by: NEUROLOGICAL SURGERY

## 2020-01-01 PROCEDURE — 92611 MOTION FLUOROSCOPY/SWALLOW: CPT | Performed by: SPEECH-LANGUAGE PATHOLOGIST

## 2020-01-01 PROCEDURE — 85007 BL SMEAR W/DIFF WBC COUNT: CPT | Performed by: INTERNAL MEDICINE

## 2020-01-01 PROCEDURE — 99222 1ST HOSP IP/OBS MODERATE 55: CPT | Performed by: SURGERY

## 2020-01-01 PROCEDURE — 97535 SELF CARE MNGMENT TRAINING: CPT

## 2020-01-01 PROCEDURE — 25010000002 FENTANYL CITRATE (PF) 100 MCG/2ML SOLUTION: Performed by: NURSE ANESTHETIST, CERTIFIED REGISTERED

## 2020-01-01 PROCEDURE — 84484 ASSAY OF TROPONIN QUANT: CPT | Performed by: PHYSICIAN ASSISTANT

## 2020-01-01 PROCEDURE — 25010000002 HYDROMORPHONE PER 4 MG: Performed by: INTERNAL MEDICINE

## 2020-01-01 PROCEDURE — 99221 1ST HOSP IP/OBS SF/LOW 40: CPT | Performed by: NEUROLOGICAL SURGERY

## 2020-01-01 PROCEDURE — 93010 ELECTROCARDIOGRAM REPORT: CPT | Performed by: INTERNAL MEDICINE

## 2020-01-01 PROCEDURE — 25010000002 ONDANSETRON PER 1 MG: Performed by: NURSE ANESTHETIST, CERTIFIED REGISTERED

## 2020-01-01 PROCEDURE — 61312 CRNEC/CRNOT STTL XDRL/SDRL: CPT | Performed by: NEUROLOGICAL SURGERY

## 2020-01-01 PROCEDURE — 85027 COMPLETE CBC AUTOMATED: CPT | Performed by: INTERNAL MEDICINE

## 2020-01-01 PROCEDURE — C1713 ANCHOR/SCREW BN/BN,TIS/BN: HCPCS | Performed by: NEUROLOGICAL SURGERY

## 2020-01-01 PROCEDURE — 88305 TISSUE EXAM BY PATHOLOGIST: CPT | Performed by: NEUROLOGICAL SURGERY

## 2020-01-01 PROCEDURE — 99232 SBSQ HOSP IP/OBS MODERATE 35: CPT | Performed by: STUDENT IN AN ORGANIZED HEALTH CARE EDUCATION/TRAINING PROGRAM

## 2020-01-01 PROCEDURE — 25010000002 VANCOMYCIN 1 G RECONSTITUTED SOLUTION 1 EACH VIAL: Performed by: NEUROLOGICAL SURGERY

## 2020-01-01 PROCEDURE — 99231 SBSQ HOSP IP/OBS SF/LOW 25: CPT | Performed by: PSYCHIATRY & NEUROLOGY

## 2020-01-01 PROCEDURE — C9132 KCENTRA, PER I.U.: HCPCS | Performed by: INTERNAL MEDICINE

## 2020-01-01 PROCEDURE — 99221 1ST HOSP IP/OBS SF/LOW 40: CPT | Performed by: STUDENT IN AN ORGANIZED HEALTH CARE EDUCATION/TRAINING PROGRAM

## 2020-01-01 PROCEDURE — A9575 INJ GADOTERATE MEGLUMI 0.1ML: HCPCS | Performed by: INTERNAL MEDICINE

## 2020-01-01 PROCEDURE — 25010000002 FOSPHENYTOIN 500 MG PE/10ML SOLUTION 10 ML VIAL: Performed by: PSYCHIATRY & NEUROLOGY

## 2020-01-01 PROCEDURE — 63710000001 INSULIN LISPRO (HUMAN) PER 5 UNITS: Performed by: INTERNAL MEDICINE

## 2020-01-01 PROCEDURE — 25010000002 PHENOBARBITAL PER 120 MG: Performed by: PSYCHIATRY & NEUROLOGY

## 2020-01-01 PROCEDURE — 74177 CT ABD & PELVIS W/CONTRAST: CPT

## 2020-01-01 PROCEDURE — 85610 PROTHROMBIN TIME: CPT | Performed by: NEUROLOGICAL SURGERY

## 2020-01-01 PROCEDURE — 25010000002 PROTHROMBIN COMPLEX CONC HUMAN 1000 UNITS KIT: Performed by: INTERNAL MEDICINE

## 2020-01-01 PROCEDURE — 97166 OT EVAL MOD COMPLEX 45 MIN: CPT

## 2020-01-01 PROCEDURE — 36415 COLL VENOUS BLD VENIPUNCTURE: CPT

## 2020-01-01 PROCEDURE — 25010000002 FENTANYL CITRATE (PF) 100 MCG/2ML SOLUTION: Performed by: ANESTHESIOLOGY

## 2020-01-01 PROCEDURE — 97112 NEUROMUSCULAR REEDUCATION: CPT

## 2020-01-01 PROCEDURE — 83036 HEMOGLOBIN GLYCOSYLATED A1C: CPT | Performed by: INTERNAL MEDICINE

## 2020-01-01 PROCEDURE — 25010000002 PROPOFOL 10 MG/ML EMULSION: Performed by: NURSE ANESTHETIST, CERTIFIED REGISTERED

## 2020-01-01 PROCEDURE — 25010000002 GADOTERATE MEGLUMINE 7.5 MMOL/15ML SOLUTION: Performed by: INTERNAL MEDICINE

## 2020-01-01 PROCEDURE — 99223 1ST HOSP IP/OBS HIGH 75: CPT | Performed by: PSYCHIATRY & NEUROLOGY

## 2020-01-01 PROCEDURE — 85025 COMPLETE CBC W/AUTO DIFF WBC: CPT | Performed by: PHYSICIAN ASSISTANT

## 2020-01-01 PROCEDURE — 25010000002 FOSPHENYTOIN 100 MG PE/2ML SOLUTION: Performed by: INTERNAL MEDICINE

## 2020-01-01 DEVICE — SSC BONE WAX
Type: IMPLANTABLE DEVICE | Site: BRAIN | Status: FUNCTIONAL
Brand: SSC BONE WAX

## 2020-01-01 DEVICE — FLOSEAL HEMOSTATIC MATRIX, 5ML
Type: IMPLANTABLE DEVICE | Site: BRAIN | Status: FUNCTIONAL
Brand: FLOSEAL HEMOSTATIC MATRIX

## 2020-01-01 DEVICE — CRANIOFIX 2 TITANIUM CLAMP 11MM
Type: IMPLANTABLE DEVICE | Site: BRAIN | Status: FUNCTIONAL
Brand: CRANIOFIX2

## 2020-01-01 RX ORDER — GADOTERATE MEGLUMINE 376.9 MG/ML
13 INJECTION INTRAVENOUS
Status: COMPLETED | OUTPATIENT
Start: 2020-01-01 | End: 2020-01-01

## 2020-01-01 RX ORDER — SODIUM CHLORIDE, SODIUM LACTATE, POTASSIUM CHLORIDE, CALCIUM CHLORIDE 600; 310; 30; 20 MG/100ML; MG/100ML; MG/100ML; MG/100ML
100 INJECTION, SOLUTION INTRAVENOUS CONTINUOUS
Status: DISCONTINUED | OUTPATIENT
Start: 2020-01-01 | End: 2020-01-01

## 2020-01-01 RX ORDER — LIDOCAINE HYDROCHLORIDE 10 MG/ML
0.5 INJECTION, SOLUTION EPIDURAL; INFILTRATION; INTRACAUDAL; PERINEURAL ONCE AS NEEDED
Status: DISCONTINUED | OUTPATIENT
Start: 2020-01-01 | End: 2020-01-01 | Stop reason: HOSPADM

## 2020-01-01 RX ORDER — LEVETIRACETAM 100 MG/ML
750 SOLUTION ORAL EVERY 12 HOURS SCHEDULED
Status: DISCONTINUED | OUTPATIENT
Start: 2020-01-01 | End: 2020-01-01

## 2020-01-01 RX ORDER — FLUMAZENIL 0.1 MG/ML
0.2 INJECTION INTRAVENOUS AS NEEDED
Status: DISCONTINUED | OUTPATIENT
Start: 2020-01-01 | End: 2020-01-01

## 2020-01-01 RX ORDER — ACETAMINOPHEN 650 MG/1
650 SUPPOSITORY RECTAL EVERY 4 HOURS PRN
Status: DISCONTINUED | OUTPATIENT
Start: 2020-01-01 | End: 2020-01-01 | Stop reason: HOSPADM

## 2020-01-01 RX ORDER — LEVETIRACETAM 5 MG/ML
500 INJECTION INTRAVASCULAR ONCE
Status: COMPLETED | OUTPATIENT
Start: 2020-01-01 | End: 2020-01-01

## 2020-01-01 RX ORDER — PROMETHAZINE HYDROCHLORIDE 25 MG/1
25 TABLET ORAL ONCE AS NEEDED
Status: DISCONTINUED | OUTPATIENT
Start: 2020-01-01 | End: 2020-01-01

## 2020-01-01 RX ORDER — HYDROCODONE BITARTRATE AND ACETAMINOPHEN 7.5; 325 MG/1; MG/1
1 TABLET ORAL ONCE AS NEEDED
Status: DISCONTINUED | OUTPATIENT
Start: 2020-01-01 | End: 2020-01-01

## 2020-01-01 RX ORDER — ROCURONIUM BROMIDE 10 MG/ML
INJECTION, SOLUTION INTRAVENOUS AS NEEDED
Status: DISCONTINUED | OUTPATIENT
Start: 2020-01-01 | End: 2020-01-01 | Stop reason: SURG

## 2020-01-01 RX ORDER — FENTANYL 25 UG/H
1 PATCH TRANSDERMAL
Status: DISCONTINUED | OUTPATIENT
Start: 2020-01-01 | End: 2020-01-01

## 2020-01-01 RX ORDER — NALOXONE HCL 0.4 MG/ML
0.4 VIAL (ML) INJECTION
Status: DISCONTINUED | OUTPATIENT
Start: 2020-01-01 | End: 2020-01-01

## 2020-01-01 RX ORDER — GLIPIZIDE 5 MG/1
2.5 TABLET ORAL
Status: DISCONTINUED | OUTPATIENT
Start: 2020-01-01 | End: 2020-01-01

## 2020-01-01 RX ORDER — SODIUM CHLORIDE 0.9 % (FLUSH) 0.9 %
3-10 SYRINGE (ML) INJECTION AS NEEDED
Status: DISCONTINUED | OUTPATIENT
Start: 2020-01-01 | End: 2020-01-01 | Stop reason: HOSPADM

## 2020-01-01 RX ORDER — CITALOPRAM 10 MG/1
10 TABLET ORAL NIGHTLY
Status: DISCONTINUED | OUTPATIENT
Start: 2020-01-01 | End: 2020-01-01

## 2020-01-01 RX ORDER — PHENOBARBITAL 20 MG/5ML
120 SOLUTION ORAL NIGHTLY
Status: DISCONTINUED | OUTPATIENT
Start: 2020-01-01 | End: 2020-01-01

## 2020-01-01 RX ORDER — PHENYTOIN 125 MG/5ML
100 SUSPENSION ORAL EVERY 8 HOURS
Status: DISCONTINUED | OUTPATIENT
Start: 2020-01-01 | End: 2020-01-01

## 2020-01-01 RX ORDER — FOSPHENYTOIN SODIUM 50 MG/ML
100 INJECTION, SOLUTION INTRAMUSCULAR; INTRAVENOUS EVERY 8 HOURS SCHEDULED
Status: DISCONTINUED | OUTPATIENT
Start: 2020-01-01 | End: 2020-01-01

## 2020-01-01 RX ORDER — LIDOCAINE 50 MG/G
3 PATCH TOPICAL
Status: DISCONTINUED | OUTPATIENT
Start: 2020-01-01 | End: 2020-01-01 | Stop reason: HOSPADM

## 2020-01-01 RX ORDER — MORPHINE SULFATE 20 MG/ML
5 SOLUTION ORAL
Status: DISCONTINUED | OUTPATIENT
Start: 2020-01-01 | End: 2020-01-01 | Stop reason: HOSPADM

## 2020-01-01 RX ORDER — VANCOMYCIN HYDROCHLORIDE 1 G/200ML
1000 INJECTION, SOLUTION INTRAVENOUS EVERY 12 HOURS
Status: COMPLETED | OUTPATIENT
Start: 2020-01-01 | End: 2020-01-01

## 2020-01-01 RX ORDER — MORPHINE SULFATE 20 MG/ML
10 SOLUTION ORAL
Status: DISCONTINUED | OUTPATIENT
Start: 2020-01-01 | End: 2020-01-01 | Stop reason: HOSPADM

## 2020-01-01 RX ORDER — SODIUM CHLORIDE 0.9 % (FLUSH) 0.9 %
10 SYRINGE (ML) INJECTION EVERY 12 HOURS SCHEDULED
Status: DISCONTINUED | OUTPATIENT
Start: 2020-01-01 | End: 2020-01-01 | Stop reason: HOSPADM

## 2020-01-01 RX ORDER — ACETAMINOPHEN 325 MG/1
650 TABLET ORAL EVERY 4 HOURS PRN
Status: DISCONTINUED | OUTPATIENT
Start: 2020-01-01 | End: 2020-01-01 | Stop reason: HOSPADM

## 2020-01-01 RX ORDER — MAGNESIUM SULFATE HEPTAHYDRATE 40 MG/ML
4 INJECTION, SOLUTION INTRAVENOUS AS NEEDED
Status: DISCONTINUED | OUTPATIENT
Start: 2020-01-01 | End: 2020-01-01

## 2020-01-01 RX ORDER — LEVETIRACETAM 500 MG/1
500 TABLET ORAL EVERY 12 HOURS SCHEDULED
Status: DISCONTINUED | OUTPATIENT
Start: 2020-01-01 | End: 2020-01-01

## 2020-01-01 RX ORDER — PROMETHAZINE HYDROCHLORIDE 25 MG/1
6.25 TABLET ORAL EVERY 4 HOURS PRN
Status: DISCONTINUED | OUTPATIENT
Start: 2020-01-01 | End: 2020-01-01 | Stop reason: HOSPADM

## 2020-01-01 RX ORDER — VANCOMYCIN HYDROCHLORIDE 1 G/20ML
1 INJECTION, POWDER, LYOPHILIZED, FOR SOLUTION INTRAVENOUS ONCE
Status: DISCONTINUED | OUTPATIENT
Start: 2020-01-01 | End: 2020-01-01

## 2020-01-01 RX ORDER — SODIUM CHLORIDE, SODIUM ACETATE ANHYDROUS, SODIUM GLUCONATE, POTASSIUM CHLORIDE, AND MAGNESIUM CHLORIDE 526; 222; 502; 37; 30 MG/100ML; MG/100ML; MG/100ML; MG/100ML; MG/100ML
IRRIGANT IRRIGATION AS NEEDED
Status: DISCONTINUED | OUTPATIENT
Start: 2020-01-01 | End: 2020-01-01 | Stop reason: HOSPADM

## 2020-01-01 RX ORDER — ONDANSETRON 2 MG/ML
4 INJECTION INTRAMUSCULAR; INTRAVENOUS EVERY 6 HOURS PRN
Status: DISCONTINUED | OUTPATIENT
Start: 2020-01-01 | End: 2020-01-01 | Stop reason: HOSPADM

## 2020-01-01 RX ORDER — CITALOPRAM 20 MG/1
20 TABLET ORAL DAILY
Status: DISCONTINUED | OUTPATIENT
Start: 2020-01-01 | End: 2020-01-01

## 2020-01-01 RX ORDER — MORPHINE SULFATE 2 MG/ML
2 INJECTION, SOLUTION INTRAMUSCULAR; INTRAVENOUS EVERY 4 HOURS PRN
Status: DISCONTINUED | OUTPATIENT
Start: 2020-01-01 | End: 2020-01-01

## 2020-01-01 RX ORDER — FOSPHENYTOIN SODIUM 50 MG/ML
150 INJECTION, SOLUTION INTRAMUSCULAR; INTRAVENOUS EVERY 12 HOURS SCHEDULED
Status: DISCONTINUED | OUTPATIENT
Start: 2020-01-01 | End: 2020-01-01

## 2020-01-01 RX ORDER — PHENOBARBITAL 20 MG/5ML
120 SOLUTION ORAL ONCE
Status: COMPLETED | OUTPATIENT
Start: 2020-01-01 | End: 2020-01-01

## 2020-01-01 RX ORDER — LORAZEPAM 2 MG/ML
2 INJECTION INTRAMUSCULAR
Status: DISCONTINUED | OUTPATIENT
Start: 2020-01-01 | End: 2020-01-01 | Stop reason: HOSPADM

## 2020-01-01 RX ORDER — LORAZEPAM 2 MG/ML
2 CONCENTRATE ORAL
Status: DISCONTINUED | OUTPATIENT
Start: 2020-01-01 | End: 2020-01-01 | Stop reason: HOSPADM

## 2020-01-01 RX ORDER — LORAZEPAM 2 MG/ML
1 INJECTION INTRAMUSCULAR
Status: DISCONTINUED | OUTPATIENT
Start: 2020-01-01 | End: 2020-01-01 | Stop reason: HOSPADM

## 2020-01-01 RX ORDER — FLUTICASONE PROPIONATE 50 MCG
2 SPRAY, SUSPENSION (ML) NASAL DAILY
Qty: 3 BOTTLE | Refills: 3 | Status: SHIPPED | OUTPATIENT
Start: 2020-01-01

## 2020-01-01 RX ORDER — BISACODYL 10 MG
10 SUPPOSITORY, RECTAL RECTAL DAILY PRN
Status: DISCONTINUED | OUTPATIENT
Start: 2020-01-01 | End: 2020-01-01 | Stop reason: HOSPADM

## 2020-01-01 RX ORDER — MELOXICAM 15 MG/1
15 TABLET ORAL DAILY
Qty: 90 TABLET | Refills: 3 | Status: SHIPPED | OUTPATIENT
Start: 2020-01-01

## 2020-01-01 RX ORDER — DEXTROSE MONOHYDRATE 25 G/50ML
25 INJECTION, SOLUTION INTRAVENOUS
Status: DISCONTINUED | OUTPATIENT
Start: 2020-01-01 | End: 2020-01-01

## 2020-01-01 RX ORDER — SODIUM CHLORIDE, SODIUM LACTATE, POTASSIUM CHLORIDE, CALCIUM CHLORIDE 600; 310; 30; 20 MG/100ML; MG/100ML; MG/100ML; MG/100ML
9 INJECTION, SOLUTION INTRAVENOUS CONTINUOUS
Status: DISCONTINUED | OUTPATIENT
Start: 2020-01-01 | End: 2020-01-01

## 2020-01-01 RX ORDER — LEVETIRACETAM 10 MG/ML
1000 INJECTION INTRAVASCULAR EVERY 6 HOURS
Status: DISCONTINUED | OUTPATIENT
Start: 2020-01-01 | End: 2020-01-01

## 2020-01-01 RX ORDER — SODIUM CHLORIDE 0.9 % (FLUSH) 0.9 %
3 SYRINGE (ML) INJECTION EVERY 12 HOURS SCHEDULED
Status: DISCONTINUED | OUTPATIENT
Start: 2020-01-01 | End: 2020-01-01 | Stop reason: HOSPADM

## 2020-01-01 RX ORDER — LORAZEPAM 2 MG/ML
0.5 INJECTION INTRAMUSCULAR
Status: DISCONTINUED | OUTPATIENT
Start: 2020-01-01 | End: 2020-01-01 | Stop reason: HOSPADM

## 2020-01-01 RX ORDER — LEVETIRACETAM 5 MG/ML
500 INJECTION INTRAVASCULAR EVERY 12 HOURS SCHEDULED
Status: DISCONTINUED | OUTPATIENT
Start: 2020-01-01 | End: 2020-01-01

## 2020-01-01 RX ORDER — DIPHENHYDRAMINE HCL 25 MG
25 CAPSULE ORAL EVERY 6 HOURS PRN
Status: DISCONTINUED | OUTPATIENT
Start: 2020-01-01 | End: 2020-01-01 | Stop reason: HOSPADM

## 2020-01-01 RX ORDER — DIPHENHYDRAMINE HCL 25 MG
25 CAPSULE ORAL
Status: DISCONTINUED | OUTPATIENT
Start: 2020-01-01 | End: 2020-01-01

## 2020-01-01 RX ORDER — HYDROMORPHONE HYDROCHLORIDE 1 MG/ML
0.5 INJECTION, SOLUTION INTRAMUSCULAR; INTRAVENOUS; SUBCUTANEOUS
Status: DISCONTINUED | OUTPATIENT
Start: 2020-01-01 | End: 2020-01-01

## 2020-01-01 RX ORDER — SODIUM CHLORIDE 9 MG/ML
INJECTION, SOLUTION INTRAVENOUS AS NEEDED
Status: DISCONTINUED | OUTPATIENT
Start: 2020-01-01 | End: 2020-01-01 | Stop reason: HOSPADM

## 2020-01-01 RX ORDER — PHENOBARBITAL 20 MG/5ML
60 SOLUTION ORAL NIGHTLY
Status: DISCONTINUED | OUTPATIENT
Start: 2020-01-01 | End: 2020-01-01

## 2020-01-01 RX ORDER — POTASSIUM CHLORIDE 7.45 MG/ML
10 INJECTION INTRAVENOUS
Status: DISCONTINUED | OUTPATIENT
Start: 2020-01-01 | End: 2020-01-01

## 2020-01-01 RX ORDER — FINASTERIDE 5 MG/1
TABLET, FILM COATED ORAL
Qty: 90 TABLET | Refills: 1 | Status: SHIPPED | OUTPATIENT
Start: 2020-01-01

## 2020-01-01 RX ORDER — SODIUM CHLORIDE AND POTASSIUM CHLORIDE 150; 900 MG/100ML; MG/100ML
100 INJECTION, SOLUTION INTRAVENOUS CONTINUOUS
Status: DISCONTINUED | OUTPATIENT
Start: 2020-01-01 | End: 2020-01-01

## 2020-01-01 RX ORDER — EPHEDRINE SULFATE 50 MG/ML
5 INJECTION, SOLUTION INTRAVENOUS ONCE AS NEEDED
Status: DISCONTINUED | OUTPATIENT
Start: 2020-01-01 | End: 2020-01-01

## 2020-01-01 RX ORDER — PROMETHAZINE HYDROCHLORIDE 12.5 MG/1
6.25 SUPPOSITORY RECTAL EVERY 4 HOURS PRN
Status: DISCONTINUED | OUTPATIENT
Start: 2020-01-01 | End: 2020-01-01 | Stop reason: HOSPADM

## 2020-01-01 RX ORDER — HYDROCODONE BITARTRATE AND ACETAMINOPHEN 5; 325 MG/1; MG/1
1 TABLET ORAL EVERY 4 HOURS PRN
Status: DISCONTINUED | OUTPATIENT
Start: 2020-01-01 | End: 2020-01-01

## 2020-01-01 RX ORDER — SODIUM CHLORIDE 0.9 % (FLUSH) 0.9 %
10 SYRINGE (ML) INJECTION AS NEEDED
Status: DISCONTINUED | OUTPATIENT
Start: 2020-01-01 | End: 2020-01-01 | Stop reason: HOSPADM

## 2020-01-01 RX ORDER — LISINOPRIL 5 MG/1
5 TABLET ORAL
Status: DISCONTINUED | OUTPATIENT
Start: 2020-01-01 | End: 2020-01-01

## 2020-01-01 RX ORDER — NICOTINE POLACRILEX 4 MG
15 LOZENGE BUCCAL
Status: DISCONTINUED | OUTPATIENT
Start: 2020-01-01 | End: 2020-01-01

## 2020-01-01 RX ORDER — MORPHINE SULFATE 2 MG/ML
6 INJECTION, SOLUTION INTRAMUSCULAR; INTRAVENOUS
Status: DISCONTINUED | OUTPATIENT
Start: 2020-01-01 | End: 2020-01-01 | Stop reason: HOSPADM

## 2020-01-01 RX ORDER — AMLODIPINE BESYLATE 10 MG/1
10 TABLET ORAL
Status: DISCONTINUED | OUTPATIENT
Start: 2020-01-01 | End: 2020-01-01

## 2020-01-01 RX ORDER — TAMSULOSIN HYDROCHLORIDE 0.4 MG/1
0.4 CAPSULE ORAL DAILY
Status: DISCONTINUED | OUTPATIENT
Start: 2020-01-01 | End: 2020-01-01

## 2020-01-01 RX ORDER — FENTANYL 50 UG/H
1 PATCH TRANSDERMAL
Status: DISCONTINUED | OUTPATIENT
Start: 2020-01-01 | End: 2020-01-01 | Stop reason: HOSPADM

## 2020-01-01 RX ORDER — PHENYTOIN 125 MG/5ML
100 SUSPENSION ORAL EVERY 12 HOURS
Status: DISCONTINUED | OUTPATIENT
Start: 2020-01-01 | End: 2020-01-01

## 2020-01-01 RX ORDER — DIPHENHYDRAMINE HYDROCHLORIDE 50 MG/ML
12.5 INJECTION INTRAMUSCULAR; INTRAVENOUS
Status: DISCONTINUED | OUTPATIENT
Start: 2020-01-01 | End: 2020-01-01

## 2020-01-01 RX ORDER — FINASTERIDE 5 MG/1
5 TABLET, FILM COATED ORAL DAILY
Status: DISCONTINUED | OUTPATIENT
Start: 2020-01-01 | End: 2020-01-01

## 2020-01-01 RX ORDER — DIPHENOXYLATE HYDROCHLORIDE AND ATROPINE SULFATE 2.5; .025 MG/1; MG/1
1 TABLET ORAL
Status: DISCONTINUED | OUTPATIENT
Start: 2020-01-01 | End: 2020-01-01 | Stop reason: HOSPADM

## 2020-01-01 RX ORDER — MAGNESIUM SULFATE HEPTAHYDRATE 40 MG/ML
2 INJECTION, SOLUTION INTRAVENOUS AS NEEDED
Status: DISCONTINUED | OUTPATIENT
Start: 2020-01-01 | End: 2020-01-01

## 2020-01-01 RX ORDER — PROMETHAZINE HYDROCHLORIDE 6.25 MG/5ML
6.25 SYRUP ORAL EVERY 4 HOURS PRN
Status: DISCONTINUED | OUTPATIENT
Start: 2020-01-01 | End: 2020-01-01 | Stop reason: HOSPADM

## 2020-01-01 RX ORDER — MORPHINE SULFATE 2 MG/ML
2 INJECTION, SOLUTION INTRAMUSCULAR; INTRAVENOUS
Status: DISCONTINUED | OUTPATIENT
Start: 2020-01-01 | End: 2020-01-01 | Stop reason: HOSPADM

## 2020-01-01 RX ORDER — VANCOMYCIN HYDROCHLORIDE 1 G/200ML
1000 INJECTION, SOLUTION INTRAVENOUS ONCE
Status: COMPLETED | OUTPATIENT
Start: 2020-01-01 | End: 2020-01-01

## 2020-01-01 RX ORDER — LEVETIRACETAM 10 MG/ML
1000 INJECTION INTRAVASCULAR EVERY 12 HOURS SCHEDULED
Status: DISCONTINUED | OUTPATIENT
Start: 2020-01-01 | End: 2020-01-01

## 2020-01-01 RX ORDER — HYDROCODONE BITARTRATE AND ACETAMINOPHEN 5; 325 MG/1; MG/1
1 TABLET ORAL EVERY 6 HOURS PRN
Status: DISCONTINUED | OUTPATIENT
Start: 2020-01-01 | End: 2020-01-01

## 2020-01-01 RX ORDER — PROMETHAZINE HYDROCHLORIDE 25 MG/ML
12.5 INJECTION, SOLUTION INTRAMUSCULAR; INTRAVENOUS ONCE AS NEEDED
Status: DISCONTINUED | OUTPATIENT
Start: 2020-01-01 | End: 2020-01-01

## 2020-01-01 RX ORDER — LIDOCAINE HYDROCHLORIDE 40 MG/ML
SOLUTION TOPICAL AS NEEDED
Status: DISCONTINUED | OUTPATIENT
Start: 2020-01-01 | End: 2020-01-01 | Stop reason: SURG

## 2020-01-01 RX ORDER — MORPHINE SULFATE 20 MG/ML
20 SOLUTION ORAL
Status: DISCONTINUED | OUTPATIENT
Start: 2020-01-01 | End: 2020-01-01 | Stop reason: HOSPADM

## 2020-01-01 RX ORDER — SCOLOPAMINE TRANSDERMAL SYSTEM 1 MG/1
1 PATCH, EXTENDED RELEASE TRANSDERMAL
Status: DISCONTINUED | OUTPATIENT
Start: 2020-01-01 | End: 2020-01-01 | Stop reason: HOSPADM

## 2020-01-01 RX ORDER — HYDROMORPHONE HYDROCHLORIDE 1 MG/ML
0.5 INJECTION, SOLUTION INTRAMUSCULAR; INTRAVENOUS; SUBCUTANEOUS
Status: DISCONTINUED | OUTPATIENT
Start: 2020-01-01 | End: 2020-01-01 | Stop reason: HOSPADM

## 2020-01-01 RX ORDER — NALOXONE HCL 0.4 MG/ML
0.2 VIAL (ML) INJECTION AS NEEDED
Status: DISCONTINUED | OUTPATIENT
Start: 2020-01-01 | End: 2020-01-01

## 2020-01-01 RX ORDER — TIMOLOL MALEATE 5 MG/ML
1 SOLUTION/ DROPS OPHTHALMIC DAILY
Status: DISCONTINUED | OUTPATIENT
Start: 2020-01-01 | End: 2020-01-01

## 2020-01-01 RX ORDER — ONDANSETRON 2 MG/ML
4 INJECTION INTRAMUSCULAR; INTRAVENOUS ONCE AS NEEDED
Status: DISCONTINUED | OUTPATIENT
Start: 2020-01-01 | End: 2020-01-01

## 2020-01-01 RX ORDER — PHENOBARBITAL SODIUM 65 MG/ML
30 INJECTION INTRAMUSCULAR NIGHTLY
Status: DISCONTINUED | OUTPATIENT
Start: 2020-01-01 | End: 2020-01-01

## 2020-01-01 RX ORDER — HYDRALAZINE HYDROCHLORIDE 20 MG/ML
5 INJECTION INTRAMUSCULAR; INTRAVENOUS
Status: DISCONTINUED | OUTPATIENT
Start: 2020-01-01 | End: 2020-01-01

## 2020-01-01 RX ORDER — PROMETHAZINE HYDROCHLORIDE 25 MG/ML
6.25 INJECTION, SOLUTION INTRAMUSCULAR; INTRAVENOUS
Status: DISCONTINUED | OUTPATIENT
Start: 2020-01-01 | End: 2020-01-01

## 2020-01-01 RX ORDER — GLIMEPIRIDE 1 MG/1
TABLET ORAL
Qty: 90 TABLET | Refills: 1 | Status: SHIPPED | OUTPATIENT
Start: 2020-01-01

## 2020-01-01 RX ORDER — FOSPHENYTOIN SODIUM 50 MG/ML
150 INJECTION, SOLUTION INTRAMUSCULAR; INTRAVENOUS EVERY 8 HOURS SCHEDULED
Status: DISCONTINUED | OUTPATIENT
Start: 2020-01-01 | End: 2020-01-01

## 2020-01-01 RX ORDER — PROMETHAZINE HYDROCHLORIDE 25 MG/1
12.5 TABLET ORAL EVERY 4 HOURS PRN
Status: DISCONTINUED | OUTPATIENT
Start: 2020-01-01 | End: 2020-01-01 | Stop reason: HOSPADM

## 2020-01-01 RX ORDER — PROMETHAZINE HYDROCHLORIDE 12.5 MG/1
12.5 SUPPOSITORY RECTAL EVERY 4 HOURS PRN
Status: DISCONTINUED | OUTPATIENT
Start: 2020-01-01 | End: 2020-01-01 | Stop reason: HOSPADM

## 2020-01-01 RX ORDER — PROPOFOL 10 MG/ML
VIAL (ML) INTRAVENOUS AS NEEDED
Status: DISCONTINUED | OUTPATIENT
Start: 2020-01-01 | End: 2020-01-01 | Stop reason: SURG

## 2020-01-01 RX ORDER — FENTANYL CITRATE 50 UG/ML
50 INJECTION, SOLUTION INTRAMUSCULAR; INTRAVENOUS
Status: DISCONTINUED | OUTPATIENT
Start: 2020-01-01 | End: 2020-01-01

## 2020-01-01 RX ORDER — LORAZEPAM 2 MG/ML
1 CONCENTRATE ORAL
Status: DISCONTINUED | OUTPATIENT
Start: 2020-01-01 | End: 2020-01-01 | Stop reason: HOSPADM

## 2020-01-01 RX ORDER — LABETALOL HYDROCHLORIDE 5 MG/ML
5 INJECTION, SOLUTION INTRAVENOUS
Status: DISCONTINUED | OUTPATIENT
Start: 2020-01-01 | End: 2020-01-01

## 2020-01-01 RX ORDER — POTASSIUM CHLORIDE 1.5 G/1.77G
40 POWDER, FOR SOLUTION ORAL AS NEEDED
Status: DISCONTINUED | OUTPATIENT
Start: 2020-01-01 | End: 2020-01-01

## 2020-01-01 RX ORDER — CITALOPRAM 20 MG/1
20 TABLET ORAL NIGHTLY
Status: DISCONTINUED | OUTPATIENT
Start: 2020-01-01 | End: 2020-01-01

## 2020-01-01 RX ORDER — ONDANSETRON 2 MG/ML
INJECTION INTRAMUSCULAR; INTRAVENOUS AS NEEDED
Status: DISCONTINUED | OUTPATIENT
Start: 2020-01-01 | End: 2020-01-01 | Stop reason: SURG

## 2020-01-01 RX ORDER — FAMOTIDINE 10 MG/ML
20 INJECTION, SOLUTION INTRAVENOUS ONCE
Status: COMPLETED | OUTPATIENT
Start: 2020-01-01 | End: 2020-01-01

## 2020-01-01 RX ORDER — POTASSIUM CHLORIDE 750 MG/1
40 CAPSULE, EXTENDED RELEASE ORAL AS NEEDED
Status: DISCONTINUED | OUTPATIENT
Start: 2020-01-01 | End: 2020-01-01

## 2020-01-01 RX ORDER — FUROSEMIDE 20 MG/1
20 TABLET ORAL 2 TIMES DAILY
Status: DISCONTINUED | OUTPATIENT
Start: 2020-01-01 | End: 2020-01-01

## 2020-01-01 RX ORDER — MORPHINE SULFATE 2 MG/ML
4 INJECTION, SOLUTION INTRAMUSCULAR; INTRAVENOUS
Status: DISCONTINUED | OUTPATIENT
Start: 2020-01-01 | End: 2020-01-01 | Stop reason: HOSPADM

## 2020-01-01 RX ORDER — LATANOPROST 50 UG/ML
1 SOLUTION/ DROPS OPHTHALMIC DAILY
Status: DISCONTINUED | OUTPATIENT
Start: 2020-01-01 | End: 2020-01-01

## 2020-01-01 RX ORDER — LEVETIRACETAM 100 MG/ML
1000 SOLUTION ORAL EVERY 12 HOURS SCHEDULED
Status: DISCONTINUED | OUTPATIENT
Start: 2020-01-01 | End: 2020-01-01

## 2020-01-01 RX ORDER — FENTANYL CITRATE 50 UG/ML
INJECTION, SOLUTION INTRAMUSCULAR; INTRAVENOUS AS NEEDED
Status: DISCONTINUED | OUTPATIENT
Start: 2020-01-01 | End: 2020-01-01 | Stop reason: SURG

## 2020-01-01 RX ORDER — LEVOTHYROXINE SODIUM 0.12 MG/1
125 TABLET ORAL
Status: DISCONTINUED | OUTPATIENT
Start: 2020-01-01 | End: 2020-01-01

## 2020-01-01 RX ORDER — CITALOPRAM 20 MG/1
TABLET ORAL
Qty: 90 TABLET | Refills: 1 | Status: SHIPPED | OUTPATIENT
Start: 2020-01-01

## 2020-01-01 RX ORDER — FINASTERIDE 5 MG/1
5 TABLET, FILM COATED ORAL NIGHTLY
Status: DISCONTINUED | OUTPATIENT
Start: 2020-01-01 | End: 2020-01-01

## 2020-01-01 RX ORDER — LIDOCAINE HYDROCHLORIDE 20 MG/ML
INJECTION, SOLUTION INFILTRATION; PERINEURAL AS NEEDED
Status: DISCONTINUED | OUTPATIENT
Start: 2020-01-01 | End: 2020-01-01 | Stop reason: SURG

## 2020-01-01 RX ORDER — LORAZEPAM 2 MG/ML
0.5 CONCENTRATE ORAL
Status: DISCONTINUED | OUTPATIENT
Start: 2020-01-01 | End: 2020-01-01 | Stop reason: HOSPADM

## 2020-01-01 RX ORDER — ONDANSETRON 4 MG/1
4 TABLET, FILM COATED ORAL EVERY 6 HOURS PRN
Status: DISCONTINUED | OUTPATIENT
Start: 2020-01-01 | End: 2020-01-01 | Stop reason: HOSPADM

## 2020-01-01 RX ORDER — ACETAMINOPHEN 160 MG/5ML
650 SOLUTION ORAL EVERY 4 HOURS PRN
Status: DISCONTINUED | OUTPATIENT
Start: 2020-01-01 | End: 2020-01-01 | Stop reason: HOSPADM

## 2020-01-01 RX ORDER — DIPHENHYDRAMINE HYDROCHLORIDE 50 MG/ML
25 INJECTION INTRAMUSCULAR; INTRAVENOUS EVERY 6 HOURS PRN
Status: DISCONTINUED | OUTPATIENT
Start: 2020-01-01 | End: 2020-01-01 | Stop reason: HOSPADM

## 2020-01-01 RX ORDER — OXYCODONE AND ACETAMINOPHEN 7.5; 325 MG/1; MG/1
1 TABLET ORAL ONCE AS NEEDED
Status: DISCONTINUED | OUTPATIENT
Start: 2020-01-01 | End: 2020-01-01

## 2020-01-01 RX ORDER — LEVOTHYROXINE SODIUM 125 MCG
125 TABLET ORAL DAILY
Qty: 90 TABLET | Refills: 3 | Status: SHIPPED | OUTPATIENT
Start: 2020-01-01

## 2020-01-01 RX ORDER — PROMETHAZINE HYDROCHLORIDE 25 MG/1
25 SUPPOSITORY RECTAL ONCE AS NEEDED
Status: DISCONTINUED | OUTPATIENT
Start: 2020-01-01 | End: 2020-01-01

## 2020-01-01 RX ORDER — FENTANYL CITRATE 50 UG/ML
50 INJECTION, SOLUTION INTRAMUSCULAR; INTRAVENOUS
Status: DISCONTINUED | OUTPATIENT
Start: 2020-01-01 | End: 2020-01-01 | Stop reason: HOSPADM

## 2020-01-01 RX ORDER — PROMETHAZINE HYDROCHLORIDE 6.25 MG/5ML
12.5 SYRUP ORAL EVERY 4 HOURS PRN
Status: DISCONTINUED | OUTPATIENT
Start: 2020-01-01 | End: 2020-01-01 | Stop reason: HOSPADM

## 2020-01-01 RX ADMIN — TAMSULOSIN HYDROCHLORIDE 0.4 MG: 0.4 CAPSULE ORAL at 08:25

## 2020-01-01 RX ADMIN — Medication 3193 UNITS: at 15:27

## 2020-01-01 RX ADMIN — LATANOPROST 1 DROP: 50 SOLUTION/ DROPS OPHTHALMIC at 21:58

## 2020-01-01 RX ADMIN — LEVETIRACETAM 500 MG: 5 INJECTION INTRAVENOUS at 09:31

## 2020-01-01 RX ADMIN — INSULIN HUMAN 4 UNITS: 100 INJECTION, SOLUTION PARENTERAL at 02:47

## 2020-01-01 RX ADMIN — SODIUM CHLORIDE, PRESERVATIVE FREE 10 ML: 5 INJECTION INTRAVENOUS at 08:20

## 2020-01-01 RX ADMIN — ACETAMINOPHEN 650 MG: 325 TABLET, FILM COATED ORAL at 02:47

## 2020-01-01 RX ADMIN — FOSPHENYTOIN SODIUM 150 MG PE: 50 INJECTION, SOLUTION INTRAMUSCULAR; INTRAVENOUS at 06:26

## 2020-01-01 RX ADMIN — CITALOPRAM 10 MG: 20 TABLET, FILM COATED ORAL at 21:56

## 2020-01-01 RX ADMIN — LEVOTHYROXINE SODIUM 125 MCG: 125 TABLET ORAL at 06:03

## 2020-01-01 RX ADMIN — LEVETIRACETAM 1000 MG: 10 INJECTION INTRAVENOUS at 21:20

## 2020-01-01 RX ADMIN — PROPOFOL 25 MG: 10 INJECTION, EMULSION INTRAVENOUS at 13:20

## 2020-01-01 RX ADMIN — VANCOMYCIN HYDROCHLORIDE 1000 MG: 1 INJECTION, SOLUTION INTRAVENOUS at 12:01

## 2020-01-01 RX ADMIN — PHENOBARBITAL SODIUM 30 MG: 65 INJECTION INTRAMUSCULAR; INTRAVENOUS at 21:00

## 2020-01-01 RX ADMIN — SODIUM CHLORIDE, PRESERVATIVE FREE 10 ML: 5 INJECTION INTRAVENOUS at 08:03

## 2020-01-01 RX ADMIN — SODIUM CHLORIDE, POTASSIUM CHLORIDE, SODIUM LACTATE AND CALCIUM CHLORIDE 75 ML/HR: 600; 310; 30; 20 INJECTION, SOLUTION INTRAVENOUS at 09:31

## 2020-01-01 RX ADMIN — SODIUM CHLORIDE, PRESERVATIVE FREE 10 ML: 5 INJECTION INTRAVENOUS at 20:53

## 2020-01-01 RX ADMIN — ACETAMINOPHEN 650 MG: 650 SUPPOSITORY RECTAL at 23:34

## 2020-01-01 RX ADMIN — HYDROCODONE BITARTRATE AND ACETAMINOPHEN 0.5 TABLET: 5; 325 TABLET ORAL at 12:48

## 2020-01-01 RX ADMIN — TIMOLOL MALEATE 1 DROP: 5 SOLUTION/ DROPS OPHTHALMIC at 09:31

## 2020-01-01 RX ADMIN — HYDROCODONE BITARTRATE AND ACETAMINOPHEN 1 TABLET: 5; 325 TABLET ORAL at 15:05

## 2020-01-01 RX ADMIN — LEVETIRACETAM 750 MG: 100 SOLUTION ORAL at 08:58

## 2020-01-01 RX ADMIN — FOSPHENYTOIN SODIUM 100 MG PE: 50 INJECTION, SOLUTION INTRAMUSCULAR; INTRAVENOUS at 06:49

## 2020-01-01 RX ADMIN — SODIUM CHLORIDE, PRESERVATIVE FREE 10 ML: 5 INJECTION INTRAVENOUS at 10:23

## 2020-01-01 RX ADMIN — LEVETIRACETAM 500 MG: 5 INJECTION INTRAVENOUS at 08:25

## 2020-01-01 RX ADMIN — LORAZEPAM 0.5 MG: 2 INJECTION INTRAMUSCULAR; INTRAVENOUS at 01:37

## 2020-01-01 RX ADMIN — FUROSEMIDE 20 MG: 20 TABLET ORAL at 09:31

## 2020-01-01 RX ADMIN — LEVETIRACETAM 1000 MG: 100 SOLUTION ORAL at 09:12

## 2020-01-01 RX ADMIN — FENTANYL 1 PATCH: 50 PATCH TRANSDERMAL at 18:11

## 2020-01-01 RX ADMIN — FINASTERIDE 5 MG: 5 TABLET, FILM COATED ORAL at 21:48

## 2020-01-01 RX ADMIN — ACETAMINOPHEN 650 MG: 650 SUPPOSITORY RECTAL at 06:20

## 2020-01-01 RX ADMIN — CITALOPRAM 10 MG: 20 TABLET, FILM COATED ORAL at 20:36

## 2020-01-01 RX ADMIN — PHENYTOIN 100 MG: 125 SUSPENSION ORAL at 20:47

## 2020-01-01 RX ADMIN — LEVETIRACETAM 1000 MG: 100 SOLUTION ORAL at 20:48

## 2020-01-01 RX ADMIN — MORPHINE SULFATE 4 MG: 2 INJECTION, SOLUTION INTRAMUSCULAR; INTRAVENOUS at 04:29

## 2020-01-01 RX ADMIN — FUROSEMIDE 20 MG: 20 TABLET ORAL at 08:25

## 2020-01-01 RX ADMIN — GLIPIZIDE 2.5 MG: 5 TABLET ORAL at 11:21

## 2020-01-01 RX ADMIN — ACETAMINOPHEN 650 MG: 325 TABLET, FILM COATED ORAL at 20:30

## 2020-01-01 RX ADMIN — LEVOTHYROXINE SODIUM 125 MCG: 125 TABLET ORAL at 07:14

## 2020-01-01 RX ADMIN — LEVETIRACETAM 1000 MG: 10 INJECTION INTRAVENOUS at 04:16

## 2020-01-01 RX ADMIN — SODIUM CHLORIDE, PRESERVATIVE FREE 10 ML: 5 INJECTION INTRAVENOUS at 21:37

## 2020-01-01 RX ADMIN — LEVETIRACETAM 750 MG: 500 TABLET, FILM COATED ORAL at 09:21

## 2020-01-01 RX ADMIN — PHENYTOIN 100 MG: 125 SUSPENSION ORAL at 15:08

## 2020-01-01 RX ADMIN — TIMOLOL MALEATE 1 DROP: 5 SOLUTION/ DROPS OPHTHALMIC at 08:45

## 2020-01-01 RX ADMIN — MORPHINE SULFATE 4 MG: 2 INJECTION, SOLUTION INTRAMUSCULAR; INTRAVENOUS at 20:39

## 2020-01-01 RX ADMIN — POTASSIUM CHLORIDE 40 MEQ: 1.5 POWDER, FOR SOLUTION ORAL at 13:57

## 2020-01-01 RX ADMIN — SODIUM CHLORIDE, PRESERVATIVE FREE 10 ML: 5 INJECTION INTRAVENOUS at 10:48

## 2020-01-01 RX ADMIN — LEVOTHYROXINE SODIUM 125 MCG: 125 TABLET ORAL at 06:50

## 2020-01-01 RX ADMIN — TIMOLOL MALEATE 1 DROP: 5 SOLUTION/ DROPS OPHTHALMIC at 09:35

## 2020-01-01 RX ADMIN — LIDOCAINE 3 PATCH: 50 PATCH TOPICAL at 09:21

## 2020-01-01 RX ADMIN — MORPHINE SULFATE 2 MG: 2 INJECTION, SOLUTION INTRAMUSCULAR; INTRAVENOUS at 16:15

## 2020-01-01 RX ADMIN — PHENOBARBITAL 120 MG: 20 ELIXIR ORAL at 16:52

## 2020-01-01 RX ADMIN — LEVETIRACETAM 750 MG: 500 TABLET, FILM COATED ORAL at 20:36

## 2020-01-01 RX ADMIN — LIDOCAINE 3 PATCH: 50 PATCH TOPICAL at 09:35

## 2020-01-01 RX ADMIN — LIDOCAINE 3 PATCH: 50 PATCH TOPICAL at 08:18

## 2020-01-01 RX ADMIN — FINASTERIDE 5 MG: 5 TABLET, FILM COATED ORAL at 21:00

## 2020-01-01 RX ADMIN — BARIUM SULFATE 4 ML: 980 POWDER, FOR SUSPENSION ORAL at 12:20

## 2020-01-01 RX ADMIN — SODIUM CHLORIDE, PRESERVATIVE FREE 10 ML: 5 INJECTION INTRAVENOUS at 08:24

## 2020-01-01 RX ADMIN — CITALOPRAM 10 MG: 20 TABLET, FILM COATED ORAL at 20:30

## 2020-01-01 RX ADMIN — SODIUM CHLORIDE, PRESERVATIVE FREE 10 ML: 5 INJECTION INTRAVENOUS at 21:58

## 2020-01-01 RX ADMIN — MORPHINE SULFATE 4 MG: 2 INJECTION, SOLUTION INTRAMUSCULAR; INTRAVENOUS at 04:24

## 2020-01-01 RX ADMIN — SODIUM CHLORIDE, POTASSIUM CHLORIDE, SODIUM LACTATE AND CALCIUM CHLORIDE 75 ML/HR: 600; 310; 30; 20 INJECTION, SOLUTION INTRAVENOUS at 16:41

## 2020-01-01 RX ADMIN — TAMSULOSIN HYDROCHLORIDE 0.4 MG: 0.4 CAPSULE ORAL at 09:34

## 2020-01-01 RX ADMIN — LORAZEPAM 0.5 MG: 2 INJECTION INTRAMUSCULAR; INTRAVENOUS at 11:31

## 2020-01-01 RX ADMIN — SODIUM CHLORIDE, POTASSIUM CHLORIDE, SODIUM LACTATE AND CALCIUM CHLORIDE 500 ML: 600; 310; 30; 20 INJECTION, SOLUTION INTRAVENOUS at 18:05

## 2020-01-01 RX ADMIN — SODIUM CHLORIDE, PRESERVATIVE FREE 10 ML: 5 INJECTION INTRAVENOUS at 20:49

## 2020-01-01 RX ADMIN — LEVETIRACETAM 500 MG: 5 INJECTION INTRAVENOUS at 10:12

## 2020-01-01 RX ADMIN — FUROSEMIDE 20 MG: 20 TABLET ORAL at 14:52

## 2020-01-01 RX ADMIN — FAMOTIDINE 20 MG: 10 INJECTION INTRAVENOUS at 11:10

## 2020-01-01 RX ADMIN — SODIUM CHLORIDE, PRESERVATIVE FREE 10 ML: 5 INJECTION INTRAVENOUS at 23:35

## 2020-01-01 RX ADMIN — SODIUM CHLORIDE, PRESERVATIVE FREE 10 ML: 5 INJECTION INTRAVENOUS at 04:14

## 2020-01-01 RX ADMIN — LEVETIRACETAM 500 MG: 5 INJECTION INTRAVENOUS at 16:03

## 2020-01-01 RX ADMIN — FINASTERIDE 5 MG: 5 TABLET, FILM COATED ORAL at 20:20

## 2020-01-01 RX ADMIN — BARIUM SULFATE 55 ML: 0.81 POWDER, FOR SUSPENSION ORAL at 12:19

## 2020-01-01 RX ADMIN — FUROSEMIDE 20 MG: 20 TABLET ORAL at 14:31

## 2020-01-01 RX ADMIN — MORPHINE SULFATE 2 MG: 2 INJECTION, SOLUTION INTRAMUSCULAR; INTRAVENOUS at 14:34

## 2020-01-01 RX ADMIN — DEXTROSE MONOHYDRATE 25 G: 25 INJECTION, SOLUTION INTRAVENOUS at 20:33

## 2020-01-01 RX ADMIN — HYDROMORPHONE HYDROCHLORIDE 0.5 MG: 1 INJECTION, SOLUTION INTRAMUSCULAR; INTRAVENOUS; SUBCUTANEOUS at 09:01

## 2020-01-01 RX ADMIN — INSULIN HUMAN 6 UNITS: 100 INJECTION, SOLUTION PARENTERAL at 01:13

## 2020-01-01 RX ADMIN — MORPHINE SULFATE 4 MG: 2 INJECTION, SOLUTION INTRAMUSCULAR; INTRAVENOUS at 08:00

## 2020-01-01 RX ADMIN — PHENOBARBITAL 120 MG: 20 ELIXIR ORAL at 20:57

## 2020-01-01 RX ADMIN — ACETAMINOPHEN 650 MG: 325 TABLET, FILM COATED ORAL at 16:30

## 2020-01-01 RX ADMIN — LEVETIRACETAM 1000 MG: 10 INJECTION INTRAVENOUS at 14:31

## 2020-01-01 RX ADMIN — LEVETIRACETAM 1000 MG: 100 SOLUTION ORAL at 21:56

## 2020-01-01 RX ADMIN — SODIUM CHLORIDE, POTASSIUM CHLORIDE, SODIUM LACTATE AND CALCIUM CHLORIDE 100 ML/HR: 600; 310; 30; 20 INJECTION, SOLUTION INTRAVENOUS at 20:30

## 2020-01-01 RX ADMIN — SODIUM CHLORIDE 5 MG/HR: 9 INJECTION, SOLUTION INTRAVENOUS at 17:39

## 2020-01-01 RX ADMIN — SODIUM CHLORIDE, PRESERVATIVE FREE 10 ML: 5 INJECTION INTRAVENOUS at 09:08

## 2020-01-01 RX ADMIN — LISINOPRIL 5 MG: 5 TABLET ORAL at 15:05

## 2020-01-01 RX ADMIN — LISINOPRIL 5 MG: 5 TABLET ORAL at 09:31

## 2020-01-01 RX ADMIN — SODIUM CHLORIDE, PRESERVATIVE FREE 10 ML: 5 INJECTION INTRAVENOUS at 09:01

## 2020-01-01 RX ADMIN — PHENOBARBITAL 60 MG: 20 ELIXIR ORAL at 21:56

## 2020-01-01 RX ADMIN — FINASTERIDE 5 MG: 5 TABLET, FILM COATED ORAL at 21:31

## 2020-01-01 RX ADMIN — LIDOCAINE 3 PATCH: 50 PATCH TOPICAL at 08:58

## 2020-01-01 RX ADMIN — SODIUM CHLORIDE, PRESERVATIVE FREE 10 ML: 5 INJECTION INTRAVENOUS at 10:00

## 2020-01-01 RX ADMIN — FUROSEMIDE 20 MG: 20 TABLET ORAL at 15:08

## 2020-01-01 RX ADMIN — LORAZEPAM 0.5 MG: 2 INJECTION INTRAMUSCULAR; INTRAVENOUS at 08:08

## 2020-01-01 RX ADMIN — INSULIN HUMAN 4 UNITS: 100 INJECTION, SOLUTION PARENTERAL at 17:21

## 2020-01-01 RX ADMIN — ACETAMINOPHEN 650 MG: 650 SUPPOSITORY RECTAL at 21:21

## 2020-01-01 RX ADMIN — SODIUM CHLORIDE 5 MG/HR: 9 INJECTION, SOLUTION INTRAVENOUS at 08:43

## 2020-01-01 RX ADMIN — MORPHINE SULFATE 4 MG: 2 INJECTION, SOLUTION INTRAMUSCULAR; INTRAVENOUS at 12:28

## 2020-01-01 RX ADMIN — POTASSIUM CHLORIDE 40 MEQ: 1.5 POWDER, FOR SOLUTION ORAL at 10:10

## 2020-01-01 RX ADMIN — HYDROMORPHONE HYDROCHLORIDE 1 MG: 1 INJECTION, SOLUTION INTRAMUSCULAR; INTRAVENOUS; SUBCUTANEOUS at 08:10

## 2020-01-01 RX ADMIN — MORPHINE SULFATE 4 MG: 2 INJECTION, SOLUTION INTRAMUSCULAR; INTRAVENOUS at 14:51

## 2020-01-01 RX ADMIN — LATANOPROST 1 DROP: 50 SOLUTION/ DROPS OPHTHALMIC at 21:10

## 2020-01-01 RX ADMIN — TAMSULOSIN HYDROCHLORIDE 0.4 MG: 0.4 CAPSULE ORAL at 20:20

## 2020-01-01 RX ADMIN — ACETAMINOPHEN 650 MG: 650 SUPPOSITORY RECTAL at 04:32

## 2020-01-01 RX ADMIN — FOSPHENYTOIN SODIUM 150 MG PE: 50 INJECTION, SOLUTION INTRAMUSCULAR; INTRAVENOUS at 13:52

## 2020-01-01 RX ADMIN — GLIPIZIDE 2.5 MG: 5 TABLET ORAL at 09:12

## 2020-01-01 RX ADMIN — FOSPHENYTOIN SODIUM 150 MG PE: 50 INJECTION, SOLUTION INTRAMUSCULAR; INTRAVENOUS at 01:37

## 2020-01-01 RX ADMIN — TIMOLOL MALEATE 1 DROP: 5 SOLUTION/ DROPS OPHTHALMIC at 11:48

## 2020-01-01 RX ADMIN — BISACODYL 10 MG: 10 SUPPOSITORY RECTAL at 14:17

## 2020-01-01 RX ADMIN — LORAZEPAM 0.5 MG: 2 INJECTION INTRAMUSCULAR; INTRAVENOUS at 14:52

## 2020-01-01 RX ADMIN — LEVOTHYROXINE SODIUM 125 MCG: 125 TABLET ORAL at 06:35

## 2020-01-01 RX ADMIN — FOSPHENYTOIN SODIUM 150 MG PE: 50 INJECTION, SOLUTION INTRAMUSCULAR; INTRAVENOUS at 21:20

## 2020-01-01 RX ADMIN — POTASSIUM CHLORIDE 40 MEQ: 10 CAPSULE, COATED, EXTENDED RELEASE ORAL at 15:03

## 2020-01-01 RX ADMIN — SODIUM CHLORIDE, PRESERVATIVE FREE 10 ML: 5 INJECTION INTRAVENOUS at 20:30

## 2020-01-01 RX ADMIN — CITALOPRAM 20 MG: 20 TABLET, FILM COATED ORAL at 20:54

## 2020-01-01 RX ADMIN — LIDOCAINE 3 PATCH: 50 PATCH TOPICAL at 02:27

## 2020-01-01 RX ADMIN — SODIUM CHLORIDE, PRESERVATIVE FREE 10 ML: 5 INJECTION INTRAVENOUS at 21:21

## 2020-01-01 RX ADMIN — LEVETIRACETAM 500 MG: 5 INJECTION INTRAVENOUS at 09:06

## 2020-01-01 RX ADMIN — MORPHINE SULFATE 2 MG: 2 INJECTION, SOLUTION INTRAMUSCULAR; INTRAVENOUS at 20:44

## 2020-01-01 RX ADMIN — LEVETIRACETAM 1000 MG: 10 INJECTION INTRAVENOUS at 21:59

## 2020-01-01 RX ADMIN — MORPHINE SULFATE 2 MG: 2 INJECTION, SOLUTION INTRAMUSCULAR; INTRAVENOUS at 12:48

## 2020-01-01 RX ADMIN — CITALOPRAM 20 MG: 20 TABLET, FILM COATED ORAL at 22:01

## 2020-01-01 RX ADMIN — LEVOTHYROXINE SODIUM 125 MCG: 125 TABLET ORAL at 06:18

## 2020-01-01 RX ADMIN — LEVETIRACETAM 1000 MG: 10 INJECTION INTRAVENOUS at 21:06

## 2020-01-01 RX ADMIN — PHENOBARBITAL 120 MG: 20 ELIXIR ORAL at 19:50

## 2020-01-01 RX ADMIN — FUROSEMIDE 20 MG: 20 TABLET ORAL at 15:05

## 2020-01-01 RX ADMIN — POTASSIUM CHLORIDE 40 MEQ: 10 CAPSULE, COATED, EXTENDED RELEASE ORAL at 17:52

## 2020-01-01 RX ADMIN — FINASTERIDE 5 MG: 5 TABLET, FILM COATED ORAL at 20:54

## 2020-01-01 RX ADMIN — MORPHINE SULFATE 4 MG: 2 INJECTION, SOLUTION INTRAMUSCULAR; INTRAVENOUS at 09:00

## 2020-01-01 RX ADMIN — FINASTERIDE 5 MG: 5 TABLET, FILM COATED ORAL at 20:57

## 2020-01-01 RX ADMIN — INSULIN LISPRO 3 UNITS: 100 INJECTION, SOLUTION INTRAVENOUS; SUBCUTANEOUS at 21:54

## 2020-01-01 RX ADMIN — GLIPIZIDE 2.5 MG: 5 TABLET ORAL at 06:35

## 2020-01-01 RX ADMIN — LISINOPRIL 5 MG: 5 TABLET ORAL at 09:58

## 2020-01-01 RX ADMIN — POTASSIUM CHLORIDE 40 MEQ: 1.5 POWDER, FOR SOLUTION ORAL at 06:47

## 2020-01-01 RX ADMIN — ACETAMINOPHEN 650 MG: 325 TABLET, FILM COATED ORAL at 17:16

## 2020-01-01 RX ADMIN — HYDROCODONE BITARTRATE AND ACETAMINOPHEN 1 TABLET: 5; 325 TABLET ORAL at 09:05

## 2020-01-01 RX ADMIN — MORPHINE SULFATE 4 MG: 2 INJECTION, SOLUTION INTRAMUSCULAR; INTRAVENOUS at 11:31

## 2020-01-01 RX ADMIN — INSULIN HUMAN 2 UNITS: 100 INJECTION, SOLUTION PARENTERAL at 12:47

## 2020-01-01 RX ADMIN — LEVOTHYROXINE SODIUM 125 MCG: 125 TABLET ORAL at 06:49

## 2020-01-01 RX ADMIN — GLIPIZIDE 2.5 MG: 5 TABLET ORAL at 08:44

## 2020-01-01 RX ADMIN — GLYCOPYRROLATE 0.2 MG: 0.2 INJECTION, SOLUTION INTRAMUSCULAR; INTRAVITREAL at 11:31

## 2020-01-01 RX ADMIN — CITALOPRAM 20 MG: 20 TABLET, FILM COATED ORAL at 20:57

## 2020-01-01 RX ADMIN — MORPHINE SULFATE 2 MG: 2 INJECTION, SOLUTION INTRAMUSCULAR; INTRAVENOUS at 10:23

## 2020-01-01 RX ADMIN — MORPHINE SULFATE 4 MG: 2 INJECTION, SOLUTION INTRAMUSCULAR; INTRAVENOUS at 00:37

## 2020-01-01 RX ADMIN — TIMOLOL MALEATE 1 DROP: 5 SOLUTION/ DROPS OPHTHALMIC at 09:01

## 2020-01-01 RX ADMIN — CITALOPRAM 20 MG: 20 TABLET, FILM COATED ORAL at 23:21

## 2020-01-01 RX ADMIN — CITALOPRAM 10 MG: 20 TABLET, FILM COATED ORAL at 20:20

## 2020-01-01 RX ADMIN — VANCOMYCIN HYDROCHLORIDE 1000 MG: 1 INJECTION, SOLUTION INTRAVENOUS at 00:51

## 2020-01-01 RX ADMIN — LIDOCAINE HYDROCHLORIDE 1 EACH: 40 SOLUTION TOPICAL at 13:12

## 2020-01-01 RX ADMIN — TIMOLOL MALEATE 1 DROP: 5 SOLUTION/ DROPS OPHTHALMIC at 21:31

## 2020-01-01 RX ADMIN — FUROSEMIDE 20 MG: 20 TABLET ORAL at 09:12

## 2020-01-01 RX ADMIN — PHENYTOIN 100 MG: 125 SUSPENSION ORAL at 11:19

## 2020-01-01 RX ADMIN — LATANOPROST 1 DROP: 50 SOLUTION/ DROPS OPHTHALMIC at 20:44

## 2020-01-01 RX ADMIN — GLIPIZIDE 2.5 MG: 5 TABLET ORAL at 06:18

## 2020-01-01 RX ADMIN — TIMOLOL MALEATE 1 DROP: 5 SOLUTION/ DROPS OPHTHALMIC at 14:31

## 2020-01-01 RX ADMIN — LEVETIRACETAM 1000 MG: 10 INJECTION INTRAVENOUS at 08:56

## 2020-01-01 RX ADMIN — FINASTERIDE 5 MG: 5 TABLET, FILM COATED ORAL at 20:30

## 2020-01-01 RX ADMIN — PHENOBARBITAL 60 MG: 20 ELIXIR ORAL at 22:29

## 2020-01-01 RX ADMIN — HYDROMORPHONE HYDROCHLORIDE 0.5 MG: 1 INJECTION, SOLUTION INTRAMUSCULAR; INTRAVENOUS; SUBCUTANEOUS at 18:08

## 2020-01-01 RX ADMIN — FUROSEMIDE 20 MG: 20 TABLET ORAL at 13:51

## 2020-01-01 RX ADMIN — FENTANYL 1 PATCH: 50 PATCH TRANSDERMAL at 12:19

## 2020-01-01 RX ADMIN — FENTANYL CITRATE 25 MCG: 50 INJECTION, SOLUTION INTRAMUSCULAR; INTRAVENOUS at 11:10

## 2020-01-01 RX ADMIN — LEVETIRACETAM 1000 MG: 100 SOLUTION ORAL at 09:59

## 2020-01-01 RX ADMIN — ACETAMINOPHEN 650 MG: 325 TABLET, FILM COATED ORAL at 05:00

## 2020-01-01 RX ADMIN — SODIUM CHLORIDE 868.5 MG PE: 9 INJECTION, SOLUTION INTRAVENOUS at 18:48

## 2020-01-01 RX ADMIN — ACETAMINOPHEN 650 MG: 325 TABLET, FILM COATED ORAL at 09:43

## 2020-01-01 RX ADMIN — MORPHINE SULFATE 2 MG: 2 INJECTION, SOLUTION INTRAMUSCULAR; INTRAVENOUS at 22:09

## 2020-01-01 RX ADMIN — MORPHINE SULFATE 4 MG: 2 INJECTION, SOLUTION INTRAMUSCULAR; INTRAVENOUS at 20:28

## 2020-01-01 RX ADMIN — LEVETIRACETAM 500 MG: 500 TABLET, FILM COATED ORAL at 09:34

## 2020-01-01 RX ADMIN — LEVOTHYROXINE SODIUM 125 MCG: 125 TABLET ORAL at 06:48

## 2020-01-01 RX ADMIN — TIMOLOL MALEATE 1 DROP: 5 SOLUTION/ DROPS OPHTHALMIC at 09:21

## 2020-01-01 RX ADMIN — INSULIN HUMAN 4 UNITS: 100 INJECTION, SOLUTION PARENTERAL at 06:30

## 2020-01-01 RX ADMIN — FENTANYL CITRATE 25 MCG: 50 INJECTION INTRAMUSCULAR; INTRAVENOUS at 13:20

## 2020-01-01 RX ADMIN — LATANOPROST 1 DROP: 50 SOLUTION/ DROPS OPHTHALMIC at 21:20

## 2020-01-01 RX ADMIN — FUROSEMIDE 20 MG: 20 TABLET ORAL at 11:48

## 2020-01-01 RX ADMIN — ACETAMINOPHEN 650 MG: 325 TABLET, FILM COATED ORAL at 18:01

## 2020-01-01 RX ADMIN — LATANOPROST 1 DROP: 50 SOLUTION/ DROPS OPHTHALMIC at 20:30

## 2020-01-01 RX ADMIN — SODIUM CHLORIDE, POTASSIUM CHLORIDE, SODIUM LACTATE AND CALCIUM CHLORIDE 75 ML/HR: 600; 310; 30; 20 INJECTION, SOLUTION INTRAVENOUS at 14:18

## 2020-01-01 RX ADMIN — INSULIN HUMAN 4 UNITS: 100 INJECTION, SOLUTION PARENTERAL at 23:53

## 2020-01-01 RX ADMIN — LIDOCAINE 3 PATCH: 50 PATCH TOPICAL at 14:34

## 2020-01-01 RX ADMIN — LEVETIRACETAM 1000 MG: 100 SOLUTION ORAL at 19:50

## 2020-01-01 RX ADMIN — LIDOCAINE 3 PATCH: 50 PATCH TOPICAL at 09:31

## 2020-01-01 RX ADMIN — LEVOTHYROXINE SODIUM 125 MCG: 125 TABLET ORAL at 06:39

## 2020-01-01 RX ADMIN — SODIUM CHLORIDE, PRESERVATIVE FREE 10 ML: 5 INJECTION INTRAVENOUS at 21:00

## 2020-01-01 RX ADMIN — CITALOPRAM 20 MG: 20 TABLET, FILM COATED ORAL at 21:48

## 2020-01-01 RX ADMIN — SODIUM CHLORIDE, PRESERVATIVE FREE 10 ML: 5 INJECTION INTRAVENOUS at 20:29

## 2020-01-01 RX ADMIN — LATANOPROST 1 DROP: 50 SOLUTION/ DROPS OPHTHALMIC at 22:01

## 2020-01-01 RX ADMIN — MORPHINE SULFATE 4 MG: 2 INJECTION, SOLUTION INTRAMUSCULAR; INTRAVENOUS at 16:50

## 2020-01-01 RX ADMIN — GLIPIZIDE 2.5 MG: 5 TABLET ORAL at 06:50

## 2020-01-01 RX ADMIN — FENTANYL 1 PATCH: 25 PATCH TRANSDERMAL at 21:00

## 2020-01-01 RX ADMIN — LATANOPROST 1 DROP: 50 SOLUTION/ DROPS OPHTHALMIC at 00:52

## 2020-01-01 RX ADMIN — FINASTERIDE 5 MG: 5 TABLET, FILM COATED ORAL at 20:49

## 2020-01-01 RX ADMIN — LATANOPROST 1 DROP: 50 SOLUTION/ DROPS OPHTHALMIC at 20:23

## 2020-01-01 RX ADMIN — LATANOPROST 1 DROP: 50 SOLUTION/ DROPS OPHTHALMIC at 20:49

## 2020-01-01 RX ADMIN — SODIUM CHLORIDE, PRESERVATIVE FREE 10 ML: 5 INJECTION INTRAVENOUS at 21:30

## 2020-01-01 RX ADMIN — SODIUM CHLORIDE, POTASSIUM CHLORIDE, SODIUM LACTATE AND CALCIUM CHLORIDE 75 ML/HR: 600; 310; 30; 20 INJECTION, SOLUTION INTRAVENOUS at 23:10

## 2020-01-01 RX ADMIN — SODIUM CHLORIDE, PRESERVATIVE FREE 10 ML: 5 INJECTION INTRAVENOUS at 08:00

## 2020-01-01 RX ADMIN — ACETAMINOPHEN 650 MG: 650 SUPPOSITORY RECTAL at 16:44

## 2020-01-01 RX ADMIN — FUROSEMIDE 20 MG: 20 TABLET ORAL at 08:30

## 2020-01-01 RX ADMIN — HYDROMORPHONE HYDROCHLORIDE 0.5 MG: 1 INJECTION, SOLUTION INTRAMUSCULAR; INTRAVENOUS; SUBCUTANEOUS at 15:26

## 2020-01-01 RX ADMIN — FINASTERIDE 5 MG: 5 TABLET, FILM COATED ORAL at 20:44

## 2020-01-01 RX ADMIN — SODIUM CHLORIDE, PRESERVATIVE FREE 10 ML: 5 INJECTION INTRAVENOUS at 09:13

## 2020-01-01 RX ADMIN — LIDOCAINE HYDROCHLORIDE 60 MG: 20 INJECTION, SOLUTION INFILTRATION; PERINEURAL at 13:11

## 2020-01-01 RX ADMIN — FENTANYL CITRATE 25 MCG: 50 INJECTION INTRAMUSCULAR; INTRAVENOUS at 14:50

## 2020-01-01 RX ADMIN — FINASTERIDE 5 MG: 5 TABLET, FILM COATED ORAL at 22:01

## 2020-01-01 RX ADMIN — LEVETIRACETAM 1000 MG: 10 INJECTION INTRAVENOUS at 10:09

## 2020-01-01 RX ADMIN — MORPHINE SULFATE 4 MG: 2 INJECTION, SOLUTION INTRAMUSCULAR; INTRAVENOUS at 23:31

## 2020-01-01 RX ADMIN — FUROSEMIDE 20 MG: 20 TABLET ORAL at 08:11

## 2020-01-01 RX ADMIN — LEVOTHYROXINE SODIUM 125 MCG: 125 TABLET ORAL at 06:26

## 2020-01-01 RX ADMIN — LEVETIRACETAM 1000 MG: 10 INJECTION INTRAVENOUS at 04:54

## 2020-01-01 RX ADMIN — INSULIN HUMAN 4 UNITS: 100 INJECTION, SOLUTION PARENTERAL at 12:36

## 2020-01-01 RX ADMIN — FENTANYL 1 PATCH: 50 PATCH TRANSDERMAL at 11:31

## 2020-01-01 RX ADMIN — PHENOBARBITAL SODIUM 30 MG: 65 INJECTION INTRAMUSCULAR; INTRAVENOUS at 20:44

## 2020-01-01 RX ADMIN — FOSPHENYTOIN SODIUM 150 MG PE: 50 INJECTION, SOLUTION INTRAMUSCULAR; INTRAVENOUS at 21:03

## 2020-01-01 RX ADMIN — LEVETIRACETAM 500 MG: 5 INJECTION INTRAVENOUS at 20:20

## 2020-01-01 RX ADMIN — LEVETIRACETAM 500 MG: 500 TABLET, FILM COATED ORAL at 21:00

## 2020-01-01 RX ADMIN — SUGAMMADEX 200 MG: 100 INJECTION, SOLUTION INTRAVENOUS at 14:39

## 2020-01-01 RX ADMIN — SODIUM CHLORIDE, PRESERVATIVE FREE 10 ML: 5 INJECTION INTRAVENOUS at 20:58

## 2020-01-01 RX ADMIN — ACETAMINOPHEN 650 MG: 650 SUPPOSITORY RECTAL at 08:59

## 2020-01-01 RX ADMIN — GADOTERATE MEGLUMINE 13 ML: 376.9 INJECTION, SOLUTION INTRAVENOUS at 17:25

## 2020-01-01 RX ADMIN — TIMOLOL MALEATE 1 DROP: 5 SOLUTION/ DROPS OPHTHALMIC at 08:26

## 2020-01-01 RX ADMIN — ACETAMINOPHEN 650 MG: 650 SUPPOSITORY RECTAL at 02:14

## 2020-01-01 RX ADMIN — GLYCOPYRROLATE 0.2 MG: 0.2 INJECTION, SOLUTION INTRAMUSCULAR; INTRAVITREAL at 14:52

## 2020-01-01 RX ADMIN — TIMOLOL MALEATE 1 DROP: 5 SOLUTION/ DROPS OPHTHALMIC at 15:09

## 2020-01-01 RX ADMIN — ACETAMINOPHEN 650 MG: 325 TABLET, FILM COATED ORAL at 09:21

## 2020-01-01 RX ADMIN — FINASTERIDE 5 MG: 5 TABLET, FILM COATED ORAL at 21:56

## 2020-01-01 RX ADMIN — LEVETIRACETAM 1000 MG: 10 INJECTION INTRAVENOUS at 09:17

## 2020-01-01 RX ADMIN — SODIUM CHLORIDE, PRESERVATIVE FREE 10 ML: 5 INJECTION INTRAVENOUS at 08:46

## 2020-01-01 RX ADMIN — ACETAMINOPHEN 650 MG: 650 SUPPOSITORY RECTAL at 14:31

## 2020-01-01 RX ADMIN — MORPHINE SULFATE 10 MG: 100 SOLUTION ORAL at 21:22

## 2020-01-01 RX ADMIN — FUROSEMIDE 20 MG: 20 TABLET ORAL at 15:33

## 2020-01-01 RX ADMIN — POTASSIUM CHLORIDE 40 MEQ: 10 CAPSULE, COATED, EXTENDED RELEASE ORAL at 06:35

## 2020-01-01 RX ADMIN — SODIUM CHLORIDE, PRESERVATIVE FREE 10 ML: 5 INJECTION INTRAVENOUS at 21:49

## 2020-01-01 RX ADMIN — FOSPHENYTOIN SODIUM 150 MG PE: 50 INJECTION, SOLUTION INTRAMUSCULAR; INTRAVENOUS at 13:27

## 2020-01-01 RX ADMIN — SODIUM CHLORIDE, PRESERVATIVE FREE 10 ML: 5 INJECTION INTRAVENOUS at 20:39

## 2020-01-01 RX ADMIN — FENTANYL CITRATE 25 MCG: 50 INJECTION INTRAMUSCULAR; INTRAVENOUS at 13:48

## 2020-01-01 RX ADMIN — LEVETIRACETAM 1000 MG: 10 INJECTION INTRAVENOUS at 15:43

## 2020-01-01 RX ADMIN — SODIUM CHLORIDE, POTASSIUM CHLORIDE, SODIUM LACTATE AND CALCIUM CHLORIDE 100 ML/HR: 600; 310; 30; 20 INJECTION, SOLUTION INTRAVENOUS at 01:41

## 2020-01-01 RX ADMIN — MORPHINE SULFATE 4 MG: 2 INJECTION, SOLUTION INTRAMUSCULAR; INTRAVENOUS at 01:37

## 2020-01-01 RX ADMIN — CITALOPRAM 10 MG: 20 TABLET, FILM COATED ORAL at 20:48

## 2020-01-01 RX ADMIN — TIMOLOL MALEATE 1 DROP: 5 SOLUTION/ DROPS OPHTHALMIC at 09:13

## 2020-01-01 RX ADMIN — MORPHINE SULFATE 4 MG: 2 INJECTION, SOLUTION INTRAMUSCULAR; INTRAVENOUS at 12:58

## 2020-01-01 RX ADMIN — LISINOPRIL 5 MG: 5 TABLET ORAL at 09:12

## 2020-01-01 RX ADMIN — PHENOBARBITAL SODIUM 30 MG: 65 INJECTION INTRAMUSCULAR; INTRAVENOUS at 20:29

## 2020-01-01 RX ADMIN — DEXTROSE MONOHYDRATE 25 G: 25 INJECTION, SOLUTION INTRAVENOUS at 06:29

## 2020-01-01 RX ADMIN — SODIUM CHLORIDE, POTASSIUM CHLORIDE, SODIUM LACTATE AND CALCIUM CHLORIDE 100 ML/HR: 600; 310; 30; 20 INJECTION, SOLUTION INTRAVENOUS at 06:17

## 2020-01-01 RX ADMIN — LISINOPRIL 5 MG: 5 TABLET ORAL at 08:44

## 2020-01-01 RX ADMIN — PROPOFOL 80 MG: 10 INJECTION, EMULSION INTRAVENOUS at 13:11

## 2020-01-01 RX ADMIN — CITALOPRAM 10 MG: 20 TABLET, FILM COATED ORAL at 21:00

## 2020-01-01 RX ADMIN — LATANOPROST 1 DROP: 50 SOLUTION/ DROPS OPHTHALMIC at 20:37

## 2020-01-01 RX ADMIN — LEVETIRACETAM 1000 MG: 10 INJECTION INTRAVENOUS at 15:25

## 2020-01-01 RX ADMIN — LISINOPRIL 5 MG: 5 TABLET ORAL at 08:11

## 2020-01-01 RX ADMIN — SODIUM CHLORIDE, PRESERVATIVE FREE 10 ML: 5 INJECTION INTRAVENOUS at 08:07

## 2020-01-01 RX ADMIN — MORPHINE SULFATE 4 MG: 2 INJECTION, SOLUTION INTRAMUSCULAR; INTRAVENOUS at 08:44

## 2020-01-01 RX ADMIN — INSULIN HUMAN 4 UNITS: 100 INJECTION, SOLUTION PARENTERAL at 12:09

## 2020-01-01 RX ADMIN — IOPAMIDOL 85 ML: 612 INJECTION, SOLUTION INTRAVENOUS at 13:22

## 2020-01-01 RX ADMIN — LIDOCAINE 3 PATCH: 50 PATCH TOPICAL at 08:43

## 2020-01-01 RX ADMIN — ACETAMINOPHEN 650 MG: 325 TABLET, FILM COATED ORAL at 09:13

## 2020-01-01 RX ADMIN — HYDROMORPHONE HYDROCHLORIDE 0.5 MG: 1 INJECTION, SOLUTION INTRAMUSCULAR; INTRAVENOUS; SUBCUTANEOUS at 20:36

## 2020-01-01 RX ADMIN — SODIUM CHLORIDE, POTASSIUM CHLORIDE, SODIUM LACTATE AND CALCIUM CHLORIDE 100 ML/HR: 600; 310; 30; 20 INJECTION, SOLUTION INTRAVENOUS at 16:37

## 2020-01-01 RX ADMIN — SODIUM CHLORIDE, POTASSIUM CHLORIDE, SODIUM LACTATE AND CALCIUM CHLORIDE: 600; 310; 30; 20 INJECTION, SOLUTION INTRAVENOUS at 14:37

## 2020-01-01 RX ADMIN — GLIPIZIDE 2.5 MG: 5 TABLET ORAL at 06:49

## 2020-01-01 RX ADMIN — LORAZEPAM 0.5 MG: 2 INJECTION INTRAMUSCULAR; INTRAVENOUS at 20:28

## 2020-01-01 RX ADMIN — LIDOCAINE 3 PATCH: 50 PATCH TOPICAL at 08:25

## 2020-01-01 RX ADMIN — TIMOLOL MALEATE 1 DROP: 5 SOLUTION/ DROPS OPHTHALMIC at 10:04

## 2020-01-01 RX ADMIN — TIMOLOL MALEATE 1 DROP: 5 SOLUTION/ DROPS OPHTHALMIC at 08:57

## 2020-01-01 RX ADMIN — LORAZEPAM 0.5 MG: 2 INJECTION INTRAMUSCULAR; INTRAVENOUS at 20:39

## 2020-01-01 RX ADMIN — LISINOPRIL 5 MG: 5 TABLET ORAL at 11:48

## 2020-01-01 RX ADMIN — INSULIN HUMAN 2 UNITS: 100 INJECTION, SOLUTION PARENTERAL at 18:10

## 2020-01-01 RX ADMIN — FOSPHENYTOIN SODIUM 150 MG PE: 50 INJECTION, SOLUTION INTRAMUSCULAR; INTRAVENOUS at 06:42

## 2020-01-01 RX ADMIN — HYDROMORPHONE HYDROCHLORIDE 0.5 MG: 10 INJECTION INTRAMUSCULAR; INTRAVENOUS; SUBCUTANEOUS at 01:27

## 2020-01-01 RX ADMIN — INSULIN HUMAN 2 UNITS: 100 INJECTION, SOLUTION PARENTERAL at 11:58

## 2020-01-01 RX ADMIN — MORPHINE SULFATE 2 MG: 2 INJECTION, SOLUTION INTRAMUSCULAR; INTRAVENOUS at 22:31

## 2020-01-01 RX ADMIN — FENTANYL CITRATE 25 MCG: 50 INJECTION INTRAMUSCULAR; INTRAVENOUS at 13:41

## 2020-01-01 RX ADMIN — SODIUM CHLORIDE, PRESERVATIVE FREE 10 ML: 5 INJECTION INTRAVENOUS at 20:36

## 2020-01-01 RX ADMIN — MORPHINE SULFATE 4 MG: 2 INJECTION, SOLUTION INTRAMUSCULAR; INTRAVENOUS at 18:15

## 2020-01-01 RX ADMIN — FUROSEMIDE 20 MG: 20 TABLET ORAL at 09:59

## 2020-01-01 RX ADMIN — ONDANSETRON HYDROCHLORIDE 4 MG: 2 SOLUTION INTRAMUSCULAR; INTRAVENOUS at 14:13

## 2020-01-01 RX ADMIN — LATANOPROST 1 DROP: 50 SOLUTION/ DROPS OPHTHALMIC at 21:00

## 2020-01-01 RX ADMIN — LEVETIRACETAM 750 MG: 500 TABLET, FILM COATED ORAL at 20:30

## 2020-01-01 RX ADMIN — LIDOCAINE 3 PATCH: 50 PATCH TOPICAL at 08:56

## 2020-01-01 RX ADMIN — FOSPHENYTOIN SODIUM 150 MG PE: 50 INJECTION, SOLUTION INTRAMUSCULAR; INTRAVENOUS at 09:28

## 2020-01-01 RX ADMIN — VANCOMYCIN HYDROCHLORIDE 1000 MG: 1 INJECTION, SOLUTION INTRAVENOUS at 12:19

## 2020-01-01 RX ADMIN — LATANOPROST 1 DROP: 50 SOLUTION/ DROPS OPHTHALMIC at 21:48

## 2020-01-01 RX ADMIN — SODIUM CHLORIDE, PRESERVATIVE FREE 10 ML: 5 INJECTION INTRAVENOUS at 08:56

## 2020-01-01 RX ADMIN — POTASSIUM CHLORIDE AND SODIUM CHLORIDE 100 ML/HR: 900; 150 INJECTION, SOLUTION INTRAVENOUS at 00:52

## 2020-01-01 RX ADMIN — ROCURONIUM BROMIDE 50 MG: 10 INJECTION INTRAVENOUS at 13:11

## 2020-01-01 RX ADMIN — INSULIN HUMAN 3 UNITS: 100 INJECTION, SOLUTION PARENTERAL at 06:49

## 2020-01-01 RX ADMIN — LEVETIRACETAM 500 MG: 5 INJECTION INTRAVENOUS at 22:54

## 2020-01-01 RX ADMIN — TIMOLOL MALEATE 1 DROP: 5 SOLUTION/ DROPS OPHTHALMIC at 08:03

## 2020-01-09 NOTE — PROGRESS NOTES
"Subjective   Haris Sweeney is a 89 y.o. male.  Patient presents with chief complaint of essential hypertension, chronic combined systolic and diastolic congestive heart failure, paroxysmal atrial fibrillation, gastroesophageal reflux disease, type 2 diabetes mellitus is well controlled, hypothyroidism with a recent TSH of 8.124 indicating the need for an increase of his Levoxyl from 100 mcg/day to 125 mcg/day here for follow-up evaluation and treatment.      /82   Pulse 78   Ht 162.6 cm (64.02\")   Wt 65.8 kg (145 lb)   SpO2 98%   BMI 24.88 kg/m²     Body mass index is 24.88 kg/m².    History of Present Illness fatigue as you would expect for somebody with undertreated thyroid disease    The following portions of the patient's history were reviewed and updated as appropriate: allergies, current medications, past family history, past medical history, past social history, past surgical history and problem list.    Review of Systems   Constitutional: Positive for fatigue.   HENT: Negative.    Eyes: Negative.    Respiratory: Negative.    Cardiovascular: Negative.    Gastrointestinal: Negative.    Endocrine: Negative.    Genitourinary: Negative.    Musculoskeletal: Positive for arthralgias and gait problem.   Skin: Negative.    Allergic/Immunologic: Negative.    Hematological: Negative.    Psychiatric/Behavioral: Negative.        Objective   Physical Exam   Constitutional: He is oriented to person, place, and time. He appears well-developed and well-nourished.   HENT:   Head: Normocephalic and atraumatic.   Eyes: Pupils are equal, round, and reactive to light. EOM are normal.   Neck: Normal range of motion. Neck supple.   Cardiovascular: Normal rate and regular rhythm.   Murmur heard.  Pulmonary/Chest: Effort normal and breath sounds normal.   Abdominal: Soft. Bowel sounds are normal.   Musculoskeletal:   Somewhat unsteady gait uses a cane at all times   Neurological: He is alert and oriented to person, " place, and time.   Skin: Skin is warm and dry.   Psychiatric: He has a normal mood and affect. His behavior is normal.   Nursing note and vitals reviewed.        Assessment/Plan   Haris was seen today for hypertension, diabetes and hypothyroidism.    Diagnoses and all orders for this visit:    Essential hypertension  Comments:  well controlled    Chronic combined systolic and diastolic congestive heart failure (CMS/HCC)  Comments:  stable for now    Paroxysmal atrial fibrillation (CMS/[facility])  Comments:  in NSR today    Gastroesophageal reflux disease without esophagitis  Comments:  well controlled    Non-insulin dependent type 2 diabetes mellitus (CMS/[facility])  Comments:  doing well overall  Orders:  -     Comprehensive metabolic panel; Future  -     Hemoglobin A1c; Future    Acquired hypothyroidism  -     TSH; Future    Other orders  -     SYNTHROID 125 MCG tablet; Take 1 tablet by mouth Daily.

## 2020-05-21 NOTE — TELEPHONE ENCOUNTER
PATIENT AND DAUGHTER IN LAW CALLED AND STATED HE FELL Monday AND HURT HIS ARM, SKINNED ROLLED BACK AND OPEN WOUND. IT KEEPS BLEEDING DUE TO THE FACT HE IS ON ELIQUIS. NEED REFERRAL TO WOUND CARE CENTER ASAP  PLEASE  CALL [phone]

## 2020-07-16 NOTE — PROGRESS NOTES
"Subjective   Haris Sweeney is a 89 y.o. male.  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  He has not had any difficulty with hypoglycemic episodes and overall his blood sugars have been excellent I highly recommended that he continue to stay active and he is doing amazingly well for a gentleman of 89 years.      /64   Pulse 74   Ht 162.6 cm (64\")   Wt 60.8 kg (134 lb)   SpO2 96%   BMI 23.00 kg/m²     Body mass index is 23 kg/m².    History of Present Illness having a lot of trouble with arthritis of the spine    The following portions of the patient's history were reviewed and updated as appropriate: allergies, current medications, past family history, past medical history, past social history, past surgical history and problem list.    Review of Systems   Constitutional: Positive for fatigue.   HENT: Negative.    Respiratory: Negative.    Cardiovascular: Negative.    Gastrointestinal: Positive for constipation.   Musculoskeletal: Positive for arthralgias and back pain.   Neurological: Negative.    Psychiatric/Behavioral: Negative.        Objective   Physical Exam   Constitutional: He is oriented to person, place, and time. He appears well-developed and well-nourished.   HENT:   Head: Normocephalic and atraumatic.   Cardiovascular: Normal rate and regular rhythm.   Murmur heard.  Pulmonary/Chest: Effort normal and breath sounds normal.   Abdominal: Soft. Bowel sounds are normal.   Musculoskeletal:   Slightly unsteady in his gait but he uses a cane at all times   Neurological: He is alert and oriented to person, place, and time.   Psychiatric: He has a normal mood and affect. "   Nursing note and vitals reviewed.        Assessment/Plan   Haris was seen today for hypertension.    Diagnoses and all orders for this visit:    Chronic combined systolic and diastolic congestive heart failure (CMS/HCC)  Comments:  stable for now    Essential hypertension  Comments:  well controlled    Gastroesophageal reflux disease without esophagitis  Comments:  well controlled    Non-insulin dependent type 2 diabetes mellitus (CMS/HCC)  Comments:  check an A1c and a CMP  Orders:  -     Comprehensive metabolic panel; Future  -     Hemoglobin A1c; Future  -     Hemoglobin A1c  -     Comprehensive metabolic panel    Acquired hypothyroidism  Comments:  no changes needed  Orders:  -     TSH; Future  -     T4; Future  -     T4  -     TSH    Idiopathic chronic gout without tophus, unspecified site  Comments:  allopurinol daily    Other orders  -     meloxicam (MOBIC) 15 MG tablet; Take 1 tablet by mouth Daily.

## 2020-07-20 NOTE — TELEPHONE ENCOUNTER
There are some abnormalities on his labs but they are consistent with his previous labs and overall his current labs are excellent please send him a copy

## 2020-07-20 NOTE — TELEPHONE ENCOUNTER
PATIENT IS CALLING IN REGARDS TO WANTING A COPY OF HIS LATEST BLOOD WORK RESULTS MAILED TO HIM.    PLEASE CALL PATIENT AND ADVISE  [phone]

## 2020-08-20 PROBLEM — S06.5XAA SDH (SUBDURAL HEMATOMA) (HCC): Status: ACTIVE | Noted: 2020-01-01

## 2020-08-21 NOTE — ANESTHESIA POSTPROCEDURE EVALUATION
Patient: Haris Sweeney    Procedure Summary     Date:  08/21/20 Room / Location:  [facility] OR [address] [place] [address] MAIN OR    Anesthesia Start:  1300 Anesthesia Stop:  1457    Procedure:  Right-sided craniotomy for subdural (Right Head) Diagnosis:       SDH (subdural hematoma) (CMS/HCC)      (SDH (subdural hematoma) (CMS/HCC) [S06.5X9A])    Surgeon:  Logan Delgado MD Provider:  Uriah Almendarez MD    Anesthesia Type:  general ASA Status:  4          Anesthesia Type: general    Vitals  Vitals Value Taken Time   /73 8/21/2020  4:45 PM   Temp 37.3 °C (99.1 °F) 8/21/2020  2:56 PM   Pulse 90 8/21/2020  4:58 PM   Resp 20 8/21/2020  4:45 PM   SpO2 99 % 8/21/2020  4:58 PM   Vitals shown include unvalidated device data.        Post Anesthesia Care and Evaluation    Patient location during evaluation: bedside  Patient participation: complete - patient participated  Level of consciousness: awake  Pain management: adequate  Airway patency: patent  Anesthetic complications: No anesthetic complications    Cardiovascular status: acceptable  Respiratory status: acceptable  Hydration status: acceptable

## 2020-08-21 NOTE — ANESTHESIA PROCEDURE NOTES
Arterial Line    Pre-sedation assessment completed: 8/21/2020 11:12 AM    Patient reassessed immediately prior to procedure    Patient location during procedure: holding area  Start time: 8/21/2020 11:12 AM  Stop Time:8/21/2020 11:18 AM       Line placed for hemodynamic monitoring.  Performed By   Anesthesiologist: Hans Arevalo MD  Preanesthetic Checklist  Completed: patient identified, site marked, surgical consent, pre-op evaluation, timeout performed, IV checked, risks and benefits discussed and monitors and equipment checked  Arterial Line Prep   Sterile Tech: gloves and sterile barriers  Prep: ChloraPrep  Patient monitoring: blood pressure monitoring, continuous pulse oximetry and EKG  Arterial Line Procedure   Laterality:left  Location:  radial artery  Catheter size: 20 G   Guidance: ultrasound guided  PROCEDURE NOTE/ULTRASOUND INTERPRETATION.  Using ultrasound guidance the potential vascular sites for insertion of the catheter were visualized to determine the patency of the vessel to be used for vascular access.  After selecting the appropriate site for insertion, the needle was visualized under ultrasound being inserted into the radial artery, followed by ultrasound confirmation of wire and catheter placement. There were no abnormalities seen on ultrasound; an image was taken; and the patient tolerated the procedure with no complications.   Number of attempts: 1  Successful placement: yes  Post Assessment   Dressing Type: occlusive dressing applied, secured with tape and wrist guard applied.   Complications no  Circ/Move/Sens Assessment: unchanged.   Patient Tolerance: patient tolerated the procedure well with no apparent complications

## 2020-08-21 NOTE — ANESTHESIA PREPROCEDURE EVALUATION
Anesthesia Evaluation     Patient summary reviewed and Nursing notes reviewed   NPO Solid Status: > 8 hours  NPO Liquid Status: > 2 hours           Airway   Mallampati: II  TM distance: >3 FB  Neck ROM: full  Dental - normal exam     Pulmonary - normal exam   (+) a smoker Former,   Cardiovascular - normal exam    ECG reviewed    (+) hypertension, valvular problems/murmurs MR and AI, dysrhythmias Atrial Fib, CHF ,       Neuro/Psych  (+) numbness, psychiatric history,       ROS Comment: Traumatic subarachnoid hemorrhage   SDH (subdural hematoma) (CMS/HCC  GI/Hepatic/Renal/Endo    (+)  GERD,  renal disease stones, diabetes mellitus type 2, thyroid problem hypothyroidism    Musculoskeletal (-) negative ROS    Abdominal    Substance History - negative use     OB/GYN negative ob/gyn ROS         Other                        Anesthesia Plan    ASA 4     general       Anesthetic plan, all risks, benefits, and alternatives have been provided, discussed and informed consent has been obtained with: patient.

## 2020-08-21 NOTE — ANESTHESIA PROCEDURE NOTES
Airway  Urgency: elective    Date/Time: 8/21/2020 1:12 PM  Airway not difficult    General Information and Staff    Patient location during procedure: OR  Anesthesiologist: Uriah Almendarez MD  CRNA: Bradley Johnson CRNA    Indications and Patient Condition  Indications for airway management: airway protection    Preoxygenated: yes  MILS maintained throughout  Mask difficulty assessment: 1 - vent by mask    Final Airway Details  Final airway type: endotracheal airway      Successful airway: ETT  Cuffed: yes   Successful intubation technique: direct laryngoscopy  Endotracheal tube insertion site: oral  Blade: Kang  Blade size: 3  ETT size (mm): 7.5  Cormack-Lehane Classification: grade I - full view of glottis  Placement verified by: chest auscultation and capnometry   Measured from: gums  ETT/EBT to gums (cm): 21  Number of attempts at approach: 1  Assessment: lips, teeth, and gum same as pre-op and atraumatic intubation

## 2020-08-22 PROBLEM — S09.90XA HEAD INJURY DUE TO TRAUMA: Status: ACTIVE | Noted: 2020-01-01

## 2020-08-22 PROBLEM — D68.9 COAGULOPATHY (HCC): Status: ACTIVE | Noted: 2020-01-01

## 2020-08-22 PROBLEM — S06.5X0A TRAUMATIC SUBDURAL HEMATOMA WITHOUT LOSS OF CONSCIOUSNESS (HCC): Status: ACTIVE | Noted: 2020-01-01

## 2020-08-27 NOTE — TELEPHONE ENCOUNTER
Patient's daughter called and requested a call back from Dr. Delgado. He is currently in the hospital.

## 2020-09-01 NOTE — PLAN OF CARE
Pt c/o unrelieved pain. PRN Norco given x2, Lidoderm patches applied to bilateral flanks and lower back per order. Rec'd orders for Fentanyl patch and d/c [place]. Waiting on pharmacy to restock accudose. See shannen note re: bloody urine during in/out cath. Urology consulted, placed #22 Fr catheter to BSD and irrigated. VFSS, no changes made to current diet order. CTM.

## 2020-09-01 NOTE — PLAN OF CARE
Problem: Patient Care Overview  Goal: Plan of Care Review  Outcome: Ongoing (interventions implemented as appropriate)  Flowsheets (Taken 9/1/2020 1403)  Plan of Care Reviewed With: patient  Outcome Summary: VFSS completed.  Pt with inconsistent penetration/trace aspiration with thin by cup and straw.  Premature spillage, mistiming, and reduced pharyngeal strength impair swallow safety.  REC continue puree diet with nectar thick liquids.  Meds with puree.  Water protocol w/ supervision after oral care.  Pt upright with all po.

## 2020-09-01 NOTE — NURSING NOTE
Rec'd verbal orders, R&V per Dr. Awad to start Fentanyl patch 25mcg Q 72hrs, keep Lidoderm patches, and d/c PRN Norco.

## 2020-09-01 NOTE — CONSULTS
[facility]  Ted Fletcher MD    Patient Care Team:  Jesus Saez MD as PCP - General (Internal Medicine)  Casper Torres MD as PCP - Claims Attributed    Chief complaint: Urinary retention, gross hematuria    Subjective .     History of present illness: This 89-year-old male has a complicated medical history including a fall resulting in a right hemisphere subdural hematoma requiring craniotomy.  He has been in urinary retention requiring intermittent catheterization by the nursing staff.  Today he developed some grossly bloody urine with some clots.  Presently he is in retention.    Past medical history includes bladder cancer in the past including bladder installations of BCG.  I have not seen him in a year and a half.  Normally the patient lives at home with his wife.  She relates he has been getting weaker lately and resulted in falls.    Review of Systems  All systems were reviewed and were negative except for patient complains of abdominal discomfort and the need to pee.  Otherwise questions were answered by his family.    History  Past Medical History:   Diagnosis Date   • A-fib (CMS/HCC)    • Arthritis    • Bladder cancer (CMS/HCC)    • CHF (congestive heart failure) (CMS/HCC)    • Coagulopathy (CMS/HCC) 8/22/2020   • Depression    • Diabetes mellitus (CMS/HCC)    • GERD (gastroesophageal reflux disease)    • Hypertension    • Kidney stones    • Macular degeneration    • Neuromuscular disorder (CMS/HCC)    • Thyroid goiter    , Past Surgical History:   Procedure Laterality Date   • BLADDER SURGERY     • CATARACT EXTRACTION Bilateral    • CATARACT EXTRACTION     • CRANIOTOMY FOR SUBDURAL HEMATOMA Right 8/21/2020    Procedure: Right-sided craniotomy for subdural;  Surgeon: Logan Delgado MD;  Location: [facility];  Service: Neurosurgery;  Laterality: Right;   • THYROIDECTOMY     • TOTAL THYROIDECTOMY     , Family History   Problem Relation Age of Onset   • Heart disease Mother     • No Known Problems Father    • Prostate cancer Brother    , Social History     Tobacco Use   • Smoking status: Former Smoker     Types: Cigarettes   • Smokeless tobacco: Never Used   Substance Use Topics   • Alcohol use: No   • Drug use: No   , Medications Prior to Admission   Medication Sig Dispense Refill Last Dose   • allopurinol (ZYLOPRIM) 100 MG tablet Take 1 tablet by mouth 2 (Two) Times a Day. 180 tablet 3 Taking   • apixaban (ELIQUIS) 5 MG tablet tablet Take 1 tablet by mouth Every 12 (Twelve) Hours. 180 tablet 2 Taking   • B Complex Vitamins (VITAMIN B COMPLEX PO) Take  by mouth.   Taking   • citalopram (CeleXA) 20 MG tablet TAKE 1 TABLET BY MOUTH EVERY DAY 90 tablet 1    • colesevelam (WELCHOL) 3.75 g pack pack Take  by mouth Daily.   Taking   • finasteride (PROSCAR) 5 MG tablet TAKE 1 TABLET BY MOUTH EVERY DAY 90 tablet 1    • fluticasone (FLONASE) 50 MCG/ACT nasal spray 2 sprays into the nostril(s) as directed by provider Daily. 3 bottle 3    • furosemide (LASIX) 20 MG tablet Take 1 tablet by mouth Daily. Take 1 tab in the morning then another at 2 pm 180 tablet 2 Taking   • glimepiride (AMARYL) 1 MG tablet TAKE 1 TABLET BY MOUTH EVERY DAY BEFORE BREAKFAST 90 tablet 1    • glucose blood (ONE TOUCH ULTRA TEST) test strip USE AS DIRECTED TO TEST BLOOD SUGAR 1-2 TIMES DAILY. DX E11.9 100 each 3    • latanoprost (XALATAN) 0.005 % ophthalmic solution    Taking   • meloxicam (MOBIC) 15 MG tablet Take 1 tablet by mouth Daily. 90 tablet 3    • ONETOUCH DELICA LANCETS 33G misc USE AS DIRECTED TO TEST BLOOD SUGAR ONCE DAILY. E11.9 100 each 3 Taking   • pantoprazole (PROTONIX) 40 MG EC tablet Take 40 mg by mouth As Needed.   Taking   • potassium chloride (KLOR-CON) 10 MEQ CR tablet Take 1 tablet by mouth Daily. 90 tablet 2 Taking   • Saw Palmetto 450 MG capsule Take  by mouth.   Taking   • SYNTHROID 125 MCG tablet Take 1 tablet by mouth Daily. 90 tablet 3    • timolol (TIMOPTIC) 0.5 % ophthalmic solution  Administer 0.5 drops to both eyes Daily  99 Taking   , Scheduled Meds:    fentaNYL 1 patch Transdermal Q72H   And      [START ON 9/2/2020] Check Fentanyl Patch Placement 1 each Does not apply Q12H   citalopram 10 mg Oral Nightly   finasteride 5 mg Oral Nightly   furosemide 20 mg Oral BID   glipizide 2.5 mg Oral QAM AC   insulin regular 0-9 Units Subcutaneous 4x Daily AC & at Bedtime   latanoprost 1 drop Both Eyes Daily   levETIRAcetam 500 mg Oral Q12H   levothyroxine 125 mcg Oral Q AM   lidocaine 3 patch Transdermal Q24H   lisinopril 5 mg Oral Q24H   PHENobarbital 30 mg Intravenous Nightly   sodium chloride 10 mL Intravenous Q12H   tamsulosin 0.4 mg Oral Daily   timolol 1 drop Both Eyes Daily   , Continuous Infusions:    lactated ringers 75 mL/hr Last Rate: 75 mL/hr (08/31/20 2310)   , PRN Meds:  •  acetaminophen **OR** acetaminophen  •  dextrose  •  dextrose  •  glucagon (human recombinant)  •  lidocaine  •  magnesium sulfate **OR** magnesium sulfate **OR** magnesium sulfate  •  [DISCONTINUED] HYDROmorphone **AND** naloxone  •  ondansetron **OR** ondansetron  •  potassium chloride **OR** potassium chloride **OR** potassium chloride  •  [COMPLETED] Insert peripheral IV **AND** sodium chloride  •  sodium chloride, Allergies:  Digoxin and related and Keflex [cephalexin] and     Objective     Vital Signs   Temp:  [97.5 °F (36.4 °C)-98.6 °F (37 °C)] 98.3 °F (36.8 °C)  Heart Rate:  [] 97  Resp:  [16-17] 16  BP: ()/(46-94) 107/94    Physical Exam:     General Appearance:    Alert, cooperative, in no acute distress   Head:    Normocephalic, without obvious abnormality, atraumatic   Eyes:            Lids and lashes normal, conjunctivae and sclerae normal, no   icterus, no pallor, corneas clear, PERRLA   Ears:    Ears appear intact with no abnormalities noted   Throat:   No oral lesions, no thrush, oral mucosa moist   Neck:   No adenopathy, supple, trachea midline,    Back:     No kyphosis present, no scoliosis  present, no skin lesions,      erythema or scars, no tenderness to percussion or                   palpation,   range of motion normal   Lungs:     Clear to auscultation,respirations regular, even and                  unlabored    Heart:    Regular rhythm and normal rate, normal S1 and S2, no            murmur, no gallop, no rub, no click   Chest Wall:    No abnormalities observed   Abdomen:     Normal bowel sounds, no masses, no organomegaly, soft        non-tender, non-distended, no guarding, no rebound                tenderness   Genital/Rectal:    Essentially normal, noncircumcised.   Extremities:   Moves all extremities well, no edema, no cyanosis, no             redness       Skin:   No bleeding, bruising or rash       Neurologic:   Cranial nerves 2 - 12 grossly intact, sensation intact,       Results Review:   I reviewed the patient's new clinical results.      Assessment/Plan       SDH (subdural hematoma) (CMS/HCC)    Head injury due to trauma    Traumatic subdural hematoma without loss of consciousness (CMS/HCC)    Coagulopathy (CMS/HCC)      Urinary retention  History of BPH  History of bladder cancer  Gross hematuria    I discussed the patients findings and my recommendations with patient, family, nursing staff and We have decided to place a catheter for now for control of his hematuria and retention.    Ted Fletcher MD  09/01/20  18:04    Time: 40+ reviewing records, history and physical, discussion of plans

## 2020-09-01 NOTE — MBS/VFSS/FEES
Acute Care - Speech Language Pathology   Swallow Initial Evaluation [facility]     Patient Name: Haris Sweeney  : 1929  MRN: [id number]  Today's Date: 2020               Admit Date: 2020    Visit Dx:     ICD-10-CM ICD-9-CM   1. SDH (subdural hematoma) (CMS/[facility]) S06.5X9A 432.1   2. Fall, initial encounter W19.XXXA E888.9   3. Closed fracture of tenth thoracic vertebra, unspecified fracture morphology, initial encounter (CMS/[facility]) S22.079A 805.2   4. Closed fracture of multiple ribs of both sides, initial encounter S22.43XA 807.09   5. Closed fracture of sacrum, unspecified portion of sacrum, initial encounter (CMS/[facility]) S32.10XA 805.6     Patient Active Problem List   Diagnosis   • Non-insulin dependent type 2 diabetes mellitus (CMS/[facility])   • Generalized osteoarthritis of multiple sites   • Depression   • A-fib (CMS/[facility])   • Essential hypertension   • Hypothyroidism   • Chronic gout   • GERD (gastroesophageal reflux disease)   • Encounter for prostate cancer screening   • Bilateral carpal tunnel syndrome   • Chronic anticoagulation   • Paresthesia of both hands   • Medicare annual wellness visit, subsequent   • Bilateral hand pain   • Traumatic subarachnoid hemorrhage with loss of consciousness of 30 minutes or less (CMS/[facility])   • Fall at home   • Chronic combined systolic and diastolic congestive heart failure (CMS/[facility])   • Hospital discharge follow-up   • Orthostatic hypotension   • Weakness generalized   • C. difficile colitis   • Skin tear of left forearm without complication   • SDH (subdural hematoma) (CMS/[facility])   • Head injury due to trauma   • Traumatic subdural hematoma without loss of consciousness (CMS/[facility])   • Coagulopathy (CMS/[facility])     Past Medical History:   Diagnosis Date   • A-fib (CMS/[facility])    • Arthritis    • Bladder cancer (CMS/HCC)    • CHF (congestive heart failure) (CMS/[facility])    • Coagulopathy (CMS/[facility]) 2020   • Depression    • Diabetes mellitus (CMS/[facility])    • GERD  (gastroesophageal reflux disease)    • Hypertension    • Kidney stones    • Macular degeneration    • Neuromuscular disorder (CMS/HCC)    • Thyroid goiter      Past Surgical History:   Procedure Laterality Date   • BLADDER SURGERY     • CATARACT EXTRACTION Bilateral    • CATARACT EXTRACTION     • CRANIOTOMY FOR SUBDURAL HEMATOMA Right 8/21/2020    Procedure: Right-sided craniotomy for subdural;  Surgeon: Logan Delgado MD;  Location: [facility];  Service: Neurosurgery;  Laterality: Right;   • THYROIDECTOMY     • TOTAL THYROIDECTOMY          SWALLOW EVALUATION (last 72 hours)      SLP Adult Swallow Evaluation     Row Name 09/01/20 1500 09/01/20 1200 08/31/20 0800             Rehab Evaluation    Document Type  --  evaluation  -SA  re-evaluation  -KA      Subjective Information  --  no complaints  -SA  no complaints  -KA      Patient Observations  --  alert;cooperative weak voice  -SA  alert;cooperative  -KA      Patient/Family Observations  --  --  No family present   -KA      Patient Effort  --  good  -SA  good  -KA      Symptoms Noted During/After Treatment  --  none  -SA  none  -KA         General Information    Patient Profile Reviewed  --  yes  -SA  yes  -KA      Current Method of Nutrition  --  pureed;nectar/syrup-thick liquids  -SA  NPO  -KA      Precautions/Limitations, Hearing  --  --  WFL;for purposes of eval  -KA      Prior Level of Function-Communication  --  --  other (see comments) family not present for hx  -KA      Prior Level of Function-Swallowing  --  --  mechanical soft textures;thin liquids  -KA      Plans/Goals Discussed with  --  patient;agreed upon  -SA  patient;agreed upon  -KA      Barriers to Rehab  --  medically complex  -SA  medically complex  -KA      Patient's Goals for Discharge  --  return to regular diet  -SA  return to PO diet  -KA         Pain Scale: FACES Pre/Post-Treatment    Pain: FACES Scale, Pretreatment  --  --  4-->hurts little more  -KA      Pain: FACES Scale,  Post-Treatment  --  --  4-->hurts little more  -KA      Pre/Post Treatment Pain Comment  --  --  side pain   -KA         Oral Motor and Function    Dentition Assessment  --  --  other (see comments) unable to locate dentures in room, some lower dentition   -      Secretion Management  --  --  WNL/WFL  -KA      Mucosal Quality  --  --  moist, healthy  -KA      Volitional Swallow  --  --  weak  -KA      Volitional Cough  --  --  weak  -KA         Oral Musculature and Cranial Nerve Assessment    Oral Motor General Assessment  --  --  generalized oral motor weakness  -KA         General Eating/Swallowing Observations    Respiratory Support Currently in Use  --  --  room air  -KA      Eating/Swallowing Skills  --  --  fed by SLP  -KA      Positioning During Eating  --  --  upright in bed  -KA      Utensils Used  --  --  spoon;cup  -KA      Consistencies Trialed  --  --  thin liquids;pureed;nectar/syrup-thick liquids  -         Clinical Swallow Eval    Oral Prep Phase  --  --  WFL  -KA      Oral Transit  --  --  impaired  -KA      Oral Residue  --  --  WFL  -KA      Pharyngeal Phase  --  --  suspected pharyngeal impairment  -KA      Clinical Swallow Evaluation Summary  --  --  Patient awake and alert responding appropriately to simple questions. Patient reported side pain and refused to sit up 90 degrees for eval, pt approximately at 70 degrees. No overt s/s of pen/asp with ice chips. Spoonful sips of water patient demonstrated throat clearing, no overt s/s of pen/asp with puree and NTL. Patient demonstrated inconsistent multiple swallows with all tested trials, laryngeal elevation reduced upon neck palpation  -         MBS/VFSS Interpretation    Oral Prep Phase  other (see comments) solids not trialed d/t weakness, absent upper denture  -SA  --  --      Oral Transit Phase  impaired  -SA  --  --      Oral Residue  WFL  -SA  --  --         Oral Transit Phase    Impaired Oral Transit Phase  premature spillage of  liquids into pharynx  -SA  --  --      Premature Spillage of Liquids into Pharynx  thin liquids;nectar-thick liquids;pudding/puree  -SA  --  --         Initiation of Pharyngeal Swallow    Pharyngeal Phase  impaired pharyngeal phase of swallowing  -SA  --  --      Penetration During the Swallow  thin liquids;other (see comments) inconsistent w/ cup; trace x1 w/ nectar  -SA  --  --      Aspiration During the Swallow  thin liquids;other (see comments) straw  -SA  --  --      Response to Penetration  no response  -SA  --  --      Response to Aspiration  no response, silent aspiration  -SA  --  --      Pharyngeal Residue  valleculae;base of tongue;all consistencies tested;other (see comments) mild-moderate  -SA  --  --      Response to Residue  unable to clear residue  -SA  --  --         SLP Communication to Radiology    Summary Statement  Patient demonstrated a mild to moderate oropharyngeal dysphagia characterized by premature spillage and mistiming of the swallow.  Thin liquids showed aspiration by straw and inconsistent penetration by cup.  Nectar thick with trace penetration noted on one occasion.  Mild to moderate vallecular residue observed across consistencies.  Solids not trialed due to absence of upper dentures, aphonia, and weakness.  -SA  --  --         Clinical Impression    SLP Swallowing Diagnosis  mild-moderate  -SA  --  oral dysfunction;suspected pharyngeal dysfunction  -KA      Functional Impact  risk of aspiration/pneumonia  -SA  --  risk of aspiration/pneumonia;risk of malnutrition;risk of dehydration  -KA      Rehab Potential/Prognosis, Swallowing  good, to achieve stated therapy goals  -SA  --  good, to achieve stated therapy goals  -KA      Swallow Criteria for Skilled Therapeutic Interventions Met  demonstrates skilled criteria  -SA  --  demonstrates skilled criteria  -KA         Recommendations    Therapy Frequency (Swallow)  PRN  -SA  --  PRN  -KA      Predicted Duration Therapy Intervention  (Days)  until discharge  -  --  until discharge  -      SLP Diet Recommendation  puree;nectar thick liquids  -  --  puree;nectar thick liquids;water between meals after oral care, with supervision;ice chips between meals after oral care, with supervision  -      Recommended Diagnostics  reassess via [place]  -  --  [place]  -      Recommended Precautions and Strategies  upright posture during/after eating;small bites of food and sips of liquid  -  --  upright posture during/after eating;small bites of food and sips of liquid;no straw  -      SLP Rec. for Method of Medication Administration  with pudding or applesauce  -  --  meds whole;meds crushed;with pudding or applesauce;as tolerated  -      Monitor for Signs of Aspiration  notify SLP if any concerns  -  --  yes;notify SLP if any concerns  -      Anticipated Dischage Disposition (SLP)  anticipate therapy at next level of care  -  --  anticipate therapy at next level of care  -         Swallow Goals (SLP)    Oral Nutrition/Hydration Goal Selection (SLP)  --  --  oral nutrition/hydration, SLP goal 1  -        User Key  (r) = Recorded By, (t) = Taken By, (c) = Cosigned By    Initials Name Effective Dates    Amy Owen MS [facility]-SLP 03/07/18 -     Carl Rodas MA,[facility]-SLP 06/08/18 -           EDUCATION  The patient has been educated in the following areas:   Dysphagia (Swallowing Impairment) Modified Diet Instruction.    SLP Recommendation and Plan  SLP Swallowing Diagnosis: mild-moderate  SLP Diet Recommendation: puree, nectar thick liquids  Recommended Precautions and Strategies: upright posture during/after eating, small bites of food and sips of liquid  SLP Rec. for Method of Medication Administration: with pudding or applesauce     Monitor for Signs of Aspiration: notify SLP if any concerns  Recommended Diagnostics: reassess via VFSS ([facility])  Swallow Criteria for Skilled Therapeutic Interventions Met: demonstrates  skilled criteria  Anticipated Dischage Disposition (SLP): anticipate therapy at next level of care  Rehab Potential/Prognosis, Swallowing: good, to achieve stated therapy goals  Therapy Frequency (Swallow): PRN  Predicted Duration Therapy Intervention (Days): until discharge       Plan of Care Reviewed With: patient  Outcome Summary: VFSS completed.  Pt with inconsistent penetration/trace aspiration with thin by cup and straw.  Premature spillage, mistiming, and reduced pharyngeal strength impair swallow safety.  REC continue puree diet with nectar thick liquids.  Meds with puree.  Water protocol w/ supervision after oral care.  Pt upright with all po.         SLP Outcome Measures (last 72 hours)      SLP Outcome Measures     Row Name 09/01/20 1500 08/31/20 0900          SLP Outcome Measures    Outcome Measure Used?  Adult NOMS  -SA  Adult NOMS  -        Adult FCM Scores    FCM Chosen  --  Swallowing  -KA     Swallowing FCM Score  4  -SA  4  -KA       User Key  (r) = Recorded By, (t) = Taken By, (c) = Cosigned By    Initials Name Effective Dates    Amy Owen MS CCC-SLP 03/07/18 -     Carl Rodas MA, CCC-SLP 06/08/18 -            Time Calculation:   Time Calculation- SLP     Row Name 09/01/20 1546             Time Calculation- SLP    SLP Start Time  1200  -      SLP Received On  09/01/20  -        User Key  (r) = Recorded By, (t) = Taken By, (c) = Cosigned By    Initials Name Provider Type    Amy Owen MS CCC-SLP Speech and Language Pathologist          Therapy Charges for Today     Code Description Service Date Service Provider Modifiers Qty    [id number] HC ST MOTION FLUORO EVAL SWALLOW 5 9/1/2020 Amy Zapata MS CCC-SLP GN 1               Amy Zapata MS CCC-DANIELA  9/1/2020

## 2020-09-01 NOTE — NURSING NOTE
[facility] Acute Inpt Rehab Note     Received referral for acute inpt rehab evaluation.  Eval in progress.  Will update CCP tomorrow when eval is complete.    Thanks,   Rachel Avila RN  Rehab Admission Nurse   [phone]

## 2020-09-01 NOTE — PROGRESS NOTES
"DOS: 2020  NAME: Haris Sweeney   : 1929  PCP: Jesus Saez MD  Chief Complaint   Patient presents with   • Fall     Patient states he was in the kitchen and had a fall.  Patient is unsure if he became dizzy or lost his balance.  Patient was ambulating with a cane instead of his usual walker.  Patient denies any loss of consciousness, chest pain, heart palpitations prior to his fall.  The fall was witnessed by his wife who states he was alert and oriented the entire time.  Patient did fall backwards and hit his head as well as right lateral ribs.  Patient is anticoagulated on Eliquis for atrial fibrillation.         Subjective:   -Stable overnight, no issue to address by the caring nurse.  -No further seizure reported during his current hospital course.  -Still having pain despite patient on lidocaine patch and Norco.    Vital signs: /94   Pulse 97   Temp 98.3 °F (36.8 °C) (Oral)   Resp 16   Ht 162.6 cm (64.02\")   Wt 59.2 kg (130 lb 9.6 oz)   SpO2 96%   BMI 22.41 kg/m²      Gen: Lying in bed, does not look right, he is more lethargic and in pain.    MS: Sleepy, arousable, comprehending, oriented.  CN: Grossly intact from 2-12.  Vision: Normal on both eyes.  Pupils: Normal in size and reactive.  Motor: Age-appropriate.  Sensory: Intact.  Coordination: Appropriate.  Gait: Checked due to his condition but he was walking with a PT.          Laboratory results:  Lab Results   Component Value Date    GLUCOSE 141 (H) 2020    CALCIUM 8.9 2020     2020    K 4.2 2020    CO2 28.8 2020     (H) 2020    BUN 18 2020    CREATININE 0.60 (L) 2020    EGFRIFAFRI 150 2020    EGFRIFNONA 126 2020    BCR 30.0 (H) 2020    ANIONGAP 7.2 2020     Lab Results   Component Value Date    WBC 9.65 2020    HGB 10.2 (L) 2020    HCT 31.0 (L) 2020    MCV 99.0 (H) 2020     2020     Lab Results "   Component Value Date    CHOL 137 08/21/2020     Lab Results   Component Value Date    HDL 46 08/21/2020    HDL 49 10/05/2016     Lab Results   Component Value Date    LDL 77 08/21/2020     (H) 10/05/2016     Lab Results   Component Value Date    TRIG 69 08/21/2020    TRIG 92 10/05/2016     @hgba1c@     Review of labs: No significant abnormality.    Review and interpretation of imaging: No new imaging.      Assessment:  1.  Secondary seizure due to subdural hematoma, no further seizure reported.  Patient on dual antiepileptic drug.    2.  Patient is still in pain due to the rib fracture on the trauma he sustained.  He is on lidocaine patches and Norco but not enough patient looks groggy and sleepy but in pain.  3.  Genitourinary problem and urology on board.    Plan:  1.  He may benefit from fentanyl patch 25 mcg to replace Norco.  I will leave the lidocaine patch and will not replace it after.  DC Norco.  2.  Continue with antiseizure medication.  Patient to follow-up with outpatient neurology in 6 to 8 weeks.  3.  DC neuro service, call with a question or concern.

## 2020-09-01 NOTE — PROGRESS NOTES
Continued Stay Note  [facility]     Patient Name: Haris Sweeney  MRN: [id number]  Today's Date: 9/1/2020    Admit Date: 8/20/2020    Discharge Plan     Row Name 09/01/20 0933       Plan    Plan  Referral to [facility] acute rehab.     Patient/Family in Agreement with Plan  yes    Plan Comments  CCP spoke with pt's Vianey hayden by phone this am to discuss dc planning.  Family does not really want pt to go to skilled facility due to visiting limitations.  They would like referral to [facility] acute rehab.  CCP will follow. Tamar Capellan RN        Discharge Codes    No documentation.             Tamar Capellan RN

## 2020-09-01 NOTE — NURSING NOTE
Bladder scan 710ml. This RN performed in/out catheter, some resistance noted. Initial urine output noted to be bloody, followed by cloudy yellow urine. RN noted small to moderate size clot and blood coming out of penis, around catheter. Dr. Harry notified, rec'd orders to consult Dr. Fletcher.

## 2020-09-01 NOTE — THERAPY TREATMENT NOTE
Patient Name: Haris Sweeney  : 1929    MRN: [id number]                              Today's Date: 2020       Admit Date: 2020    Visit Dx:     ICD-10-CM ICD-9-CM   1. SDH (subdural hematoma) (CMS/[facility]) S06.5X9A 432.1   2. Fall, initial encounter W19.XXXA E888.9   3. Closed fracture of tenth thoracic vertebra, unspecified fracture morphology, initial encounter (CMS/HCC) S22.079A 805.2   4. Closed fracture of multiple ribs of both sides, initial encounter S22.43XA 807.09   5. Closed fracture of sacrum, unspecified portion of sacrum, initial encounter (CMS/HCC) S32.10XA 805.6     Patient Active Problem List   Diagnosis   • Non-insulin dependent type 2 diabetes mellitus (CMS/[facility])   • Generalized osteoarthritis of multiple sites   • Depression   • A-fib (CMS/[facility])   • Essential hypertension   • Hypothyroidism   • Chronic gout   • GERD (gastroesophageal reflux disease)   • Encounter for prostate cancer screening   • Bilateral carpal tunnel syndrome   • Chronic anticoagulation   • Paresthesia of both hands   • Medicare annual wellness visit, subsequent   • Bilateral hand pain   • Traumatic subarachnoid hemorrhage with loss of consciousness of 30 minutes or less (CMS/[facility])   • Fall at home   • Chronic combined systolic and diastolic congestive heart failure (CMS/[facility])   • Hospital discharge follow-up   • Orthostatic hypotension   • Weakness generalized   • C. difficile colitis   • Skin tear of left forearm without complication   • SDH (subdural hematoma) (CMS/[facility])   • Head injury due to trauma   • Traumatic subdural hematoma without loss of consciousness (CMS/[facility])   • Coagulopathy (CMS/[facility])     Past Medical History:   Diagnosis Date   • A-fib (CMS/[facility])    • Arthritis    • Bladder cancer (CMS/[facility])    • CHF (congestive heart failure) (CMS/[facility])    • Coagulopathy (CMS/[facility]) 2020   • Depression    • Diabetes mellitus (CMS/[facility])    • GERD (gastroesophageal reflux disease)    • Hypertension    • Kidney stones     • Macular degeneration    • Neuromuscular disorder (CMS/HCC)    • Thyroid goiter      Past Surgical History:   Procedure Laterality Date   • BLADDER SURGERY     • CATARACT EXTRACTION Bilateral    • CATARACT EXTRACTION     • CRANIOTOMY FOR SUBDURAL HEMATOMA Right 8/21/2020    Procedure: Right-sided craniotomy for subdural;  Surgeon: Logan Delgado MD;  Location: [facility] MAIN OR;  Service: Neurosurgery;  Laterality: Right;   • THYROIDECTOMY     • TOTAL THYROIDECTOMY       General Information     Row Name 09/01/20 1139          PT Evaluation Time/Intention    Document Type  therapy note (daily note)  -     Mode of Treatment  physical therapy;individual therapy  -     Row Name 09/01/20 1139          General Information    Patient Profile Reviewed?  yes  -     Existing Precautions/Restrictions  fall  -     Row Name 09/01/20 1139          Cognitive Assessment/Intervention- PT/OT    Orientation Status (Cognition)  oriented to;person;place  -     Cognitive Assessment/Intervention Comment  Pt. more alert today and more responsive to questions; continues with 1-2 word responses  -[facility] Name 09/01/20 1139          Safety Issues, Functional Mobility    Impairments Affecting Function (Mobility)  balance;endurance/activity tolerance;strength;coordination;pain;range of motion (ROM)  -       User Key  (r) = Recorded By, (t) = Taken By, (c) = Cosigned By    Initials Name Provider Type     Ramonita Sylvester, PT Physical Therapist        Mobility     Row Name 09/01/20 1140          Bed Mobility Assessment/Treatment    Bed Mobility Assessment/Treatment  supine-sit;scooting/bridging;sit-supine  -     Scooting/Bridging [place] (Bed Mobility)  minimum assist (75% patient effort);nonverbal cues (demo/gesture);verbal cues;2 person assist  -     Supine-Sit [place] (Bed Mobility)  minimum assist (75% patient effort);1 person assist;verbal cues;nonverbal cues (demo/gesture)  -     Sit-Supine [place]  (Bed Mobility)  moderate assist (50% patient effort);nonverbal cues (demo/gesture);verbal cues;2 person assist  -     Assistive Device (Bed Mobility)  bed rails;head of bed elevated  -     Comment (Bed Mobility)  assist with trunk and LE back into bed due to fatigue; improved trunk control when sitting EOB  -     Row Name 09/01/20 1140          Sit-Stand Transfer    Sit-Stand [place] (Transfers)  minimum assist (75% patient effort);2 person assist;verbal cues;nonverbal cues (demo/gesture)  -     Assistive Device (Sit-Stand Transfers)  walker, front-wheeled  -     Row Name 09/01/20 1140          Gait/Stairs Assessment/Training    Gait/Stairs Assessment/Training  gait/ambulation independence  -     [place] (Gait)  verbal cues;nonverbal cues (demo/gesture);minimum assist (75% patient effort);2 person assist  -     Assistive Device (Gait)  walker, front-wheeled  -     Distance in Feet (Gait)  20'  -     Pattern (Gait)  step-to  -     Deviations/Abnormal Patterns (Gait)  festinating/shuffling;stride length decreased;gait speed decreased;paul decreased;antalgic;base of support, wide  -     Bilateral Gait Deviations  forward flexed posture;heel strike decreased;weight shift ability decreased  -     Comment (Gait/Stairs)  Improved trunk control and ability to turn rwx; less cues needed for positioning.   -       User Key  (r) = Recorded By, (t) = Taken By, (c) = Cosigned By    Initials Name Provider Type     Ramonita Sylvester PT Physical Therapist        Obj/Interventions     Row Name 09/01/20 1141          Static Sitting Balance    Level of [place] (Unsupported Sitting, Static Balance)  contact guard assist;minimal assist, 75% patient effort;1 person assist  -     Sitting Position (Unsupported Sitting, Static Balance)  sitting on edge of bed  -     Time Able to Maintain Position (Unsupported Sitting, Static Balance)  more than 5 minutes  -       User Key  (r) =  Recorded By, (t) = Taken By, (c) = Cosigned By    Initials Name Provider Type     Ramonita Sylvester, PT Physical Therapist        Goals/Plan    No documentation.       Clinical Impression     Row Name 09/01/20 1142          Pain Scale: Numbers Pre/Post-Treatment    Pain Intervention(s)  Repositioned;Ambulation/increased activity  -MH     Row Name 09/01/20 1142          Pain Scale: FACES Pre/Post-Treatment    Pain: FACES Scale, Pretreatment  4-->hurts little more  -     Pain: FACES Scale, Post-Treatment  6-->hurts even more  -MH     Row Name 09/01/20 1142          Plan of Care Review    Plan of Care Reviewed With  patient  -     Outcome Summary  Pt. continues to deomnstrate improvement with mobility. Required minAx1 to come to sitting EOB, modAx2 to return to bed due to fatigue. Increased ambulation distance to 20' with rwx and minAx2. Pt. with improved trunk control (decreased L to left) and ability to control rwx. Pt. veyr forward flexed but able to look up when cued. Pt. overall continued to be limited by fatigue and pain. Pt. remains a good candidate for  rehab at D/C.  -[facility] 09/01/20 1142          Positioning and Restraints    Pre-Treatment Position  in bed  -     Post Treatment Position  bed  -     In Bed  supine;side lying left;with family/caregiver;encouraged to call for assist;call light within reach;exit alarm on  -       User Key  (r) = Recorded By, (t) = Taken By, (c) = Cosigned By    Initials Name Provider Type     Ramonita Sylvester, PT Physical Therapist        Outcome Measures     [facility] Name 09/01/20 1144          How much help from another person do you currently need...    Turning from your back to your side while in flat bed without using bedrails?  3  -MH     Moving from lying on back to sitting on the side of a flat bed without bedrails?  3  -MH     Moving to and from a bed to a chair (including a wheelchair)?  3  -MH     Standing up from a chair using your arms (e.g.,  wheelchair, bedside chair)?  3  -MH     Climbing 3-5 steps with a railing?  1  -MH     To walk in hospital room?  2  -MH     AM-PAC 6 Clicks Score (PT)  15  -MH       User Key  (r) = Recorded By, (t) = Taken By, (c) = Cosigned By    Initials Name Provider Type     Ramonita Sylvester, PT Physical Therapist        Physical Therapy Education                 Title: PT OT SLP Therapies (In Progress)     Topic: Physical Therapy (In Progress)     Point: Mobility training (In Progress)     Description:   Instruct learner(s) on safety and technique for assisting patient out of bed, chair or wheelchair.  Instruct in the proper use of assistive devices, such as walker, crutches, cane or brace.              Patient Friendly Description:   It's important to get you on your feet again, but we need to do so in a way that is safe for you. Falling has serious consequences, and your personal safety is the most important thing of all.        When it's time to get out of bed, one of us or a family member will sit next to you on the bed to give you support.     If your doctor or nurse tells you to use a walker, crutches, a cane, or a brace, be sure you use it every time you get out of bed, even if you think you don't need it.    Learning Progress Summary           Patient Acceptance, E,TB,D, VU,DU,NR by  at 9/1/2020 1145    Acceptance, E,TB,D, VU,DU,NR by  at 8/31/2020 1203    Acceptance, E,D, NR by PH at 8/30/2020 1136    Acceptance, E, NR by SI at 8/29/2020 1346    Comment:  upright posture sitting and standing    Acceptance, E,D, NR by SM at 8/27/2020 1317    Acceptance, E, NR by EM at 8/26/2020 1336    Acceptance, E, NR by AR at 8/25/2020 1418    Acceptance, E,TB, VU,NR by CS at 8/23/2020 1429    Acceptance, E,TB, VU,NR by CS at 8/22/2020 1208   Family Acceptance, E, NR by AR at 8/25/2020 1418                   Point: Home exercise program (In Progress)     Description:   Instruct learner(s) on appropriate technique for  monitoring, assisting and/or progressing patient with therapeutic exercises and activities.              Learning Progress Summary           Patient Acceptance, E,D, NR by  at 8/30/2020 1136    Acceptance, E, NR by SI at 8/29/2020 1346    Comment:  upright posture sitting and standing    Acceptance, E,D, NR by  at 8/27/2020 1317    Acceptance, E, NR by AR at 8/25/2020 1418    Acceptance, E,TB, VU,NR by CS at 8/23/2020 1429    Acceptance, E,TB, VU,NR by CS at 8/22/2020 1208   Family Acceptance, E, NR by AR at 8/25/2020 1418                   Point: Body mechanics (In Progress)     Description:   Instruct learner(s) on proper positioning and spine alignment for patient and/or caregiver during mobility tasks and/or exercises.              Learning Progress Summary           Patient Acceptance, E,TB,D, VU,DU,NR by  at 9/1/2020 1145    Acceptance, E,TB,D, VU,DU,NR by  at 8/31/2020 1203    Acceptance, E,D, NR by  at 8/30/2020 1136    Acceptance, E, NR by SI at 8/29/2020 1346    Comment:  upright posture sitting and standing    Acceptance, E,D, NR by  at 8/27/2020 1317    Acceptance, E, NR by AR at 8/25/2020 1418    Acceptance, E,TB, VU,NR by CS at 8/23/2020 1429    Acceptance, E,TB, VU,NR by CS at 8/22/2020 1208   Family Acceptance, E, NR by AR at 8/25/2020 1418                   Point: Precautions (In Progress)     Description:   Instruct learner(s) on prescribed precautions during mobility and gait tasks              Learning Progress Summary           Patient Acceptance, E,TB,D, VU,DU,NR by  at 9/1/2020 1145    Acceptance, E,TB,D, VU,DU,NR by  at 8/31/2020 1203    Acceptance, E,D, NR by  at 8/30/2020 1136    Acceptance, E, NR by SI at 8/29/2020 1346    Comment:  upright posture sitting and standing    Acceptance, E,D, NR by  at 8/27/2020 1317    Acceptance, E, NR by AR at 8/25/2020 1418    Acceptance, E,TB, VU,NR by CS at 8/23/2020 1429    Acceptance, E,TB, VU,NR by CS at 8/22/2020 1208   Family  Acceptance, E, NR by AR at 8/25/2020 1418                               User Key     Initials Effective Dates Name Provider Type Discipline    SI 05/18/15 -  Brooke Barbosa, PTA Physical Therapy Assistant PT    EM 04/03/18 -  Margaret Velasquez, PT Physical Therapist PT     03/07/18 -  Sophia Avila, PTA Physical Therapy Assistant PT    CS 05/14/18 -  Haris Duenas, PT Physical Therapist PT    PH 08/20/19 -  Ekaterina Shultz, PTA Physical Therapy Assistant PT     05/26/20 -  Ramonita Sylvester, PT Physical Therapist PT    AR 07/02/20 -  Jethro Escoto, PT Student PT Student PT              PT Recommendation and Plan     Outcome Summary/Treatment Plan (PT)  Anticipated Discharge Disposition (PT): inpatient rehabilitation facility, skilled nursing facility  Plan of Care Reviewed With: patient  Outcome Summary: Pt. continues to deomnstrate improvement with mobility. Required minAx1 to come to sitting EOB, modAx2 to return to bed due to fatigue. Increased ambulation distance to 20' with rwx and minAx2. Pt. with improved trunk control (decreased L to left) and ability to control rwx. Pt. veyr forward flexed but able to look up when cued. Pt. overall continued to be limited by fatigue and pain. Pt. remains a good candidate for IP rehab at D/C.     Time Calculation:   PT Charges     Row Name 09/01/20 1145             Time Calculation    Start Time  1055  -      Stop Time  1120  -      Time Calculation (min)  25 min  -      PT Received On  09/01/20  -      PT - Next Appointment  09/02/20  -         Time Calculation- PT    Total Timed Code Minutes- PT  24 minute(s)  -        User Key  (r) = Recorded By, (t) = Taken By, (c) = Cosigned By    Initials Name Provider Type     Ramonita Sylvester, PT Physical Therapist        Therapy Charges for Today     Code Description Service Date Service Provider Modifiers Qty    14500162773 HC PT THER SUPP EA 15 MIN 8/31/2020 Ramonita Sylvester, PT GP 1    67025777660  HC PT THERAPEUTIC ACT EA 15 MIN 8/31/2020 Ramonita Sylvester, PT GP 1    23314028097 HC PT THERAPEUTIC ACT EA 15 MIN 9/1/2020 Ramonita Sylvester, PT GP 1    07190481453 HC GAIT TRAINING EA 15 MIN 9/1/2020 Ramonita Sylvester, PT GP 1          PT G-Codes  Outcome Measure Options: AM-PAC 6 Clicks Daily Activity (OT)  AM-PAC 6 Clicks Score (PT): 15  AM-PAC 6 Clicks Score (OT): 12  Modified Bradford Scale: 4 - Moderately severe disability.  Unable to walk without assistance, and unable to attend to own bodily needs without assistance.    Ramonita Sylvester, PT  9/1/2020

## 2020-09-01 NOTE — PLAN OF CARE
Problem: Patient Care Overview  Goal: Plan of Care Review  Outcome: Ongoing (interventions implemented as appropriate)  Flowsheets (Taken 9/1/2020 3294)  Progress: no change  Plan of Care Reviewed With: patient  Outcome Summary: Vitals stable. No falls. Pt c/o pain in back, medicated per MAR. Straight cathed x1. Turned Q2. Resting comfortably. Monitoring closely.

## 2020-09-01 NOTE — PLAN OF CARE
Problem: Patient Care Overview  Goal: Plan of Care Review  Flowsheets  Taken 9/1/2020 1145  Plan of Care Reviewed With: patient  Taken 9/1/2020 1142  Outcome Summary: Pt. continues to deomnstrate improvement with mobility. Required minAx1 to come to sitting EOB, modAx2 to return to bed due to fatigue. Increased ambulation distance to 20' with rwx and minAx2. Pt. with improved trunk control (decreased L to left) and ability to control rwx. Pt. veyr forward flexed but able to look up when cued. Pt. overall continued to be limited by fatigue and pain. Pt. remains a good candidate for IP rehab at /.   Patient was wearing a face mask during this therapy encounter. Therapist used appropriate personal protective equipment including eye protection, mask, and gloves.  Mask used was standard procedure mask. Appropriate PPE was worn during the entire therapy session. Hand hygiene was completed before and after therapy session. Patient is not in enhanced droplet precautions.

## 2020-09-01 NOTE — PROGRESS NOTES
LOS: 12 days   Patient Care Team:  Jesus Saez MD as PCP - General (Internal Medicine)  Casper Torres MD as PCP - Claims Attributed    Subjective     Patient was able to talk to me a little bit today the only pain he is really having is in his back.  He did walk today with physical therapy and his pain is better when he is up walking.  He fed himself breakfast he just got back from swallow study is a little worn out he has not eaten lunch yet.    Review of Systems:         Objective     Vital Signs  Vital Sign Min/Max for last 24 hours  Temp  Min: 97.5 °F (36.4 °C)  Max: 98.6 °F (37 °C)   BP  Min: 70/46  Max: 108/67   Pulse  Min: 78  Max: 100   Resp  Min: 16  Max: 17   SpO2  Min: 93 %  Max: 97 %   No data recorded   Weight  Min: 59.2 kg (130 lb 9.6 oz)  Max: 59.2 kg (130 lb 9.6 oz)        Ventilator/Non-Invasive Ventilation Settings (From admission, onward)    None              Arterial Line BP: 113/55  Arterial Line MAP (mmHg): 74 mmHg     Body mass index is 22.41 kg/m².  I/O last 3 completed shifts:  In: 603 [P.O.:553; IV Piggyback:50]  Out: 2900 [Urine:2900]  No intake/output data recorded.        Physical Exam:  General Appearance: Elderly white male lying in bed in the fetal position he really does not look much different than yesterday although he was able to say hi doc  Eyes: Conjunctiva are clear and anicteric pupils look to be equal about 2 mm  ENT: Mucous membranes are extremely dry  Neck: Trachea midline no visible jugular venous distention  Lungs: Clear does not take the deepest breaths no wheezes no rales no rhonchi he is not labored  Cardiac: Regular rate rhythm  Abdomen: Soft no palpable hepatosplenomegaly or masses  : bladder distnded  Musculoskeletal: Moderate thoracic kyphosis, he has very little muscle mass  Skin: No jaundice no petechiae skin is warm and dry  Neuro: Patient is quite lethargic although a little more awake than yesterday he was at least able to say  something I could understand but he went right back to sleep.  Extremities/P Vascular: No clubbing no cyanosis no edema he has palpable radial and dorsalis pedis pulses  MSE: Unable to assess       Labs:  Results from last 7 days   Lab Units 09/01/20  0536 08/31/20  0606 08/30/20  0718 08/29/20 0344 08/28/20  0508 08/27/20  0531 08/26/20  0503   GLUCOSE mg/dL 141* 74 111* 153* 102* 236* 253*   SODIUM mmol/L 144 145 143 139 142 142 141   POTASSIUM mmol/L 4.2 3.4* 3.6 3.8 3.9 4.5 3.6   CO2 mmol/L 28.8 27.9 28.6 29.5* 32.7* 28.6 27.1   CHLORIDE mmol/L 108* 107 104 101 102 106 106   ANION GAP mmol/L 7.2 10.1 10.4 8.5 7.3 7.4 7.9   CREATININE mg/dL 0.60* 0.59* 0.53* 0.62* 0.66* 0.62* 0.62*   BUN mg/dL 18 24* 26* 36* 42* 35* 31*   BUN / CREAT RATIO  30.0* 40.7* 49.1* 58.1* 63.6* 56.5* 50.0*   CALCIUM mg/dL 8.9 9.1 9.0 9.2 9.1 8.7 8.7   EGFR IF NONAFRICN AM mL/min/1.73 126 129 146 122 113 122 122     Estimated Creatinine Clearance: 50.4 mL/min (A) (by C-G formula based on SCr of 0.6 mg/dL (L)).      Results from last 7 days   Lab Units 09/01/20  0536 08/31/20  0606 08/30/20 0718 08/29/20 0344 08/28/20  0508 08/27/20  0531 08/26/20  0503   WBC 10*3/mm3 9.65 8.13 9.73 13.19* 10.25 11.23* 10.99*   RBC 10*6/mm3 3.13* 3.18* 3.14* 3.39* 2.84* 2.73* 3.16*   HEMOGLOBIN g/dL 10.2* 10.6* 10.5* 11.1* 9.7* 9.2* 10.6*   HEMATOCRIT % 31.0* 32.0* 31.2* 35.0* 27.6* 27.1* 31.5*   MCV fL 99.0* 100.6* 99.4* 103.2* 97.2* 99.3* 99.7*   MCH pg 32.6 33.3* 33.4* 32.7 34.2* 33.7* 33.5*   MCHC g/dL 32.9 33.1 33.7 31.7 35.1 33.9 33.7   RDW % 14.0 13.6 13.7 13.7 13.6 13.5 13.5   RDW-SD fl 49.9 49.4 47.7 52.6 48.4 47.6 48.3   MPV fL 10.3 10.5 10.9 11.3 10.7 10.6 10.6   PLATELETS 10*3/mm3 235 236 218 203 214 200 183   NEUTROPHIL % % 63.4 64.4 69.7 70.2 67.7 77.3* 75.8   LYMPHOCYTE % % 15.0* 14.0* 11.9* 11.8* 9.6* 7.4* 9.0*   MONOCYTES % % 14.0* 12.9* 12.6* 10.8 14.7* 12.4* 13.0*   EOSINOPHIL % % 5.7 6.6* 3.5 5.2 6.1 1.3 0.5   BASOPHIL % % 0.6 0.6  0.7 0.5 0.4 0.4 0.5   IMM GRAN % % 1.3* 1.5* 1.6* 1.5* 1.5* 1.2* 1.2*   NEUTROS ABS 10*3/mm3 6.11 5.23 6.77 9.24* 6.94 8.69* 8.33*   LYMPHS ABS 10*3/mm3 1.45 1.14 1.16 1.56 0.98 0.83 0.99   MONOS ABS 10*3/mm3 1.35* 1.05* 1.23* 1.43* 1.51* 1.39* 1.43*   EOS ABS 10*3/mm3 0.55* 0.54* 0.34 0.69* 0.63* 0.15 0.06   BASOS ABS 10*3/mm3 0.06 0.05 0.07 0.07 0.04 0.04 0.05   IMMATURE GRANS (ABS) 10*3/mm3 0.13* 0.12* 0.16* 0.20* 0.15* 0.13* 0.13*   NRBC /100 WBC 0.2 0.4* 0.2 0.2 0.1 0.3* 0.4*                             Microbiology Results (last 10 days)     ** No results found for the last 240 hours. **                citalopram 10 mg Oral Nightly   finasteride 5 mg Oral Nightly   furosemide 20 mg Oral BID   glipizide 2.5 mg Oral QAM AC   insulin regular 0-9 Units Subcutaneous 4x Daily AC & at Bedtime   latanoprost 1 drop Both Eyes Daily   levETIRAcetam 500 mg Oral Q12H   levothyroxine 125 mcg Oral Q AM   lidocaine 3 patch Transdermal Q24H   lisinopril 5 mg Oral Q24H   PHENobarbital 30 mg Intravenous Nightly   sodium chloride 10 mL Intravenous Q12H   tamsulosin 0.4 mg Oral Daily   timolol 1 drop Both Eyes Daily       lactated ringers 75 mL/hr Last Rate: 75 mL/hr (08/31/20 2310)       Diagnostics:  Ct Head Without Contrast    Result Date: 8/27/2020  CT HEAD WO CONTRAST-  INDICATIONS: Intracranial hemorrhage, follow-up  TECHNIQUE: Radiation dose reduction techniques were utilized, including automated exposure control and exposure modulation based on body size. Noncontrast head CT  COMPARISON: 08/25/2020  FINDINGS:    Craniotomy changes are redemonstrated on the right, with mildly decreased pneumocephalus. Mixed density subdural collection along the right convexity, 3.4 x 9.7 cm on axial image 43, does not appear significant changed as measured at similar level on the prior exam, with similar local mass effect. Amount of subarachnoid blood on the right appears stable. Likewise, leftward midline shift does not appear  significantly changed, again measuring about 6 mm, axial image 33. Bilateral basal ganglia mineralization is present.  Ventricles, cisterns, cerebral sulci appear stable.  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 Partial opacification of bilateral mastoid air cells. Visualized paranasal sinuses, orbits, mastoid air cells are otherwise unremarkable.           Evolving postsurgical changes. Large mixed density subdural hematoma along the right convexity and right cerebral subarachnoid hemorrhage do not appear significant change with persistent leftward midline shift. Continued follow-up recommended.  This report was finalized on 8/27/2020 1:59 PM by Dr. Eleuterio Sigala M.D.      Ct Head Without Contrast    Result Date: 8/25/2020  CT OF THE HEAD WITHOUT CONTRAST  HISTORY: Subdural hematoma  COMPARISON: 08/24/2020  TECHNIQUE: Axial CT imaging was obtained through the brain. No IV contrast was administered.  FINDINGS: Large mixed density subdural collection is perhaps slightly decreased in size. It measures 10.3 x 2.7 cm, previously 10.3 x 3.1 cm. Degree of midline shift is unchanged. It measures approximately 5 to 6 mm. Areas of subarachnoid hemorrhage are again noted within the right cerebral hemisphere. These do not appear significantly changed. There is diffuse atrophy. There is periventricular and deep white matter microangiopathic change. Volume of pneumocephalus has areas.      Slight interval decrease in previously identified large mixed density right subdural collection. Midline shift remains stable. No new areas of hemorrhage are seen.  Radiation dose reduction techniques were utilized, including automated exposure control and exposure modulation based on body size.  This report was finalized on 8/25/2020 4:12 AM by Dr. Melody Acharya M.D.      Ct Head Without Contrast    Result Date: 8/24/2020  CT HEAD WITHOUT CONTRAST  HISTORY: Subdural hematoma  COMPARISON: 08/23/2020  TECHNIQUE: Axial CT imaging  was obtained through the brain. No IV contrast was administered.  FINDINGS: Since prior examination, the patient's right-sided subdural drain has been removed. The large mixed density subdural collection overlying the right cerebral convexity appears larger. It measures approximately 10.3 x 3.1 cm. Previously, it measured 10.2 x 2.5 cm. However, the degree of associated midline shift is stable at 5-6 mm. There is also subarachnoid hemorrhage seen within the right cerebral hemisphere. This appears stable. The amount of pneumocephalus has decreased when compared to the prior study. No new areas of hemorrhage are seen. 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 Similar findings were present in 2017.      Interval increase in the size of the patient's mixed density extra-axial collection overlying the right cerebral convexity. However, the degree of midline shift is stable at 5-6 millimeters. Areas of subarachnoid hemorrhage are also unchanged.  Radiation dose reduction techniques were utilized, including automated exposure control and exposure modulation based on body size.  This report was finalized on 8/24/2020 4:18 AM by Dr. Melody Acharya M.D.      Ct Head Without Contrast    Result Date: 8/23/2020  CT HEAD WITHOUT CONTRAST  HISTORY: Postop  COMPARISON: 08/22/2020  TECHNIQUE: Axial CT imaging was obtained through the brain. No IV contrast was administered.  FINDINGS: Patient is again noted to be status post right frontal craniotomy. There is a persistent extra-axial, heterogeneous collection. This measures 10.2 x 2.4 cm. This is thicker than on prior study. Near the vertex, this collection measures up to 2.4 cm. Previously, it measured 2.0 cm. Adjacent subarachnoid hemorrhage is noted. This has increased when compared to prior exam. Midline shift has also increased, measuring up to 5 mm. Previously, it measured 3 mm. There is a subdural drain which is unchanged in  position. Volume of pneumocephalus has decreased. There is some increased subdural hyperdense material which is seen more inferiorly when compared to prior exam. There is diffuse atrophy. 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       1. There has been an interval increase in the size of the mixed density collection which is seen deep to the craniotomy flap. It is resulting in increasing midline shift, which is now up to 5 mm, previously measuring 3 mm. The volume of subarachnoid hemorrhage adjacent to the collection has also increased slightly.  FINDINGS were relayed to the patient's nurse at 4:53 AM.  Radiation dose reduction techniques were utilized, including automated exposure control and exposure modulation based on body size.  This report was finalized on 8/23/2020 4:53 AM by Dr. Melody Acharya M.D.      Ct Head Without Contrast    Result Date: 8/22/2020  CT OF THE HEAD WITHOUT CONTRAST  HISTORY: Postop  COMPARISON: 08/21/2020  TECHNIQUE: Axial CT imaging was obtained through the brain. No IV contrast was administered  FINDINGS: The patient is status post right frontal craniotomy with evacuation of subdural hematoma. There is a mixed density subdural collection which appears smaller on the current study, now measuring 2.3 x 2.0 cm, previously measuring up to 10.9 x 3.5 cm. It has more hyperdense material within it than on prior exam, suggesting a more acute component. There is a drain identified within the subdural space. There is expected pneumocephalus. There is midline shift to 3 mm, which is improved when compared to prior scan. There is diffuse atrophy. There is periventricular and deep white matter microangiopathic change. There are bilateral basal ganglia calcifications. 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 Right mastoid air cells appear relatively underpneumatized. This may reflect changes of chronic mastoiditis/otitis media.      Since prior examination, the patient has  undergone right frontal craniotomy with evacuation of a right cerebral convexity subdural hematoma. On the current examination, the hematoma appears smaller, and midline shift has decreased, although there is a persistent more hyperdense collection, suggesting more acute hemorrhage.  Radiation dose reduction techniques were utilized, including automated exposure control and exposure modulation based on body size.  This report was finalized on 8/22/2020 3:40 AM by Dr. Melody Acharya M.D.      Ct Head Without Contrast    Result Date: 8/21/2020  CT OF THE BRAIN WITHOUT CONTRAST 8/21/2020  HISTORY:  Follow-up intracranial hemorrhage.  TECHNIQUE:  Axial images were obtained through the brain without intravenous contrast and compared to the previous study dated 8/20/2020.  FINDINGS:  Again seen is a large oval-shaped subdural hematoma over the right frontoparietal region which measures approximately 10.9 cm by 5.1 cm x 3.0 cm and appears largely unchanged from the previous study. Additional lower density subdural fluid is seen on the right also appearing stable. There is approximately 5 mm midline shift to the left which appear stable.  No new hemorrhage is seen. There is moderate diffuse atrophy.      1.  Large oval-shaped subdural hemorrhage with additional low-density subdural fluid on the right as described. 2. There is midline shift to the left. 3. Findings appear stable since the previous study of 8/20/2020.  Radiation dose reduction techniques were utilized, including automated exposure control and exposure modulation based on body size.  This report was finalized on 8/21/2020 4:35 PM by Dr. Parker Spicer M.D.      Ct Head Without Contrast    Result Date: 8/20/2020  CT HEAD WITHOUT CONTRAST  HISTORY: Head trauma, on blood thinners.  A noncontrasted CT examination of the brain was performed and compared to the CT examination of 04/18/2018.  FINDINGS: The prior CT examination demonstrated a hygroma overlying  the lateral and superolateral aspect of the right frontal lobe. There is now an area of subdural hemorrhage overlying the right frontal and parietal lobe superolaterally. This measures approximately 11 cm in AP dimension. It measures approximately 3 cm in maximum thickness as measured perpendicular to the inner table on the coronal reconstructions and approximately 5 cm in craniocaudal dimension as measured perpendicular to the inner table on the coronal reconstructions. There is approximately 5 mm of midline shift to the left which is new. There is no evidence of intra-axial hemorrhage or of acute infarction. Bone windows show no evidence of calvarial fracture.      Ovoid relatively well demarcated area of subdural hemorrhage overlying the right frontoparietal region measuring approximately 11 x 3 x 5 mm in size with 5 mm of midline shift to the left.  The above information was called to and discussed with Fracisco Rubio.    Radiation dose reduction techniques were utilized, including automated exposure control and exposure modulation based on body size.  This report was finalized on 8/20/2020 4:16 PM by Dr. Naseem Richardson M.D.      Ct Chest With Contrast    Result Date: 8/20/2020  CT CHEST, ABDOMEN, AND PELVIS WITH IV CONTRAST  HISTORY: Fall, right rib trauma  TECHNIQUE: Radiation dose reduction techniques were utilized, including automated exposure control and exposure modulation based on body size. 3 mm images were obtained through the chest, abdomen, and pelvis with IV contrast.  COMPARISON: None  FINDINGS:  Chest:    Small hiatal hernia is present. Mildly enlarged subcarinal node measures 1 cm in short axis dimension. There is no noncalcified hilar or axillary adenopathy by size criteria.  Bilateral gynecomastia is present. Heart is mild to moderately enlarged. There is moderate to extensive coronary calcification. No significant pericardial effusion is present.  There is no pulmonary consolidation, pleural  effusion or pneumothorax. No suspicious pulmonary nodules are visualized.  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  There are no findings of small bowel obstruction. The appendix is unremarkable.  The liver has a heterogenous enhancement pattern. Ill-defined nonmasslike peripheral hyperenhancement within the inferior aspect of the left hepatic lobe is likely perfusional. There is discrete hepatic portal venous shunt extending through the right hepatic lobe. The gallbladder, and spleen have an unremarkable postcontrast CT appearance.  The main pancreatic duct is mildly distended, measuring approximately 0.5 cm in diameter.  Subcentimeter hypodense renal lesions are too small to characterize. Larger cystic density lesions within the right kidney likely represent simple cysts. Indeterminate density lesions within the posterior aspect of the midpole bilateral kidneys measures 1.2 cm on the right and 1.9 cm on the left. There is no hydronephrosis. Symmetric stranding is present along the bilateral renal collecting systems.  Borderline enlarged tara hepatic node measures 1 cm in short axis dimension and is likely reactive.  There is asymmetric swelling and stranding overlying the left gluteal musculature. Irregular tubular contrast density extends through this area.  Colonic diverticulosis is present. Asymmetric free fluid is present along the inferior aspect of the right paracolic gutter and abuts the sigmoid colon. There is no free intraperitoneal air..  The bladder is moderate to severely distended.  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 There is a fracture extending through the syndesmophyte along the anterior aspect of the T10 vertebral body with a fracture plane extending through the T10 vertebral body and T9-10 disc space posteriorly.  Cortical irregularity is present within the right anterior fourth through ninth ribs and left third through seventh  ribs without a visualized fracture plane.  Cortical irregularity along the anterior aspect of the distal sacrum at S4 as well as the coccyx more inferiorly are present without discrete fracture planes visualized.      Impression: 1.  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 Further evaluation with MRI of the thoracic and lumbar spine is recommended to determine the stability of this fracture as well as evaluate for any occult injury given the ankylosis. 2.  Cortical irregularity of multiple bilateral ribs without discrete fracture planes representing fractures of indeterminate age and correlation with point tenderness is recommended. There is no pneumothorax or findings of pulmonary contusion. 3.  Intramuscular hematoma involving the left gluteal musculature with findings of contrast extravasation in this area. While findings are favored be venous in origin, underlying arterial injury cannot be excluded and continued close attention on follow-up of this area is recommended. Findings are further evaluated with CTA if clinically indicated. 4.  Fractures of indeterminate age involving the lower sacrum as detailed above. Findings can be further evaluated with MRI if clinically indicated. 5.  Small amount of free fluid is present within the right lower quadrant and abuts the sigmoid colon. While the colon has an otherwise unremarkable appearance, given the history of trauma, underlying bowel injury cannot be excluded and correlation with serial abdominal exams recommended to exclude this possibility. 6.  Indeterminate bilateral renal lesions. Follow-up with CT or ultrasound of the kidneys and 6 months is recommended to ensure stability. 7.  Other findings as above.  The above findings were discussed with Fracisco Rubio by telephone by Darrel Pool at 2:20 PM on 08/20/2020   .  This report was finalized on  8/20/2020 2:22 PM by Dr. Darrel Pool M.D.      Mri Brain With & Without Contrast    Result Date: 8/23/2020  MRI BRAIN WITH AND WITHOUT CONTRAST-  08/23/2020  HISTORY: Known subdural hematoma. Speech difficulty. Possible stroke.  TECHNIQUE: Multiple pre and postcontrast sagittal, axial and coronal images were obtained through the brain.  COMPARISON: There are no previous MRI studies available for review.  FINDINGS:  Again seen is a large mixed signal intensity hematoma in the right frontoparietal region measuring up to 10.9 cm x 2.6 cm in transverse dimensions x approximately 4.8 cm in oblique craniocaudal dimension. A drainage catheter is present within the subdural hematoma. There is approximately 5 mm midline shift to the left. There appears to be minimal subarachnoid hemorrhage in the right superior frontal region best seen on FLAIR series 8 images 21-24.  The diffusion-weighted images show no evidence of acute infarction. There is some prominent dural enhancement of the right cerebral hemisphere following gadolinium. No other enhancing lesions are seen.  The craniocervical junction and sella appear normal.      1. No evidence of acute infarction. 2. Large mixed signal intensity right subdural hematoma with slight midline shift to the left. This has been seen on previous CT scans. Tiny amount of subarachnoid hemorrhage is seen in the right cerebral hemisphere as well. 3. These findings were discussed with Dr. Cardenas.   This report was finalized on 8/23/2020 7:47 PM by Dr. Parker Spicer M.D.      Ct Abdomen Pelvis With Contrast    Result Date: 8/20/2020  CT CHEST, ABDOMEN, AND PELVIS WITH IV CONTRAST  HISTORY: Fall, right rib trauma  TECHNIQUE: Radiation dose reduction techniques were utilized, including automated exposure control and exposure modulation based on body size. 3 mm images were obtained through the chest, abdomen, and pelvis with IV contrast.  COMPARISON: None  FINDINGS:  Chest:    Small hiatal  hernia is present. Mildly enlarged subcarinal node measures 1 cm in short axis dimension. There is no noncalcified hilar or axillary adenopathy by size criteria.  Bilateral gynecomastia is present. Heart is mild to moderately enlarged. There is moderate to extensive coronary calcification. No significant pericardial effusion is present.  There is no pulmonary consolidation, pleural effusion or pneumothorax. No suspicious pulmonary nodules are visualized.  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  There are no findings of small bowel obstruction. The appendix is unremarkable.  The liver has a heterogenous enhancement pattern. Ill-defined nonmasslike peripheral hyperenhancement within the inferior aspect of the left hepatic lobe is likely perfusional. There is discrete hepatic portal venous shunt extending through the right hepatic lobe. The gallbladder, and spleen have an unremarkable postcontrast CT appearance.  The main pancreatic duct is mildly distended, measuring approximately 0.5 cm in diameter.  Subcentimeter hypodense renal lesions are too small to characterize. Larger cystic density lesions within the right kidney likely represent simple cysts. Indeterminate density lesions within the posterior aspect of the midpole bilateral kidneys measures 1.2 cm on the right and 1.9 cm on the left. There is no hydronephrosis. Symmetric stranding is present along the bilateral renal collecting systems.  Borderline enlarged tara hepatic node measures 1 cm in short axis dimension and is likely reactive.  There is asymmetric swelling and stranding overlying the left gluteal musculature. Irregular tubular contrast density extends through this area.  Colonic diverticulosis is present. Asymmetric free fluid is present along the inferior aspect of the right paracolic gutter and abuts the sigmoid colon. There is no free intraperitoneal air..  The bladder is moderate to severely distended.  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 There is a fracture extending through the syndesmophyte along the anterior aspect of the T10 vertebral body with a fracture plane extending through the T10 vertebral body and T9-10 disc space posteriorly.  Cortical irregularity is present within the right anterior fourth through ninth ribs and left third through seventh ribs without a visualized fracture plane.  Cortical irregularity along the anterior aspect of the distal sacrum at S4 as well as the coccyx more inferiorly are present without discrete fracture planes visualized.      Impression: 1.  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 Further evaluation with MRI of the thoracic and lumbar spine is recommended to determine the stability of this fracture as well as evaluate for any occult injury given the ankylosis. 2.  Cortical irregularity of multiple bilateral ribs without discrete fracture planes representing fractures of indeterminate age and correlation with point tenderness is recommended. There is no pneumothorax or findings of pulmonary contusion. 3.  Intramuscular hematoma involving the left gluteal musculature with findings of contrast extravasation in this area. While findings are favored be venous in origin, underlying arterial injury cannot be excluded and continued close attention on follow-up of this area is recommended. Findings are further evaluated with CTA if clinically indicated. 4.  Fractures of indeterminate age involving the lower sacrum as detailed above. Findings can be further evaluated with MRI if clinically indicated. 5.  Small amount of free fluid is present within the right lower quadrant and abuts the sigmoid colon. While the colon has an otherwise unremarkable appearance, given the history of trauma, underlying bowel injury cannot be excluded and  correlation with serial abdominal exams recommended to exclude this possibility. 6.  Indeterminate bilateral renal lesions. Follow-up with CT or ultrasound of the kidneys and 6 months is recommended to ensure stability. 7.  Other findings as above.  The above findings were discussed with Fracisco Rubio by telephone by Darrel Pool at 2:20 PM on 08/20/2020   .  This report was finalized on 8/20/2020 2:22 PM by Dr. Darrel Pool M.D.      Xr Abdomen Kub    Result Date: 8/25/2020  KUB  HISTORY: Feeding tube placement.  FINDINGS: A single view of the upper abdomen demonstrates a feeding tube in place with the tip at the gastroesophageal junction. This needs to be advanced. The above information was called to the patient's nurse.  This report was finalized on 8/25/2020 2:18 PM by Dr. Naseem Richardson M.D.      Results for orders placed during the hospital encounter of 04/17/17   Adult Transthoracic Echo Complete With Contrast    Narrative · Left ventricular function is normal. Estimated EF = 55%.  · Right ventricular cavity is mildly dilated.  · Mildly reduced right ventricular systolic function noted.  · Left atrial cavity size is moderate-to-severely dilated.  · Mild aortic valve regurgitation is present.  · Mild-to-moderate mitral valve regurgitation is present  · Mild aortic valve stenosis is present.              Active Hospital Problems    Diagnosis  POA   • Head injury due to trauma [S09.90XA]  Unknown   • Traumatic subdural hematoma without loss of consciousness (CMS/HCC) [S06.5X0A]  Unknown   • Coagulopathy (CMS/HCC) [D68.9]  Unknown   • SDH (subdural hematoma) (CMS/HCC) [S06.5X9A]  Yes      Resolved Hospital Problems   No resolved problems to display.         Assessment/Plan     1. Traumatic right subdural hematoma with brain compression and without loss of consciousness.  Patient is status post craniotomy for decompression.  Neurosurgery following at this time from their standpoint he could transfer to rehab when  other problems are stable  2. Seizure disorder no new seizures have been noted because of his significant lethargy and no seizures decreasing the neuroleptics has apparently helped wake him up some and he has not had any seizure activity will continue to monitor, neurology's help appreciated  3. Coagulopathy on admission secondary to Eliquis  4. Vertebral compression fractures he is having some pain continue the Lidoderm patch because he is just starting to finally wake up I am hesitant to give him significant narcotics.  5. Left gluteal intramuscular hematoma  6. Lower sacral fracture  7. Indeterminate bilateral kidney lesions these will need to follow-up  in a few months.  8. Atrial fibrillation had been on Eliquis now being held secondary to-fall and brain bleed  9. Chronic CHF on chronic therapy   10. hypothyroidism on replacement  11. Hypertension blood pressure appears to be adequately controlled  12. Diabetes mellitus type 2 but sugars appear to be adequately controlled  13. Dementia  14. Bladder carcinoma  15. Urinary retention we will add some Flomax straight cath PRN  16. Anemia mild hemoglobin actually improving  17. Oral pharyngeal dysphagia apparently he did somewhat better today and speech therapy is recommended trying purée and nectar thick liquids they are going to repeat a video swallow study tomorrow so I guess feeding tube is on hold.  18. Pain patient has lots of reasons for pain unfortunately looks like even tiny doses of morphine knock him out and this is not really going to be very good option he is just started to wake up he complains of pain and then he gets a narcotic and he is out again I think we really have to try and do without the morphine and just try and use the low moderate dose hydrocodone orally and continue the Lidoderm he does seem to think it helps    Plan for disposition:     Ajay Harry MD  09/01/20  13:20    Time:

## 2020-09-02 NOTE — PLAN OF CARE
Pt participated in OT session with ADL tasks seated on EOB, CGA/SBA alternating use of BUE during grooming task with set up. C/O of rib pain with increased activity. Stood 1-2 mins with hand held x 2 for balance. Pt will continue to benefit from skilled OT services prior to d/c to acute inpatient rehab to regain independence to return home with Assistance.     Patient WAS NOT wearing a face mask during this therapy encounter. Therapist used appropriate personal protective equipment including mask, gloves and eye protection.  Mask used was standard procedure mask. Appropriate PPE was worn during the entire therapy session. Hand hygiene was completed before and after therapy session. Patient is not in enhanced droplet precautions.

## 2020-09-02 NOTE — PROGRESS NOTES
LOS: 13 days   Patient Care Team:  Jesus Saez MD as PCP - General (Internal Medicine)  Casper Torres MD as PCP - Claims Attributed    Subjective     Patient is talking more today he wants his pain controlled he wants his constipation relieved.  He told us that he was ready to be with God.  Palliative care had talked with him his daughter and wife prior to my arrival.  Apparently the daughter is the healthcare surrogate.  His wife does not disagree with this.  Was his wife and his son who had requested that he be made back a full code.  His wife does not really fully understand the whole situation the daughter talked with her and she seemed to be agreeable with what ever the daughter thought was best.  When the patient told that she seemed better to.  Nursing reports that his blood pressure has dropped particularly since the increased fentanyl patch.  Patient has had lots of pain in his back and his ribs he says he just wants to be out of pain.  Nursing has removed the fentanyl patch because of his hypotension.  He has had a suppository with no effect he is asking for a enema.    I talked with the daughter this is sort of the situation we worry an earlier if we give him enough medication that he is completely comfortable then we started having complications he is less responsive we have swallowing issues which can lead to pneumonia we have low blood pressure etc. the pain medications also probably are a major cause of the constipation and probably some of his urinary retention.  She seems to understand the situation pretty well she talked with the patient was present along with the wife and as above he wants pain his primary treatment and he wants to to get his constipation relieved it is clear he is not really looking for long-term survival he is looking to be comfortable.  The daughter is going to talk to her brothers and the rest of the family possibly moving him to palliative  care.  Review of Systems:         Objective     Vital Signs  Vital Sign Min/Max for last 24 hours  Temp  Min: 97.2 °F (36.2 °C)  Max: 97.9 °F (36.6 °C)   BP  Min: 81/52  Max: 127/94   Pulse  Min: 78  Max: 89   Resp  Min: 16  Max: 16   SpO2  Min: 94 %  Max: 100 %   No data recorded   Weight  Min: 61 kg (134 lb 6.4 oz)  Max: 61 kg (134 lb 6.4 oz)        Ventilator/Non-Invasive Ventilation Settings (From admission, onward)    None              Arterial Line BP: 113/55  Arterial Line MAP (mmHg): 74 mmHg     Body mass index is 23.06 kg/m².  I/O last 3 completed shifts:  In: 485 [P.O.:485]  Out: 2700 [Urine:2700]  I/O this shift:  In: 975 [I.V.:975]  Out: -         Physical Exam:  General Appearance: Elderly white male lying in bed in the fetal position he really does not look much different than he has the last several days.  He is able to talk a little bit more today  Eyes: Conjunctiva are clear and anicteric pupils look to be equal about 3 mm  ENT: Mucous membranes are extremely dry post craniotomy changes the incision looks to be healing well no erythema no drainage  Neck: Trachea midline no visible jugular venous distention  Lungs: Clear does not take the deepest breaths no wheezes no rales no rhonchi he is not labored  Cardiac: Regular rate rhythm heart rates in the 80s  Abdomen: Soft no palpable hepatosplenomegaly or masses does complain of a little tenderness on the left side  : bladder distended  Musculoskeletal: Moderate thoracic kyphosis, he has very little muscle mass  Skin: No jaundice no petechiae skin is warm and dry  Neuro: He is certainly more awake today he is very weak  Extremities/P Vascular: No clubbing no cyanosis no edema he has palpable radial and dorsalis pedis pulses  MSE: Very flat affect       Labs:  Results from last 7 days   Lab Units 09/02/20  0457 09/01/20  0536 08/31/20  0606 08/30/20  0718 08/29/20  0344 08/28/20  0508 08/27/20  0531   GLUCOSE mg/dL 88 141* 74 111* 153* 102* 236*    SODIUM mmol/L 139 144 145 143 139 142 142   POTASSIUM mmol/L 3.6 4.2 3.4* 3.6 3.8 3.9 4.5   CO2 mmol/L 27.5 28.8 27.9 28.6 29.5* 32.7* 28.6   CHLORIDE mmol/L 104 108* 107 104 101 102 106   ANION GAP mmol/L 7.5 7.2 10.1 10.4 8.5 7.3 7.4   CREATININE mg/dL 0.54* 0.60* 0.59* 0.53* 0.62* 0.66* 0.62*   BUN mg/dL 15 18 24* 26* 36* 42* 35*   BUN / CREAT RATIO  27.8* 30.0* 40.7* 49.1* 58.1* 63.6* 56.5*   CALCIUM mg/dL 8.5 8.9 9.1 9.0 9.2 9.1 8.7   EGFR IF NONAFRICN AM mL/min/1.73 143 126 129 146 122 113 122     Estimated Creatinine Clearance: 51.9 mL/min (A) (by C-G formula based on SCr of 0.54 mg/dL (L)).      Results from last 7 days   Lab Units 09/02/20  0457 09/01/20  0536 08/31/20  0606 08/30/20  0718 08/29/20  0344 08/28/20  0508 08/27/20  0531   WBC 10*3/mm3 13.38* 9.65 8.13 9.73 13.19* 10.25 11.23*   RBC 10*6/mm3 3.24* 3.13* 3.18* 3.14* 3.39* 2.84* 2.73*   HEMOGLOBIN g/dL 10.8* 10.2* 10.6* 10.5* 11.1* 9.7* 9.2*   HEMATOCRIT % 32.7* 31.0* 32.0* 31.2* 35.0* 27.6* 27.1*   MCV fL 100.9* 99.0* 100.6* 99.4* 103.2* 97.2* 99.3*   MCH pg 33.3* 32.6 33.3* 33.4* 32.7 34.2* 33.7*   MCHC g/dL 33.0 32.9 33.1 33.7 31.7 35.1 33.9   RDW % 14.0 14.0 13.6 13.7 13.7 13.6 13.5   RDW-SD fl 51.7 49.9 49.4 47.7 52.6 48.4 47.6   MPV fL 10.6 10.3 10.5 10.9 11.3 10.7 10.6   PLATELETS 10*3/mm3 213 235 236 218 203 214 200   NEUTROPHIL % % 74.3 63.4 64.4 69.7 70.2 67.7 77.3*   LYMPHOCYTE % % 10.5* 15.0* 14.0* 11.9* 11.8* 9.6* 7.4*   MONOCYTES % % 10.2 14.0* 12.9* 12.6* 10.8 14.7* 12.4*   EOSINOPHIL % % 3.1 5.7 6.6* 3.5 5.2 6.1 1.3   BASOPHIL % % 0.6 0.6 0.6 0.7 0.5 0.4 0.4   IMM GRAN % % 1.3* 1.3* 1.5* 1.6* 1.5* 1.5* 1.2*   NEUTROS ABS 10*3/mm3 9.94* 6.11 5.23 6.77 9.24* 6.94 8.69*   LYMPHS ABS 10*3/mm3 1.41 1.45 1.14 1.16 1.56 0.98 0.83   MONOS ABS 10*3/mm3 1.37* 1.35* 1.05* 1.23* 1.43* 1.51* 1.39*   EOS ABS 10*3/mm3 0.41* 0.55* 0.54* 0.34 0.69* 0.63* 0.15   BASOS ABS 10*3/mm3 0.08 0.06 0.05 0.07 0.07 0.04 0.04   IMMATURE GRANS (ABS) 10*3/mm3  0.17* 0.13* 0.12* 0.16* 0.20* 0.15* 0.13*   NRBC /100 WBC 0.1 0.2 0.4* 0.2 0.2 0.1 0.3*                             Microbiology Results (last 10 days)     ** No results found for the last 240 hours. **                fentaNYL 1 patch Transdermal Q72H   And      [START ON 9/3/2020] Check Fentanyl Patch Placement 1 each Does not apply Q12H   citalopram 10 mg Oral Nightly   finasteride 5 mg Oral Nightly   furosemide 20 mg Oral BID   glipizide 2.5 mg Oral QAM AC   insulin regular 0-9 Units Subcutaneous 4x Daily AC & at Bedtime   latanoprost 1 drop Both Eyes Daily   levETIRAcetam 750 mg Oral Q12H   levothyroxine 125 mcg Oral Q AM   lidocaine 3 patch Transdermal Q24H   lisinopril 5 mg Oral Q24H   sodium chloride 10 mL Intravenous Q12H   tamsulosin 0.4 mg Oral Daily   timolol 1 drop Both Eyes Daily       lactated ringers 75 mL/hr Last Rate: 75 mL/hr (09/02/20 1418)       Diagnostics:  Ct Head Without Contrast    Result Date: 8/27/2020  CT HEAD WO CONTRAST-  INDICATIONS: Intracranial hemorrhage, follow-up  TECHNIQUE: Radiation dose reduction techniques were utilized, including automated exposure control and exposure modulation based on body size. Noncontrast head CT  COMPARISON: 08/25/2020  FINDINGS:    Craniotomy changes are redemonstrated on the right, with mildly decreased pneumocephalus. Mixed density subdural collection along the right convexity, 3.4 x 9.7 cm on axial image 43, does not appear significant changed as measured at similar level on the prior exam, with similar local mass effect. Amount of subarachnoid blood on the right appears stable. Likewise, leftward midline shift does not appear significantly changed, again measuring about 6 mm, axial image 33. Bilateral basal ganglia mineralization is present.  Ventricles, cisterns, cerebral sulci appear stable.  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 Partial opacification of bilateral mastoid air cells. Visualized paranasal sinuses, orbits, mastoid air cells  are otherwise unremarkable.           Evolving postsurgical changes. Large mixed density subdural hematoma along the right convexity and right cerebral subarachnoid hemorrhage do not appear significant change with persistent leftward midline shift. Continued follow-up recommended.  This report was finalized on 8/27/2020 1:59 PM by Dr. Eleuterio Sigala M.D.      Ct Head Without Contrast    Result Date: 8/25/2020  CT OF THE HEAD WITHOUT CONTRAST  HISTORY: Subdural hematoma  COMPARISON: 08/24/2020  TECHNIQUE: Axial CT imaging was obtained through the brain. No IV contrast was administered.  FINDINGS: Large mixed density subdural collection is perhaps slightly decreased in size. It measures 10.3 x 2.7 cm, previously 10.3 x 3.1 cm. Degree of midline shift is unchanged. It measures approximately 5 to 6 mm. Areas of subarachnoid hemorrhage are again noted within the right cerebral hemisphere. These do not appear significantly changed. There is diffuse atrophy. There is periventricular and deep white matter microangiopathic change. Volume of pneumocephalus has areas.      Slight interval decrease in previously identified large mixed density right subdural collection. Midline shift remains stable. No new areas of hemorrhage are seen.  Radiation dose reduction techniques were utilized, including automated exposure control and exposure modulation based on body size.  This report was finalized on 8/25/2020 4:12 AM by Dr. Melody Acharya M.D.      Ct Head Without Contrast    Result Date: 8/24/2020  CT HEAD WITHOUT CONTRAST  HISTORY: Subdural hematoma  COMPARISON: 08/23/2020  TECHNIQUE: Axial CT imaging was obtained through the brain. No IV contrast was administered.  FINDINGS: Since prior examination, the patient's right-sided subdural drain has been removed. The large mixed density subdural collection overlying the right cerebral convexity appears larger. It measures approximately 10.3 x 3.1 cm. Previously, it measured 10.2  x 2.5 cm. However, the degree of associated midline shift is stable at 5-6 mm. There is also subarachnoid hemorrhage seen within the right cerebral hemisphere. This appears stable. The amount of pneumocephalus has decreased when compared to the prior study. No new areas of hemorrhage are seen. 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 Similar findings were present in 2017.      Interval increase in the size of the patient's mixed density extra-axial collection overlying the right cerebral convexity. However, the degree of midline shift is stable at 5-6 millimeters. Areas of subarachnoid hemorrhage are also unchanged.  Radiation dose reduction techniques were utilized, including automated exposure control and exposure modulation based on body size.  This report was finalized on 8/24/2020 4:18 AM by Dr. Melody Acharya M.D.      Ct Head Without Contrast    Result Date: 8/23/2020  CT HEAD WITHOUT CONTRAST  HISTORY: Postop  COMPARISON: 08/22/2020  TECHNIQUE: Axial CT imaging was obtained through the brain. No IV contrast was administered.  FINDINGS: Patient is again noted to be status post right frontal craniotomy. There is a persistent extra-axial, heterogeneous collection. This measures 10.2 x 2.4 cm. This is thicker than on prior study. Near the vertex, this collection measures up to 2.4 cm. Previously, it measured 2.0 cm. Adjacent subarachnoid hemorrhage is noted. This has increased when compared to prior exam. Midline shift has also increased, measuring up to 5 mm. Previously, it measured 3 mm. There is a subdural drain which is unchanged in position. Volume of pneumocephalus has decreased. There is some increased subdural hyperdense material which is seen more inferiorly when compared to prior exam. There is diffuse atrophy. 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       1. There has been an interval increase in the size of the mixed density collection  which is seen deep to the craniotomy flap. It is resulting in increasing midline shift, which is now up to 5 mm, previously measuring 3 mm. The volume of subarachnoid hemorrhage adjacent to the collection has also increased slightly.  FINDINGS were relayed to the patient's nurse at 4:53 AM.  Radiation dose reduction techniques were utilized, including automated exposure control and exposure modulation based on body size.  This report was finalized on 8/23/2020 4:53 AM by Dr. Melody Acharya M.D.      Ct Head Without Contrast    Result Date: 8/22/2020  CT OF THE HEAD WITHOUT CONTRAST  HISTORY: Postop  COMPARISON: 08/21/2020  TECHNIQUE: Axial CT imaging was obtained through the brain. No IV contrast was administered  FINDINGS: The patient is status post right frontal craniotomy with evacuation of subdural hematoma. There is a mixed density subdural collection which appears smaller on the current study, now measuring 2.3 x 2.0 cm, previously measuring up to 10.9 x 3.5 cm. It has more hyperdense material within it than on prior exam, suggesting a more acute component. There is a drain identified within the subdural space. There is expected pneumocephalus. There is midline shift to 3 mm, which is improved when compared to prior scan. There is diffuse atrophy. There is periventricular and deep white matter microangiopathic change. There are bilateral basal ganglia calcifications. 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 Right mastoid air cells appear relatively underpneumatized. This may reflect changes of chronic mastoiditis/otitis media.      Since prior examination, the patient has undergone right frontal craniotomy with evacuation of a right cerebral convexity subdural hematoma. On the current examination, the hematoma appears smaller, and midline shift has decreased, although there is a persistent more hyperdense collection, suggesting more acute hemorrhage.  Radiation dose reduction techniques were  utilized, including automated exposure control and exposure modulation based on body size.  This report was finalized on 8/22/2020 3:40 AM by Dr. Melody Acharya M.D.      Ct Head Without Contrast    Result Date: 8/21/2020  CT OF THE BRAIN WITHOUT CONTRAST 8/21/2020  HISTORY:  Follow-up intracranial hemorrhage.  TECHNIQUE:  Axial images were obtained through the brain without intravenous contrast and compared to the previous study dated 8/20/2020.  FINDINGS:  Again seen is a large oval-shaped subdural hematoma over the right frontoparietal region which measures approximately 10.9 cm by 5.1 cm x 3.0 cm and appears largely unchanged from the previous study. Additional lower density subdural fluid is seen on the right also appearing stable. There is approximately 5 mm midline shift to the left which appear stable.  No new hemorrhage is seen. There is moderate diffuse atrophy.      1.  Large oval-shaped subdural hemorrhage with additional low-density subdural fluid on the right as described. 2. There is midline shift to the left. 3. Findings appear stable since the previous study of 8/20/2020.  Radiation dose reduction techniques were utilized, including automated exposure control and exposure modulation based on body size.  This report was finalized on 8/21/2020 4:35 PM by Dr. Parker Spicer M.D.      Ct Head Without Contrast    Result Date: 8/20/2020  CT HEAD WITHOUT CONTRAST  HISTORY: Head trauma, on blood thinners.  A noncontrasted CT examination of the brain was performed and compared to the CT examination of 04/18/2018.  FINDINGS: The prior CT examination demonstrated a hygroma overlying the lateral and superolateral aspect of the right frontal lobe. There is now an area of subdural hemorrhage overlying the right frontal and parietal lobe superolaterally. This measures approximately 11 cm in AP dimension. It measures approximately 3 cm in maximum thickness as measured perpendicular to the inner table on the  coronal reconstructions and approximately 5 cm in craniocaudal dimension as measured perpendicular to the inner table on the coronal reconstructions. There is approximately 5 mm of midline shift to the left which is new. There is no evidence of intra-axial hemorrhage or of acute infarction. Bone windows show no evidence of calvarial fracture.      Ovoid relatively well demarcated area of subdural hemorrhage overlying the right frontoparietal region measuring approximately 11 x 3 x 5 mm in size with 5 mm of midline shift to the left.  The above information was called to and discussed with Fracisco Rubio.    Radiation dose reduction techniques were utilized, including automated exposure control and exposure modulation based on body size.  This report was finalized on 8/20/2020 4:16 PM by Dr. Naseem Richardson M.D.      Ct Chest With Contrast    Result Date: 8/20/2020  CT CHEST, ABDOMEN, AND PELVIS WITH IV CONTRAST  HISTORY: Fall, right rib trauma  TECHNIQUE: Radiation dose reduction techniques were utilized, including automated exposure control and exposure modulation based on body size. 3 mm images were obtained through the chest, abdomen, and pelvis with IV contrast.  COMPARISON: None  FINDINGS:  Chest:    Small hiatal hernia is present. Mildly enlarged subcarinal node measures 1 cm in short axis dimension. There is no noncalcified hilar or axillary adenopathy by size criteria.  Bilateral gynecomastia is present. Heart is mild to moderately enlarged. There is moderate to extensive coronary calcification. No significant pericardial effusion is present.  There is no pulmonary consolidation, pleural effusion or pneumothorax. No suspicious pulmonary nodules are visualized.  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  There are no findings of small bowel obstruction. The appendix is unremarkable.  The liver has a heterogenous enhancement pattern. Ill-defined nonmasslike peripheral hyperenhancement within  the inferior aspect of the left hepatic lobe is likely perfusional. There is discrete hepatic portal venous shunt extending through the right hepatic lobe. The gallbladder, and spleen have an unremarkable postcontrast CT appearance.  The main pancreatic duct is mildly distended, measuring approximately 0.5 cm in diameter.  Subcentimeter hypodense renal lesions are too small to characterize. Larger cystic density lesions within the right kidney likely represent simple cysts. Indeterminate density lesions within the posterior aspect of the midpole bilateral kidneys measures 1.2 cm on the right and 1.9 cm on the left. There is no hydronephrosis. Symmetric stranding is present along the bilateral renal collecting systems.  Borderline enlarged tara hepatic node measures 1 cm in short axis dimension and is likely reactive.  There is asymmetric swelling and stranding overlying the left gluteal musculature. Irregular tubular contrast density extends through this area.  Colonic diverticulosis is present. Asymmetric free fluid is present along the inferior aspect of the right paracolic gutter and abuts the sigmoid colon. There is no free intraperitoneal air..  The bladder is moderate to severely distended.  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 There is a fracture extending through the syndesmophyte along the anterior aspect of the T10 vertebral body with a fracture plane extending through the T10 vertebral body and T9-10 disc space posteriorly.  Cortical irregularity is present within the right anterior fourth through ninth ribs and left third through seventh ribs without a visualized fracture plane.  Cortical irregularity along the anterior aspect of the distal sacrum at S4 as well as the coccyx more inferiorly are present without discrete fracture planes visualized.      Impression: 1.  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 Further evaluation with MRI of the thoracic and lumbar spine is recommended to determine the stability of this fracture as well as evaluate for any occult injury given the ankylosis. 2.  Cortical irregularity of multiple bilateral ribs without discrete fracture planes representing fractures of indeterminate age and correlation with point tenderness is recommended. There is no pneumothorax or findings of pulmonary contusion. 3.  Intramuscular hematoma involving the left gluteal musculature with findings of contrast extravasation in this area. While findings are favored be venous in origin, underlying arterial injury cannot be excluded and continued close attention on follow-up of this area is recommended. Findings are further evaluated with CTA if clinically indicated. 4.  Fractures of indeterminate age involving the lower sacrum as detailed above. Findings can be further evaluated with MRI if clinically indicated. 5.  Small amount of free fluid is present within the right lower quadrant and abuts the sigmoid colon. While the colon has an otherwise unremarkable appearance, given the history of trauma, underlying bowel injury cannot be excluded and correlation with serial abdominal exams recommended to exclude this possibility. 6.  Indeterminate bilateral renal lesions. Follow-up with CT or ultrasound of the kidneys and 6 months is recommended to ensure stability. 7.  Other findings as above.  The above findings were discussed with Fracisco Rubio by telephone by Darrel oPol at 2:20 PM on 08/20/2020   .  This report was finalized on 8/20/2020 2:22 PM by Dr. Darrel Pool M.D.      Mri Brain With & Without Contrast    Result Date: 8/23/2020  MRI BRAIN WITH AND WITHOUT CONTRAST-  08/23/2020  HISTORY: Known subdural hematoma. Speech difficulty. Possible stroke.  TECHNIQUE: Multiple pre and postcontrast sagittal, axial and coronal images were obtained through the  brain.  COMPARISON: There are no previous MRI studies available for review.  FINDINGS:  Again seen is a large mixed signal intensity hematoma in the right frontoparietal region measuring up to 10.9 cm x 2.6 cm in transverse dimensions x approximately 4.8 cm in oblique craniocaudal dimension. A drainage catheter is present within the subdural hematoma. There is approximately 5 mm midline shift to the left. There appears to be minimal subarachnoid hemorrhage in the right superior frontal region best seen on FLAIR series 8 images 21-24.  The diffusion-weighted images show no evidence of acute infarction. There is some prominent dural enhancement of the right cerebral hemisphere following gadolinium. No other enhancing lesions are seen.  The craniocervical junction and sella appear normal.      1. No evidence of acute infarction. 2. Large mixed signal intensity right subdural hematoma with slight midline shift to the left. This has been seen on previous CT scans. Tiny amount of subarachnoid hemorrhage is seen in the right cerebral hemisphere as well. 3. These findings were discussed with Dr. Cardenas.   This report was finalized on 8/23/2020 7:47 PM by Dr. Parker Spicer M.D.      Ct Abdomen Pelvis With Contrast    Result Date: 8/20/2020  CT CHEST, ABDOMEN, AND PELVIS WITH IV CONTRAST  HISTORY: Fall, right rib trauma  TECHNIQUE: Radiation dose reduction techniques were utilized, including automated exposure control and exposure modulation based on body size. 3 mm images were obtained through the chest, abdomen, and pelvis with IV contrast.  COMPARISON: None  FINDINGS:  Chest:    Small hiatal hernia is present. Mildly enlarged subcarinal node measures 1 cm in short axis dimension. There is no noncalcified hilar or axillary adenopathy by size criteria.  Bilateral gynecomastia is present. Heart is mild to moderately enlarged. There is moderate to extensive coronary calcification. No significant pericardial effusion is  present.  There is no pulmonary consolidation, pleural effusion or pneumothorax. No suspicious pulmonary nodules are visualized.  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  There are no findings of small bowel obstruction. The appendix is unremarkable.  The liver has a heterogenous enhancement pattern. Ill-defined nonmasslike peripheral hyperenhancement within the inferior aspect of the left hepatic lobe is likely perfusional. There is discrete hepatic portal venous shunt extending through the right hepatic lobe. The gallbladder, and spleen have an unremarkable postcontrast CT appearance.  The main pancreatic duct is mildly distended, measuring approximately 0.5 cm in diameter.  Subcentimeter hypodense renal lesions are too small to characterize. Larger cystic density lesions within the right kidney likely represent simple cysts. Indeterminate density lesions within the posterior aspect of the midpole bilateral kidneys measures 1.2 cm on the right and 1.9 cm on the left. There is no hydronephrosis. Symmetric stranding is present along the bilateral renal collecting systems.  Borderline enlarged tara hepatic node measures 1 cm in short axis dimension and is likely reactive.  There is asymmetric swelling and stranding overlying the left gluteal musculature. Irregular tubular contrast density extends through this area.  Colonic diverticulosis is present. Asymmetric free fluid is present along the inferior aspect of the right paracolic gutter and abuts the sigmoid colon. There is no free intraperitoneal air..  The bladder is moderate to severely distended.  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 There is a fracture extending through the syndesmophyte along the anterior aspect of the T10 vertebral body with a fracture plane extending through the T10 vertebral body and T9-10 disc space posteriorly.  Cortical irregularity is present within the right anterior  fourth through ninth ribs and left third through seventh ribs without a visualized fracture plane.  Cortical irregularity along the anterior aspect of the distal sacrum at S4 as well as the coccyx more inferiorly are present without discrete fracture planes visualized.      Impression: 1.  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 Further evaluation with MRI of the thoracic and lumbar spine is recommended to determine the stability of this fracture as well as evaluate for any occult injury given the ankylosis. 2.  Cortical irregularity of multiple bilateral ribs without discrete fracture planes representing fractures of indeterminate age and correlation with point tenderness is recommended. There is no pneumothorax or findings of pulmonary contusion. 3.  Intramuscular hematoma involving the left gluteal musculature with findings of contrast extravasation in this area. While findings are favored be venous in origin, underlying arterial injury cannot be excluded and continued close attention on follow-up of this area is recommended. Findings are further evaluated with CTA if clinically indicated. 4.  Fractures of indeterminate age involving the lower sacrum as detailed above. Findings can be further evaluated with MRI if clinically indicated. 5.  Small amount of free fluid is present within the right lower quadrant and abuts the sigmoid colon. While the colon has an otherwise unremarkable appearance, given the history of trauma, underlying bowel injury cannot be excluded and correlation with serial abdominal exams recommended to exclude this possibility. 6.  Indeterminate bilateral renal lesions. Follow-up with CT or ultrasound of the kidneys and 6 months is recommended to ensure stability. 7.  Other findings as above.  The above findings were discussed with Fracisco Rubio by telephone by Darrel Pool at 2:20  PM on 08/20/2020   .  This report was finalized on 8/20/2020 2:22 PM by Dr. Darrel Pool M.D.      Xr Abdomen Kub    Result Date: 8/25/2020  KUB  HISTORY: Feeding tube placement.  FINDINGS: A single view of the upper abdomen demonstrates a feeding tube in place with the tip at the gastroesophageal junction. This needs to be advanced. The above information was called to the patient's nurse.  This report was finalized on 8/25/2020 2:18 PM by Dr. Naseem Richardson M.D.      Results for orders placed during the hospital encounter of 04/17/17   Adult Transthoracic Echo Complete With Contrast    Narrative · Left ventricular function is normal. Estimated EF = 55%.  · Right ventricular cavity is mildly dilated.  · Mildly reduced right ventricular systolic function noted.  · Left atrial cavity size is moderate-to-severely dilated.  · Mild aortic valve regurgitation is present.  · Mild-to-moderate mitral valve regurgitation is present  · Mild aortic valve stenosis is present.              Active Hospital Problems    Diagnosis  POA   • Head injury due to trauma [S09.90XA]  Unknown   • Traumatic subdural hematoma without loss of consciousness (CMS/HCC) [S06.5X0A]  Unknown   • Coagulopathy (CMS/HCC) [D68.9]  Unknown   • SDH (subdural hematoma) (CMS/HCC) [S06.5X9A]  Yes      Resolved Hospital Problems   No resolved problems to display.         Assessment/Plan     1. Traumatic right subdural hematoma with brain compression and without loss of consciousness.  Patient is status post craniotomy for decompression.  Neurosurgery following at this time from their standpoint he could transfer to rehab when other problems are stable  2. Seizure disorder no new seizures have been noted because of his significant lethargy and no seizures decreasing the neuroleptics has apparently helped wake him up some and he has not had any seizure activity will continue to monitor, neurology's help appreciated  3. Coagulopathy on admission secondary to  Eliquis  4. Vertebral compression fractures he is having some pain continue the Lidoderm patch because he is just starting to finally wake up I am hesitant to give him significant narcotics.  5. Left gluteal intramuscular hematoma  6. Lower sacral fracture  7. Indeterminate bilateral kidney lesions these will need to follow-up  in a few months.  8. Atrial fibrillation had been on Eliquis now being held secondary to-fall and brain bleed  9. Chronic CHF on chronic therapy   10. hypothyroidism on replacement  11. Hypertension blood pressure appears to be adequately controlled  12. Diabetes mellitus type 2 but sugars appear to be adequately controlled  13. Dementia  14. Bladder carcinoma  15. Urinary retention we will add some Flomax straight cath PRN  16. Anemia mild hemoglobin actually improving  17. Oral pharyngeal dysphagia apparently he did somewhat better today and speech therapy is recommended trying purée and nectar thick liquids they are going to repeat a video swallow study tomorrow so I guess feeding tube is on hold.  18. Pain patient has lots of reasons for pain unfortunately looks like even tiny doses of morphine knock him out and this is not really going to be very good option he is just started to wake up he complains of pain and then he gets a narcotic and he is out again I think we really have to try and do without the morphine and just try and use the low moderate dose hydrocodone orally and continue the Lidoderm he does seem to think it helps    Plan for disposition:     Ajay Harry MD  09/02/20  16:10    Time:            LOS: 13 days   Patient Care Team:  Jesus Saez MD as PCP - General (Internal Medicine)  Casper Torres MD as PCP - Claims Attributed    Subjective     Patient was able to talk to me a little bit today the only pain he is really having is in his back.  He did walk today with physical therapy and his pain is better when he is up walking.  He fed himself breakfast  he just got back from swallow study is a little worn out he has not eaten lunch yet.    Review of Systems:         Objective     Vital Signs  Vital Sign Min/Max for last 24 hours  Temp  Min: 97.2 °F (36.2 °C)  Max: 97.9 °F (36.6 °C)   BP  Min: 81/52  Max: 127/94   Pulse  Min: 78  Max: 89   Resp  Min: 16  Max: 16   SpO2  Min: 94 %  Max: 100 %   No data recorded   Weight  Min: 61 kg (134 lb 6.4 oz)  Max: 61 kg (134 lb 6.4 oz)        Ventilator/Non-Invasive Ventilation Settings (From admission, onward)    None              Arterial Line BP: 113/55  Arterial Line MAP (mmHg): 74 mmHg     Body mass index is 23.06 kg/m².  I/O last 3 completed shifts:  In: 485 [P.O.:485]  Out: 2700 [Urine:2700]  I/O this shift:  In: 975 [I.V.:975]  Out: -         Physical Exam:  General Appearance: Elderly white male lying in bed in the fetal position he really does not look much different than yesterday although he was able to say hi doc  Eyes: Conjunctiva are clear and anicteric pupils look to be equal about 2 mm  ENT: Mucous membranes are extremely dry  Neck: Trachea midline no visible jugular venous distention  Lungs: Clear does not take the deepest breaths no wheezes no rales no rhonchi he is not labored  Cardiac: Regular rate rhythm  Abdomen: Soft no palpable hepatosplenomegaly or masses  : bladder distnded  Musculoskeletal: Moderate thoracic kyphosis, he has very little muscle mass  Skin: No jaundice no petechiae skin is warm and dry  Neuro: Patient is quite lethargic although a little more awake than yesterday he was at least able to say something I could understand but he went right back to sleep.  Extremities/P Vascular: No clubbing no cyanosis no edema he has palpable radial and dorsalis pedis pulses  MSE: Unable to assess       Labs:  Results from last 7 days   Lab Units 09/02/20  0457 09/01/20  0536 08/31/20  0606 08/30/20  0718 08/29/20  0344 08/28/20  0508 08/27/20  0531   GLUCOSE mg/dL 88 141* 74 111* 153* 102* 236*    are otherwise unremarkable.           Evolving postsurgical changes. Large mixed density subdural hematoma along the right convexity and right cerebral subarachnoid hemorrhage do not appear significant change with persistent leftward midline shift. Continued follow-up recommended.  This report was finalized on 8/27/2020 1:59 PM by Dr. Eleuteroi Sigala M.D.      Ct Head Without Contrast    Result Date: 8/25/2020  CT OF THE HEAD WITHOUT CONTRAST  HISTORY: Subdural hematoma  COMPARISON: 08/24/2020  TECHNIQUE: Axial CT imaging was obtained through the brain. No IV contrast was administered.  FINDINGS: Large mixed density subdural collection is perhaps slightly decreased in size. It measures 10.3 x 2.7 cm, previously 10.3 x 3.1 cm. Degree of midline shift is unchanged. It measures approximately 5 to 6 mm. Areas of subarachnoid hemorrhage are again noted within the right cerebral hemisphere. These do not appear significantly changed. There is diffuse atrophy. There is periventricular and deep white matter microangiopathic change. Volume of pneumocephalus has areas.      Slight interval decrease in previously identified large mixed density right subdural collection. Midline shift remains stable. No new areas of hemorrhage are seen.  Radiation dose reduction techniques were utilized, including automated exposure control and exposure modulation based on body size.  This report was finalized on 8/25/2020 4:12 AM by Dr. Melody Acharya M.D.      Ct Head Without Contrast    Result Date: 8/24/2020  CT HEAD WITHOUT CONTRAST  HISTORY: Subdural hematoma  COMPARISON: 08/23/2020  TECHNIQUE: Axial CT imaging was obtained through the brain. No IV contrast was administered.  FINDINGS: Since prior examination, the patient's right-sided subdural drain has been removed. The large mixed density subdural collection overlying the right cerebral convexity appears larger. It measures approximately 10.3 x 3.1 cm. 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 Further evaluation with MRI of the thoracic and lumbar spine is recommended to determine the stability of this fracture as well as evaluate for any occult injury given the ankylosis. 2.  Cortical irregularity of multiple bilateral ribs without discrete fracture planes representing fractures of indeterminate age and correlation with point tenderness is recommended. There is no pneumothorax or findings of pulmonary contusion. 3.  Intramuscular hematoma involving the left gluteal musculature with findings of contrast extravasation in this area. While findings are favored be venous in origin, underlying arterial injury cannot be excluded and continued close attention on follow-up of this area is recommended. Findings are further evaluated with CTA if clinically indicated. 4.  Fractures of indeterminate age involving the lower sacrum as detailed above. Findings can be further evaluated with MRI if clinically indicated. 5.  Small amount of free fluid is present within the right lower quadrant and abuts the sigmoid colon. While the colon has an otherwise unremarkable appearance, given the history of trauma, underlying bowel injury cannot be excluded and correlation with serial abdominal exams recommended to exclude this possibility. 6.  Indeterminate bilateral renal lesions. Follow-up with CT or ultrasound of the kidneys and 6 months is recommended to ensure stability. 7.  Other findings as above.  The above findings were discussed with Fracisco Rubio by telephone by Darrel Pool at 2:20 PM on 08/20/2020   .  This report was finalized on 8/20/2020 2:22 PM by Dr. Darrel Pool M.D.      Mri Brain With & Without Contrast    Result Date: 8/23/2020  MRI BRAIN WITH AND WITHOUT CONTRAST-  08/23/2020  HISTORY: Known subdural hematoma. Speech difficulty. Possible stroke.  TECHNIQUE: Multiple pre and postcontrast sagittal, axial and coronal images were obtained through the  PM on 08/20/2020   .  This report was finalized on 8/20/2020 2:22 PM by Dr. Darrel Pool M.D.      Xr Abdomen Kub    Result Date: 8/25/2020  KUB  HISTORY: Feeding tube placement.  FINDINGS: A single view of the upper abdomen demonstrates a feeding tube in place with the tip at the gastroesophageal junction. This needs to be advanced. The above information was called to the patient's nurse.  This report was finalized on 8/25/2020 2:18 PM by Dr. Naseem Richardson M.D.      Results for orders placed during the hospital encounter of 04/17/17   Adult Transthoracic Echo Complete With Contrast    Narrative · Left ventricular function is normal. Estimated EF = 55%.  · Right ventricular cavity is mildly dilated.  · Mildly reduced right ventricular systolic function noted.  · Left atrial cavity size is moderate-to-severely dilated.  · Mild aortic valve regurgitation is present.  · Mild-to-moderate mitral valve regurgitation is present  · Mild aortic valve stenosis is present.              Active Hospital Problems    Diagnosis  POA   • Head injury due to trauma [S09.90XA]  Unknown   • Traumatic subdural hematoma without loss of consciousness (CMS/HCC) [S06.5X0A]  Unknown   • Coagulopathy (CMS/HCC) [D68.9]  Unknown   • SDH (subdural hematoma) (CMS/HCC) [S06.5X9A]  Yes      Resolved Hospital Problems   No resolved problems to display.         Assessment/Plan     19. Traumatic right subdural hematoma with brain compression and without loss of consciousness.  Patient is status post craniotomy for decompression.  Neurosurgery following at this time from their standpoint he could transfer to rehab when other problems are stable  20. Seizure disorder no new seizures have been noted because of his significant lethargy and no seizures decreasing the neuroleptics has apparently helped wake him up some and he has not had any seizure activity will continue to monitor, neurology's help appreciated  21. Coagulopathy on admission secondary to  Eliquis  22. Vertebral compression fractures he is having some pain continue the Lidoderm patch because he is just starting to finally wake up I am hesitant to give him significant narcotics.  23. Left gluteal intramuscular hematoma  24. Lower sacral fracture  25. Indeterminate bilateral kidney lesions these will need to follow-up  in a few months.  26. Atrial fibrillation had been on Eliquis now being held secondary to-fall and brain bleed  27. Chronic CHF on chronic therapy I have held his Lasix today we will have to watch closely  28. hypothyroidism on replacement  29. Hypertension blood pressure appears to be adequately controlled  30. Diabetes mellitus type 2 but sugars appear to be adequately controlled  31. Dementia  32. Bladder carcinoma  33. Urinary retention we will add some Flomax straight cath PRN  34. Anemia mild hemoglobin actually improving  35. Oral pharyngeal dysphagia speech is following he is taking some p.o. not enough yet.  36. Pain patient has lots of reasons for pain unfortunately he does not tolerate narcotic pain medications very well, it looks like even the fentanyl patch is causing hypotension course I think he is a little dry to organ to correct that but the daughter wants pain to be treated as the primary and so does the patient and he is now DO NOT INTUBATE DO NOT RESUSCITATE no aggressive therapy.  37. Constipation suppository and enemas as needed I think the narcotics are probably contributing along with the decreased mobility I am going to try a dose of Relistor  38. Hypotension I think he is a little dry I am going to hold his diuretic is antihypertensive and give him a little bit of fluid bolus and increase his maintenance fluids because he has been getting in probably less than ideal amount even though he is been on 75 cc an hour of LR.    Plan for disposition:     Ajay Harry MD  09/02/20  16:12    Time: Spent over 40 minutes on patient's care today

## 2020-09-02 NOTE — NURSING NOTE
[facility] Acute Inpt Rehab Note     Met with spouse and daughter today to discuss acute inpt rehab.  Patient was sleeping and never woke up during our conversation.  Daughter is a retired physical therapist and voiced concerns about whether her Dad would be able to even tolerate 3 hours of rehab a day.  Daughter and wife wished to talk with patient today about what he really wants.  Rehab Admissions Nurse provided daughter / wife with a business card and encouraged them to call with any questions or if they wished for Rehab Admissions to return.  Notified SILVIA Reyes, of the conversation.  We will continue to follow for the time being.      Thanks,   Rachel Avila RN  Rehab Admission Nurse   [phone]

## 2020-09-02 NOTE — PLAN OF CARE
Problem: Patient Care Overview  Goal: Plan of Care Review  Outcome: Ongoing (interventions implemented as appropriate)  Flowsheets (Taken 9/2/2020 8924)  Progress: no change  Plan of Care Reviewed With: patient  Outcome Summary: Vitals stable. No falls. Pt c/o generalized pain, fentanyl patch in place. Parks remains in place. Turned Q2. Resting comfortably. Monitoring closely.

## 2020-09-02 NOTE — THERAPY TREATMENT NOTE
Acute Care - Speech Language Pathology   Swallow Treatment Note [facility]     Patient Name: Haris Sweeney  : 1929  MRN: [id number]  Today's Date: 2020               Admit Date: 2020    Visit Dx:      ICD-10-CM ICD-9-CM   1. SDH (subdural hematoma) (CMS/[facility]) S06.5X9A 432.1   2. Fall, initial encounter W19.XXXA E888.9   3. Closed fracture of tenth thoracic vertebra, unspecified fracture morphology, initial encounter (CMS/[facility]) S22.079A 805.2   4. Closed fracture of multiple ribs of both sides, initial encounter S22.43XA 807.09   5. Closed fracture of sacrum, unspecified portion of sacrum, initial encounter (CMS/[facility]) S32.10XA 805.6     Patient Active Problem List   Diagnosis   • Non-insulin dependent type 2 diabetes mellitus (CMS/[facility])   • Generalized osteoarthritis of multiple sites   • Depression   • A-fib (CMS/[facility])   • Essential hypertension   • Hypothyroidism   • Chronic gout   • GERD (gastroesophageal reflux disease)   • Encounter for prostate cancer screening   • Bilateral carpal tunnel syndrome   • Chronic anticoagulation   • Paresthesia of both hands   • Medicare annual wellness visit, subsequent   • Bilateral hand pain   • Traumatic subarachnoid hemorrhage with loss of consciousness of 30 minutes or less (CMS/[facility])   • Fall at home   • Chronic combined systolic and diastolic congestive heart failure (CMS/[facility])   • Hospital discharge follow-up   • Orthostatic hypotension   • Weakness generalized   • C. difficile colitis   • Skin tear of left forearm without complication   • SDH (subdural hematoma) (CMS/[facility])   • Head injury due to trauma   • Traumatic subdural hematoma without loss of consciousness (CMS/[facility])   • Coagulopathy (CMS/[facility])       Therapy Treatment  Rehabilitation Treatment Summary     Row Name                Wound 20 Right lateral parietal region Incision    Wound - Properties Group Date first assessed: 20 [DG] Present on Hospital Admission: N [DG] Side: Right [DG]  Orientation: lateral [DG] Location: parietal region [DG] Primary Wound Type: Incision [DG] Recorded by:  [DG] Sandra Ziegler RN 08/21/20 1407    Row Name                Wound 08/29/20 1015 upper sacral spine Pressure Injury    Wound - Properties Group Date first assessed: 08/29/20 [SS] Time first assessed: 1015 [SS] Present on Hospital Admission: Y [SS], Assumed, did not do admission  Orientation: upper [SS] Location: sacral spine [SS] Primary Wound Type: Pressure inj [SS] Stage, Pressure Injury: deep tissue injury [SS], Bruised, unsure if PI  Additional Comments: Fell and has broken sacrum from prior to admission [SS] Recorded by:  [SS] Sophia Ariza RN 08/30/20 1150      User Key  (r) = Recorded By, (t) = Taken By, (c) = Cosigned By    Initials Name Effective Dates Discipline    SS Sophia Ariza RN 06/16/16 -  Nurse    Sandra Morales RN 02/14/20 -  Nurse          Outcome Summary         SLP GOALS     Row Name 09/02/20 0900             Oral Nutrition/Hydration Goal 1 (SLP)    Oral Nutrition/Hydration Goal 1, SLP  Pt will safely swallow any consistency of liquid and solid without overt s/s of penetration/aspiration to assess for readiness to initiate diet vs need of instrumental evaluation.   -KA      Time Frame (Oral Nutrition/Hydration Goal 1, SLP)  by discharge  -KA      Barriers (Oral Nutrition/Hydration Goal 1, SLP)  SLP fed patient breakfast this morning, patient demonstrated no overt s/s of pen.asp with 4oz of NTL and 4oz puree (ate some cream of wheat and boost pudding). SLP completed VFSS f/u and reviewed results of VFSS and rationale for current diet  -KA         Lingual Strengthening Goal 1 (SLP)    Activity (Lingual Strengthening Goal 1, SLP)  increase tongue back strength;increase lingual tone/sensation/control/coordination/movement  -KA      Increase Lingual Tone/Sensation/Control/Coordination/Movement  lingual movement exercises  -KA      Increase Tongue Back Strength  lingual movement  exercises  -KA      [place]/Accuracy (Lingual Strengthening Goal 1, SLP)  with minimal cues (75-90% accuracy)  -KA      Time Frame (Lingual Strengthening Goal 1, SLP)  short term goal (STG);by discharge  -KA      Barriers (Lingual Strengthening Goal 1, SLP)  Patient completed x5 lingual protrusion and lateralization and retraction exercises with mod visual cues   -KA         Pharyngeal Strengthening Exercise Goal 1 (SLP)    Activity (Pharyngeal Strengthening Goal 1, SLP)  increase squeeze/positive pressure generation;increase tongue base retraction  -KA      Increase Squeeze/Positive Pressure Generation  hard effortful swallow  -KA      Increase Tongue Base Retraction  yesi  -KA      [place]/Accuracy (Pharyngeal Strengthening Goal 1, SLP)  with minimal cues (75-90% accuracy)  -KA      Time Frame (Pharyngeal Strengthening Goal 1, SLP)  short term goal (STG);by discharge  -KA      Barriers (Pharyngeal Strengthening Goal 1, SLP)  Patient completed x5 effortful swallows with mod visual cues and unable to complete yesi   -KA        User Key  (r) = Recorded By, (t) = Taken By, (c) = Cosigned By    Initials Name Provider Type    Carl Rodas MA,CCC-SLP Speech and Language Pathologist          EDUCATION  The patient has been educated in the following areas:   Dysphagia (Swallowing Impairment).    SLP Recommendation and Plan             Patient was not wearing a face mask during this therapy encounter. Therapist used appropriate personal protective equipment including mask, eye protection and gloves.  Mask used was standard procedure mask. Appropriate PPE was worn during the entire therapy session. Hand hygiene was completed before and after therapy session. Patient is not in enhanced droplet precautions.                   SLP Outcome Measures (last 72 hours)      SLP Outcome Measures     Row Name 09/01/20 1500 08/31/20 0900          SLP Outcome Measures    Outcome Measure Used?  Adult NOMS  -SA  Adult  NOMS  -        Adult FCM Scores    FCM Chosen  --  Swallowing  -     Swallowing FCM Score  4  -SA  4  -KA       User Key  (r) = Recorded By, (t) = Taken By, (c) = Cosigned By    Initials Name Effective Dates    SA Amy Zapata, MS CCC-SLP 03/07/18 -     Carl Rodas MA,CCC-SLP 06/08/18 -              Time Calculation:   Time Calculation- SLP     Row Name 09/02/20 0935             Time Calculation- SLP    SLP Start Time  0815  -      SLP Received On  09/02/20  -        User Key  (r) = Recorded By, (t) = Taken By, (c) = Cosigned By    Initials Name Provider Type    Carl Rodas MA,CCC-SLP Speech and Language Pathologist          Therapy Charges for Today     Code Description Service Date Service Provider Modifiers Qty    [id number]  ST TREATMENT SWALLOW 4 9/2/2020 Carl Russ MA,CCC-SLP GN 1                 Carl Russ MA,CCC-SLP  9/2/2020

## 2020-09-02 NOTE — THERAPY TREATMENT NOTE
Inpatient Rehabilitation - Occupational Therapy Treatment Note  [facility]     Patient Name: Haris Sweeney  : 1929  MRN: [id number]  Today's Date: 2020             Admit Date: 2020       ICD-10-CM ICD-9-CM   1. SDH (subdural hematoma) (CMS/[facility]) S06.5X9A 432.1   2. Fall, initial encounter W19.XXXA E888.9   3. Closed fracture of tenth thoracic vertebra, unspecified fracture morphology, initial encounter (CMS/[facility]) S22.079A 805.2   4. Closed fracture of multiple ribs of both sides, initial encounter S22.43XA 807.09   5. Closed fracture of sacrum, unspecified portion of sacrum, initial encounter (CMS/[facility]) S32.10XA 805.6     Patient Active Problem List   Diagnosis   • Non-insulin dependent type 2 diabetes mellitus (CMS/[facility])   • Generalized osteoarthritis of multiple sites   • Depression   • A-fib (CMS/[facility])   • Essential hypertension   • Hypothyroidism   • Chronic gout   • GERD (gastroesophageal reflux disease)   • Encounter for prostate cancer screening   • Bilateral carpal tunnel syndrome   • Chronic anticoagulation   • Paresthesia of both hands   • Medicare annual wellness visit, subsequent   • Bilateral hand pain   • Traumatic subarachnoid hemorrhage with loss of consciousness of 30 minutes or less (CMS/[facility])   • Fall at home   • Chronic combined systolic and diastolic congestive heart failure (CMS/[facility])   • Hospital discharge follow-up   • Orthostatic hypotension   • Weakness generalized   • C. difficile colitis   • Skin tear of left forearm without complication   • SDH (subdural hematoma) (CMS/[facility])   • Head injury due to trauma   • Traumatic subdural hematoma without loss of consciousness (CMS/[facility])   • Coagulopathy (CMS/[facility])     Past Medical History:   Diagnosis Date   • A-fib (CMS/[facility])    • Arthritis    • Bladder cancer (CMS/[facility])    • CHF (congestive heart failure) (CMS/[facility])    • Coagulopathy (CMS/[facility]) 2020   • Depression    • Diabetes mellitus (CMS/[facility])    • GERD (gastroesophageal reflux  disease)    • Hypertension    • Kidney stones    • Macular degeneration    • Neuromuscular disorder (CMS/[facility])    • Thyroid goiter      Past Surgical History:   Procedure Laterality Date   • BLADDER SURGERY     • CATARACT EXTRACTION Bilateral    • CATARACT EXTRACTION     • CRANIOTOMY FOR SUBDURAL HEMATOMA Right 8/21/2020    Procedure: Right-sided craniotomy for subdural;  Surgeon: Logan Delgado MD;  Location: [facility] OR;  Service: Neurosurgery;  Laterality: Right;   • THYROIDECTOMY     • TOTAL THYROIDECTOMY         Therapy Treatment    Rehabilitation Treatment Summary     Row Name 09/02/20 0946             Treatment Time/Intention    Discipline  occupational therapist  -AF      Document Type  therapy note (daily note)  -AF      Subjective Information  complains of;pain  -AF      Mode of Treatment  occupational therapy  -AF      Patient Effort  good  -AF      Comment  supine in bed with CNA getting bath  -AF      Existing Precautions/Restrictions  fall  -AF      Recorded by [AF] Aileen Kang, OTR 09/02/20 0951      Row Name 09/02/20 0946             Cognitive Assessment/Intervention- PT/OT    Orientation Status (Cognition)  oriented to;person;place year, personal information  -AF      Follows Commands (Cognition)  follows one step commands;over 90% accuracy;increased processing time needed;verbal cues/prompting required  -AF      Cognitive Assessment/Intervention Comment  agreeable to therapy wanting to participate  -AF      Recorded by [AF] Aileen Kang, OTR 09/02/20 0951      Row Name 09/02/20 0946             Bed Mobility Assessment/Treatment    Bed Mobility Assessment/Treatment  rolling left;rolling right  -AF      Rolling Left [place] (Bed Mobility)  moderate assist (50% patient effort)  -AF      Rolling Right [place] (Bed Mobility)  moderate assist (50% patient effort) during ADL tasks   -AF      Supine-Sit [place] (Bed Mobility)  moderate assist (50% patient effort);verbal cues  secondary to rib pain  -AF      Sit-Supine [place] (Bed Mobility)  moderate assist (50% patient effort);verbal cues;2 person assist rib pain  -AF      Bed Mobility, Safety Issues  decreased use of arms for pushing/pulling;impaired trunk control for bed mobility  -AF      Assistive Device (Bed Mobility)  bed rails;draw sheet  -AF      Recorded by [AF] Aileen Kang, OTR 09/02/20 0951      Row Name 09/02/20 0946             Transfer Assessment/Treatment    Comment (Transfers)  sit to stand from EOB MIN x 2 vc's for technique and posture, stated that he was tired   -AF      Recorded by [AF] Aileen Kang, OTR 09/02/20 0951      Row Name 09/02/20 0946             ADL Assessment/Intervention    BADL Assessment/Intervention  upper body dressing;grooming  -AF      Recorded by [AF] Aileen Kang, OTR 09/02/20 0951      Row Name 09/02/20 0946             Upper Body Dressing Assessment/Training    Upper Body Dressing [place] Level  don;minimum assist (75% patient effort);verbal cues hosptial gown   -AF      Upper Body Dressing Position  long sitting  -AF      Comment (Upper Body Dressing)  bed level   -AF      Recorded by [AF] Aileen Knag, OTR 09/02/20 0951      Row Name 09/02/20 0946             Grooming Assessment/Training    [place] Level (Grooming)  grooming skills;oral care regimen;wash face, hands;contact guard assist  -AF      Grooming Position  edge of bed sitting  -AF      Comment (Grooming)  with CGA for balance   -AF      Recorded by [AF] Aileen Kang, OTR 09/02/20 0951      Row Name 09/02/20 0946             Toileting Assessment/Training    Comment (Toileting)  assistance with donning new brief   -AF      Recorded by [AF] Aileen Kang, OTR 09/02/20 0951      Row Name 09/02/20 0946             Static Sitting Balance    Level of [place] (Unsupported Sitting, Static Balance)  standby assist;contact guard assist  -AF      Sitting Position (Unsupported Sitting, Static Balance)  sitting on  edge of bed  -AF      Time Able to Maintain Position (Unsupported Sitting, Static Balance)  more than 5 minutes  -AF      Comment (Unsupported Sitting, Static Balance)  during ADL tasks   -AF      Recorded by [AF] Aileen Kang, OTR 09/02/20 0951      Row Name 09/02/20 0946             Static Standing Balance    Level of [place] (Supported Standing, Static Balance)  minimal assist, 75% patient effort;2 person assist  -AF      Time Able to Maintain Position (Supported Standing, Static Balance)  1 to 2 minutes  -AF      Assistive Device Utilized (Supported Standing, Static Balance)  -- hand held  -AF      Comment (Supported Standing, Static Balance)  flexed posture stated having rib pain increased when standing and activity  -AF      Recorded by [AF] Aileen Kang, OTR 09/02/20 0951      Row Name 09/02/20 0946             Neuromuscular Re-education    Comment, Neuromuscular Re-education  alternating BUE during grooming tasks seated on EOB   -AF      Recorded by [AF] Aileen Kang, OTR 09/02/20 0951      Row Name 09/02/20 0946             Positioning and Restraints    Pre-Treatment Position  in bed  -AF      Post Treatment Position  bed  -AF      In Bed  side lying left;call light within reach;encouraged to call for assist;exit alarm on  -AF      Recorded by [AF] Aileen Kang, OTR 09/02/20 0951      Row Name 09/02/20 0946             Pain Scale: FACES Pre/Post-Treatment    Pain: FACES Scale, Pretreatment  4-->hurts little more  -AF      Pain: FACES Scale, Post-Treatment  4-->hurts little more  -AF      Pre/Post Treatment Pain Comment  Rib pain, lioderm patch applied by NSG  -AF      Recorded by [AF] Aileen Kang, OTR 09/02/20 0951      Row Name                Wound 08/21/20 Right lateral parietal region Incision    Wound - Properties Group Date first assessed: 08/21/20 [DG] Present on Hospital Admission: N [DG] Side: Right [DG] Orientation: lateral [DG] Location: parietal region [DG] Primary Wound Type:  Incision [DG] Recorded by:  [DG] Sandra Ziegler RN 08/21/20 1407    Row Name                Wound 08/29/20 1015 upper sacral spine Pressure Injury    Wound - Properties Group Date first assessed: 08/29/20 [SS] Time first assessed: 1015 [SS] Present on Hospital Admission: Y [SS], Assumed, did not do admission  Orientation: upper [SS] Location: sacral spine [SS] Primary Wound Type: Pressure inj [SS] Stage, Pressure Injury: deep tissue injury [SS], Bruised, unsure if PI  Additional Comments: Fell and has broken sacrum from prior to admission [SS] Recorded by:  [SS] Sophia Ariza RN 08/30/20 1150    Row Name 09/02/20 0946             Outcome Summary/Treatment Plan (OT)    Daily Summary of Progress (OT)  progress toward functional goals is good  -AF      Anticipated Discharge Disposition (OT)  inpatient rehabilitation facility  -AF      Recorded by [AF] Aileen Kang, OTR 09/02/20 0951        User Key  (r) = Recorded By, (t) = Taken By, (c) = Cosigned By    Initials Name Effective Dates Discipline    AF Aileen Kang, OTR 04/14/20 -  OT    SS Sophia Ariza RN 06/16/16 -  Nurse    DG Sandra Ziegler RN 02/14/20 -  Nurse        Wound 08/21/20 Right lateral parietal region Incision (Active)   Dressing Appearance open to air;no drainage 9/2/2020  9:08 AM   Closure Liquid skin adhesive 9/2/2020  9:08 AM   Base dry;clean;pink;scab 9/2/2020  9:08 AM   Periwound intact;dry 9/2/2020 12:19 AM   Periwound Temperature warm 9/2/2020 12:19 AM   Drainage Amount none 9/2/2020  9:08 AM   Dressing Care, Wound open to air 9/2/2020  9:08 AM       Wound 08/29/20 1015 upper sacral spine Pressure Injury (Active)   Dressing Appearance open to air 9/2/2020  7:51 AM   Closure Open to air 9/2/2020  7:51 AM   Base maroon/purple 9/2/2020  7:51 AM   Periwound blanchable;ecchymotic 9/2/2020  7:51 AM   Drainage Amount none 9/2/2020 12:19 AM   Dressing Care, Wound open to air 9/2/2020  7:51 AM   Periwound Care, Wound other (see comments)  9/2/2020  7:51 AM     Rehab Goal Summary     Row Name 09/02/20 0900             Swallow Goals (SLP)    Pharyngeal Strengthening Exercise Goal Selection (SLP)  pharyngeal strengthening exercise, SLP goal 1  -KA         Oral Nutrition/Hydration Goal 1 (SLP)    Oral Nutrition/Hydration Goal 1, SLP  Pt will safely swallow any consistency of liquid and solid without overt s/s of penetration/aspiration to assess for readiness to initiate diet vs need of instrumental evaluation.   -KA      Time Frame (Oral Nutrition/Hydration Goal 1, SLP)  by discharge  -KA      Barriers (Oral Nutrition/Hydration Goal 1, SLP)  SLP fed patient breakfast this morning, patient demonstrated no overt s/s of pen.asp with 4oz of NTL and 4oz puree (ate some cream of wheat and boost pudding). SLP completed VFSS f/u and reviewed results of VFSS and rationale for current diet  -KA         Lingual Strengthening Goal 1 (SLP)    Activity (Lingual Strengthening Goal 1, SLP)  increase tongue back strength;increase lingual tone/sensation/control/coordination/movement  -KA      Increase Lingual Tone/Sensation/Control/Coordination/Movement  lingual movement exercises  -KA      Increase Tongue Back Strength  lingual movement exercises  -KA      [place]/Accuracy (Lingual Strengthening Goal 1, SLP)  with minimal cues (75-90% accuracy)  -KA      Time Frame (Lingual Strengthening Goal 1, SLP)  short term goal (STG);by discharge  -KA      Barriers (Lingual Strengthening Goal 1, SLP)  Patient completed x5 lingual protrusion and lateralization and retraction exercises with mod visual cues   -KA         Pharyngeal Strengthening Exercise Goal 1 (SLP)    Activity (Pharyngeal Strengthening Goal 1, SLP)  increase squeeze/positive pressure generation;increase tongue base retraction  -KA      Increase Squeeze/Positive Pressure Generation  hard effortful swallow  -KA      Increase Tongue Base Retraction  yesi  -KA      [place]/Accuracy (Pharyngeal Strengthening  Goal 1, SLP)  with minimal cues (75-90% accuracy)  -KA      Time Frame (Pharyngeal Strengthening Goal 1, SLP)  short term goal (STG);by discharge  -KA      Barriers (Pharyngeal Strengthening Goal 1, SLP)  Patient completed x5 effortful swallows with mod visual cues and unable to complete yesi   -KA        User Key  (r) = Recorded By, (t) = Taken By, (c) = Cosigned By    Initials Name Provider Type Discipline    Carl Rodas MA,[facility]-SLP Speech and Language Pathologist SLP        Occupational Therapy Education                 Title: PT OT SLP Therapies (In Progress)     Topic: Occupational Therapy (In Progress)     Point: ADL training (Done)     Description:   Instruct learner(s) on proper safety adaptation and remediation techniques during self care or transfers.   Instruct in proper use of assistive devices.              Learning Progress Summary           Patient Acceptance, E, VU by RD at 8/25/2020 1223    Comment:  OT educ on OT role in therapeutic process and pt's POC.                   Point: Home exercise program (Done)     Description:   Instruct learner(s) on appropriate technique for monitoring, assisting and/or progressing therapeutic exercises/activities.              Learning Progress Summary           Patient Acceptance, E, VU,DU by  at 8/31/2020 1531    Comment:  Pt encouraged to continue to complete self ROM of LUE. Pt demo's back understanding.                   Point: Precautions (Not Started)     Description:   Instruct learner(s) on prescribed precautions during self-care and functional transfers.              Learner Progress:   Not documented in this visit.          Point: Body mechanics (Not Started)     Description:   Instruct learner(s) on proper positioning and spine alignment during self-care, functional mobility activities and/or exercises.              Learner Progress:   Not documented in this visit.                      User Key     Initials Effective Dates Name Provider Type  Discipline    RD 10/14/19 -  Roxi Thomas OT Occupational Therapist OT     04/02/20 -  Sophia Joy OT Occupational Therapist OT                OT Recommendation and Plan  Outcome Summary/Treatment Plan (OT)  Daily Summary of Progress (OT): progress toward functional goals is good  Anticipated Discharge Disposition (OT): inpatient rehabilitation facility  Daily Summary of Progress (OT): progress toward functional goals is good  Outcome Measures     Row Name 09/02/20 0952 08/31/20 1500          How much help from another is currently needed...    Putting on and taking off regular lower body clothing?  1  -AF  1  -SM     Bathing (including washing, rinsing, and drying)  2  -AF  2  -SM     Toileting (which includes using toilet bed pan or urinal)  2  -AF  2  -SM     Putting on and taking off regular upper body clothing  3  -AF  2  -SM     Taking care of personal grooming (such as brushing teeth)  3  -AF  2  -SM     Eating meals  3  -AF  3  -SM     AM-PAC 6 Clicks Score (OT)  14  -AF  12  -SM        Functional Assessment    Outcome Measure Options  --  AM-PAC 6 Clicks Daily Activity (OT)  -SM       User Key  (r) = Recorded By, (t) = Taken By, (c) = Cosigned By    Initials Name Provider Type    AF Aileen Kang OTR Occupational Therapist     Sophia Joy, HARSHAD Occupational Therapist           Time Calculation:   Time Calculation- OT     Row Name 09/02/20 0952             Time Calculation- OT    OT Start Time  0914  -AF      OT Stop Time  0938  -AF      OT Time Calculation (min)  24 min  -AF      Total Timed Code Minutes- OT  24 minute(s)  -AF      OT - Next Appointment  09/03/20  -AF        User Key  (r) = Recorded By, (t) = Taken By, (c) = Cosigned By    Initials Name Provider Type    Aileen Montano, OTR Occupational Therapist        Therapy Charges for Today     Code Description Service Date Service Provider Modifiers Qty    [id number]  OT SELF CARE/MGMT/TRAIN EA 15 MIN 9/2/2020 Jorge Luis  Aileen SHERMAN, OTR GO 2               Aileen Kang, OTR  9/2/2020

## 2020-09-02 NOTE — PROGRESS NOTES
"Adult Nutrition  Assessment/PES    Patient Name:  Haris Sweeney  YOB: 1929  MRN: [id number]  Admit Date:  8/20/2020    Assessment Date:  9/2/2020    Comments:  Nutrition follow up.  S/p VFSS yesterday.  Being evaluated by [facility] for possible placement.  Eating 25-50% at meals.  RN reports not a big eater.  She is unsure if he has been getting Magic Cups - this RD will check this with diet office.  She also reports daughter mentioned protein shakes - will add Mighty Shakes TID.    RD to continue to follow.    RD working remotely due to covid-19 pandemic. Assessment completed based on review of electronic medical record. RD may be reached via secure chat or email.     Reason for Assessment     Row Name 09/02/20 1352          Reason for Assessment    Reason For Assessment  follow-up protocol         Nutrition/Diet History     Row Name 09/02/20 1352          Nutrition/Diet History    Typical Food/Fluid Intake  RN reports he is not a big eater and she is unsure if he has been getting Magic Cups, says dtr mentioned protein shakes - will add Mighty Shakes         Anthropometrics     Row Name 09/02/20 1353          Anthropometrics    Height  162.6 cm (64.02\")        Admit Weight    Admit Weight  -- 134# 9/2        Ideal Body Weight (IBW)    Ideal Body Weight (IBW) (kg)  59.76        Body Mass Index (BMI)    BMI Assessment  BMI 18.5-24.9: normal 23.03         Labs/Tests/Procedures/Meds     Row Name 09/02/20 1353          Labs/Procedures/Meds    Lab Results Reviewed  reviewed, pertinent     Lab Results Comments  Creat, WBC, Hgb, Hct        Diagnostic Tests/Procedures    Diagnostic Test/Procedure Reviewed  reviewed, pertinent     Diagnostic Test/Procedures Comments  s/p VFSS yesterday        Medications    Pertinent Medications Reviewed  reviewed, pertinent     Pertinent Medications Comments  lasix, glucotrol, humulin R, keppra, synthroid, IVFs         Physical Findings     Row Name 09/02/20 1359 " "         Physical Findings    Skin  surgical incision;other (see comments);pressure injury B=14, bruised, R parietal region inc, sacral spine DTI         Estimated/Assessed Needs     Row Name 09/02/20 1355          Calculation Measurements    Height  162.6 cm (64.02\")         Nutrition Prescription Ordered     Row Name 09/02/20 1358          Nutrition Prescription PO    Current PO Diet  Pureed     Fluid Consistency  Nectar/syrup thick     Supplement  Magic Cup     Supplement Frequency  3 times a day     Common Modifiers  Cardiac;Consistent Carbohydrate     Other Modifiers  No Straws         Evaluation of Received Nutrient/Fluid Intake     Row Name 09/02/20 1356          PO Evaluation    Number of Meals  2     % PO Intake  25-50         Problem/Interventions:    Intervention Goal     Row Name 09/02/20 1351          Intervention Goal    General  Maintain nutrition;Reduce/improve symptoms;Disease management/therapy;Meet nutritional needs for age/condition     PO  Increase intake;PO intake (%)     PO Intake %  75 %     Weight  Maintain weight         Nutrition Intervention     Row Name 09/02/20 1350          Nutrition Intervention    RD/Tech Action  Follow Tx progress;Care plan reviewd;Recommend/ordered     Recommended/Ordered  Supplement         Nutrition Prescription     Row Name 09/02/20 135          Nutrition Prescription PO    PO Prescription  Begin/change supplement     Supplement  Mighty Shake     Supplement Frequency  3 times a day     New PO Prescription Ordered?  Yes         Education/Evaluation     Row Name 09/02/20 7207          Education    Education  Will Instruct as appropriate        Monitor/Evaluation    Monitor  Per protocol;PO intake;Supplement intake;Pertinent labs;Weight;Skin status;Symptoms     Education Follow-up  Reinforce PRN           Electronically signed by:  Dilia Parker RD  09/02/20 13:57  "

## 2020-09-02 NOTE — PROGRESS NOTES
[facility]  Ted Fletcher MD     LOS: 13 days   Patient Care Team:  Jesus Saez MD as PCP - General (Internal Medicine)  Casper Torres MD as PCP - Claims Attributed        Subjective     Interval History:     Patient Complaints: Gross hematuria, urinary retention  History taken from: patient chart Patient sleeping but arousable.  Relates he has some pain from the catheter.    Review of Systems:   All systems were reviewed and were negative except for pain from his catheter.  Generalized weakness.  No gross hematuria.    Objective     Vital Signs  Temp:  [97.5 °F (36.4 °C)-98.3 °F (36.8 °C)] 97.8 °F (36.6 °C)  Heart Rate:  [82-97] 89  Resp:  [16] 16  BP: (107-127)/(67-94) 126/82    Physical Exam:     General Appearance:    Alert, cooperative, in no acute distress   Head:    Normocephalic, without obvious abnormality, atraumatic   Eyes:            Lids and lashes normal, conjunctivae and sclerae normal, no   icterus, no pallor, corneas clear, PERRLA   Ears:    Ears appear intact with no abnormalities noted   Throat:   No oral lesions, no thrush, oral mucosa moist   Neck:   No adenopathy, supple, trachea midline,    Back:     No kyphosis present, no scoliosis present, no skin lesions,      erythema or scars, no tenderness to percussion or                   palpation,   range of motion normal   Lungs:     Clear to auscultation,respirations regular, even and                  unlabored    Heart:    Regular rhythm and normal rate, normal S1 and S2, no            murmur, no gallop, no rub, no click   Chest Wall:    No abnormalities observed   Abdomen:     Normal bowel sounds, no masses, no organomegaly, soft        non-tender, non-distended, no guarding, no rebound                tenderness   Genital/Rectal:    Catheter in place.  Urine output clear.   Extremities:   Moves all extremities well, no edema, no cyanosis, no             redness       Skin:   No bleeding, bruising or rash        Neurologic:   Cranial nerves 2 - 12 grossly intact, sensation intact,        Results Review:     I reviewed the patient's new clinical results.    Recent Results (from the past 24 hour(s))   POC Glucose Once    Collection Time: 09/01/20 11:22 AM   Result Value Ref Range    Glucose 160 (H) 70 - 130 mg/dL   POC Glucose Once    Collection Time: 09/01/20  4:19 PM   Result Value Ref Range    Glucose 207 (H) 70 - 130 mg/dL   POC Glucose Once    Collection Time: 09/01/20  9:37 PM   Result Value Ref Range    Glucose 96 70 - 130 mg/dL   Basic Metabolic Panel    Collection Time: 09/02/20  4:57 AM   Result Value Ref Range    Glucose 88 65 - 99 mg/dL    BUN 15 8 - 23 mg/dL    Creatinine 0.54 (L) 0.76 - 1.27 mg/dL    Sodium 139 136 - 145 mmol/L    Potassium 3.6 3.5 - 5.2 mmol/L    Chloride 104 98 - 107 mmol/L    CO2 27.5 22.0 - 29.0 mmol/L    Calcium 8.5 8.2 - 9.6 mg/dL    eGFR Non African Amer 143 >60 mL/min/1.73    BUN/Creatinine Ratio 27.8 (H) 7.0 - 25.0    Anion Gap 7.5 5.0 - 15.0 mmol/L   CBC Auto Differential    Collection Time: 09/02/20  4:57 AM   Result Value Ref Range    WBC 13.38 (H) 3.40 - 10.80 10*3/mm3    RBC 3.24 (L) 4.14 - 5.80 10*6/mm3    Hemoglobin 10.8 (L) 13.0 - 17.7 g/dL    Hematocrit 32.7 (L) 37.5 - 51.0 %    .9 (H) 79.0 - 97.0 fL    MCH 33.3 (H) 26.6 - 33.0 pg    MCHC 33.0 31.5 - 35.7 g/dL    RDW 14.0 12.3 - 15.4 %    RDW-SD 51.7 37.0 - 54.0 fl    MPV 10.6 6.0 - 12.0 fL    Platelets 213 140 - 450 10*3/mm3    Neutrophil % 74.3 42.7 - 76.0 %    Lymphocyte % 10.5 (L) 19.6 - 45.3 %    Monocyte % 10.2 5.0 - 12.0 %    Eosinophil % 3.1 0.3 - 6.2 %    Basophil % 0.6 0.0 - 1.5 %    Immature Grans % 1.3 (H) 0.0 - 0.5 %    Neutrophils, Absolute 9.94 (H) 1.70 - 7.00 10*3/mm3    Lymphocytes, Absolute 1.41 0.70 - 3.10 10*3/mm3    Monocytes, Absolute 1.37 (H) 0.10 - 0.90 10*3/mm3    Eosinophils, Absolute 0.41 (H) 0.00 - 0.40 10*3/mm3    Basophils, Absolute 0.08 0.00 - 0.20 10*3/mm3    Immature Grans, Absolute  0.17 (H) 0.00 - 0.05 10*3/mm3    nRBC 0.1 0.0 - 0.2 /100 WBC   POC Glucose Once    Collection Time: 09/02/20  6:14 AM   Result Value Ref Range    Glucose 100 70 - 130 mg/dL       Medication Review:   Current Facility-Administered Medications:   •  acetaminophen (TYLENOL) tablet 650 mg, 650 mg, Oral, Q4H PRN, 650 mg at 09/01/20 0247 **OR** acetaminophen (TYLENOL) suppository 650 mg, 650 mg, Rectal, Q4H PRN, Susi Silva MD, 650 mg at 08/31/20 0432  •  fentaNYL (DURAGESIC) 25 MCG/HR patch 1 patch, 1 patch, Transdermal, Q72H, 1 patch at 09/01/20 2100 **AND** Check Fentanyl Patch Placement, 1 each, Does not apply, Q12H, Jacoby Awad MD  •  citalopram (CeleXA) tablet 10 mg, 10 mg, Oral, Nightly, Rogelio Lira MD, 10 mg at 09/01/20 2100  •  dextrose (D50W) 25 g/ 50mL Intravenous Solution 25 g, 25 g, Intravenous, Q15 Min PRN, uSsi Silva MD, 25 g at 08/31/20 0629  •  dextrose (GLUTOSE) oral gel 15 g, 15 g, Oral, Q15 Min PRN, Susi Silva MD  •  finasteride (PROSCAR) tablet 5 mg, 5 mg, Oral, Nightly, Susi Silva MD, 5 mg at 09/01/20 2100  •  furosemide (LASIX) tablet 20 mg, 20 mg, Oral, BID, Susi Silva MD, 20 mg at 09/01/20 1505  •  glipizide (GLUCOTROL) tablet 2.5 mg, 2.5 mg, Oral, QAM AC, Susi Silva MD, 2.5 mg at 09/02/20 0635  •  glucagon (human recombinant) (GLUCAGEN DIAGNOSTIC) injection 1 mg, 1 mg, Subcutaneous, Q15 Min PRN, Susi Silva MD  •  insulin regular (humuLIN R,novoLIN R) injection 0-9 Units, 0-9 Units, Subcutaneous, 4x Daily AC & at Bedtime, Ajay Harry MD, 4 Units at 09/01/20 1721  •  lactated ringers infusion, 75 mL/hr, Intravenous, Continuous, Ajay Harry MD, Last Rate: 75 mL/hr at 08/31/20 2310, 75 mL/hr at 08/31/20 2310  •  latanoprost (XALATAN) 0.005 % ophthalmic solution 1 drop, 1 drop, Both Eyes, Daily, Susi Silva MD, 1 drop at 09/01/20 2100  •  levETIRAcetam (KEPPRA) tablet 500 mg, 500 mg, Oral, Q12H, Ajay Harry MD, 500 mg at  09/01/20 2100  •  levothyroxine (SYNTHROID, LEVOTHROID) tablet 125 mcg, 125 mcg, Oral, Q AM, Susi Silva MD, 125 mcg at 09/02/20 0635  •  lidocaine (LIDODERM) 5 % 3 patch, 3 patch, Transdermal, Q24H, Ajay Harry MD, 3 patch at 09/01/20 0825  •  lidocaine (UROJET) jelly, , Urethral, PRN, Ted Fletcher MD  •  lisinopril (PRINIVIL,ZESTRIL) tablet 5 mg, 5 mg, Oral, Q24H, Susi Silva MD, 5 mg at 09/01/20 1505  •  Magnesium Sulfate 2 gram Bolus, followed by 8 gram infusion (total Mg dose 10 grams)- Mg less than or equal to 1mg/dL, 2 g, Intravenous, PRN **OR** Magnesium Sulfate 2 gram / 50mL Infusion (GIVE X 3 BAGS TO EQUAL 6GM TOTAL DOSE) - Mg 1.1 - 1.5 mg/dl, 2 g, Intravenous, PRN **OR** Magnesium Sulfate 4 gram infusion- Mg 1.6-1.9 mg/dL, 4 g, Intravenous, PRN, Susi Silva MD  •  [DISCONTINUED] HYDROmorphone (DILAUDID) injection 0.5 mg, 0.5 mg, Intravenous, Q2H PRN, 0.5 mg at 08/27/20 0127 **AND** naloxone (NARCAN) injection 0.4 mg, 0.4 mg, Intravenous, Q5 Min PRN, Susi Silva MD  •  ondansetron (ZOFRAN) tablet 4 mg, 4 mg, Oral, Q6H PRN **OR** ondansetron (ZOFRAN) injection 4 mg, 4 mg, Intravenous, Q6H PRN, Susi Silva MD  •  PHENobarbital injection 30 mg, 30 mg, Intravenous, Nightly, Dm Larios MD, 30 mg at 09/01/20 2100  •  potassium chloride (MICRO-K) CR capsule 40 mEq, 40 mEq, Oral, PRN, 40 mEq at 09/02/20 0635 **OR** potassium chloride (KLOR-CON) packet 40 mEq, 40 mEq, Oral, PRN, 40 mEq at 08/26/20 1357 **OR** potassium chloride 10 mEq in 100 mL IVPB, 10 mEq, Intravenous, Q1H PRN, Susi Silva MD  •  [COMPLETED] Insert peripheral IV, , , Once **AND** sodium chloride 0.9 % flush 10 mL, 10 mL, Intravenous, PRN, Susi Silva MD  •  sodium chloride 0.9 % flush 10 mL, 10 mL, Intravenous, Q12H, Susi Silva MD, 10 mL at 09/01/20 2100  •  sodium chloride 0.9 % flush 10 mL, 10 mL, Intravenous, PRN, Susi Silva MD  •  tamsulosin (FLOMAX) 24 hr capsule 0.4 mg, 0.4 mg,  Oral, Daily, Ajay Harry MD, 0.4 mg at 09/01/20 0825  •  timolol (TIMOPTIC) 0.5 % ophthalmic solution 1 drop, 1 drop, Both Eyes, Daily, Susi Silva MD, 1 drop at 09/01/20 0826    Assessment/Plan       SDH (subdural hematoma) (CMS/HCC)    Head injury due to trauma    Traumatic subdural hematoma without loss of consciousness (CMS/HCC)    Coagulopathy (CMS/HCC)      Gross hematuria  Urinary retention  History of bladder cancer    Plan for disposition:At this point I plan to leave his catheter in for another day.  If his urine remains clear he probably can switch it to a smaller catheter that continues to cause pain.    Ted Fletcher MD  09/02/20  07:07      Time: 25 minutes

## 2020-09-02 NOTE — CONSULTS
Purpose of the visit was to evaluate for: goals of care/advanced care planning, support for patient/family, hospice referral/discussion and pain/symptom management. Spoke with MD and RN as well as patient and family and discussed palliative care, goals of care, resuscitation status and [facility].      Assessment:  Patient is palliative care appropriate given SDH, multiple fractures, seizure d/o, CHF, atrial fibrillation, bladder carcinoma, and dementia. PPS: 30%. Psychosocial Acuity: 1-normal complexity. Spiritual Acuity: 1-normal complexity.     Recommendations/Plan: Change code status- No CPR, No Intubation, No Cardioversion/Defibrillation, No artificial nutrition or feeding tubes. Palliative Care will follow while family continues to make decisions regarding GOC.     Other Comments:   Palliative Care spoke to patient, patient's wife, and dgtr at bedside. Patient and family voice understanding of patient's current medical condition. Patient expressed his biggest issue is pain. Family considering skilled subacute rehab then home with hospice but uncertain at this time. Patient and family confirmed DNR status and not wanting life prolonging measures.     Provided education about npatient palliative care unit, hospice care at home vs facility vs inpatient and symptom management for pain. Answered family's questions and provided support. Family wants to further discuss and agreeable to palliative care follow up tomorrow.

## 2020-09-02 NOTE — SIGNIFICANT NOTE
09/02/20 1631   Rehab Treatment   Discipline physical therapist   Reason Treatment Not Performed patient/family declined treatment  (patient and family both decline PT this afternoon, will check back tomorrow. notified IRENE Butts)   Recommendation   PT - Next Appointment 09/03/20

## 2020-09-03 NOTE — PROGRESS NOTES
[facility] [place] NEUROSURGERY PROGRESS NOTE    PATIENT IDENTIFICATION:   Name:  Haris Sweeney      MRN:  [id number]     89 y.o.  male               CC:POD 13 right frontal craniotomy for evacuation of SDH.       Subjective     Interval History:  Denies headache but does report abdominal pain. Fentanyl patch was initiated 9/2. Palliative care consulted per family but no decisions made yet. PT did not see patient today per family request.     ROS:  Constitutional: No fever  HEENT: No headache, no vision changes  Neuro: Lethargic, no speech difficulties    Objective     Vital signs in last 24 hours:  Temp:  [98.2 °F (36.8 °C)-98.4 °F (36.9 °C)] 98.4 °F (36.9 °C)  Heart Rate:  [76-84] 76  Resp:  [14-20] 20  BP: ()/(58-71) 91/63      Intake/Output this shift:  I/O this shift:  In: 150 [P.O.:150]  Out: -       Intake/Output last 3 shifts:  I/O last 3 completed shifts:  In: 2595 [P.O.:170; I.V.:1925; IV Piggyback:500]  Out: 2200 [Urine:2200]    LABS:  Results from last 7 days   Lab Units 09/03/20  0707 09/02/20  0457 09/01/20  0536   WBC 10*3/mm3 12.96* 13.38* 9.65   HEMOGLOBIN g/dL 8.6* 10.8* 10.2*   HEMATOCRIT % 26.2* 32.7* 31.0*   PLATELETS 10*3/mm3 208 213 235     Results from last 7 days   Lab Units 09/03/20  0501 09/02/20  0457 09/01/20  0536   SODIUM mmol/L 138 139 144   POTASSIUM mmol/L 4.1 3.6 4.2   CHLORIDE mmol/L 105 104 108*   CO2 mmol/L 25.1 27.5 28.8   BUN mg/dL 13 15 18   CREATININE mg/dL 0.58* 0.54* 0.60*   CALCIUM mg/dL 8.6 8.5 8.9   GLUCOSE mg/dL 80 88 141*       IMAGING STUDIES:  CTH: As compared to prior study on 8/27 reveals postoperative changes with resolved pneumocephalus.  The right-sided subdural hematoma appears to be evolving.  No new areas of hemorrhage observed.  The right-sided subdural shows less density on the current study as compared to the previous.    I personally viewed and interpreted the patient's CT head.    Meds reviewed/changed: Yes    Current Facility-Administered Medications:    •  acetaminophen (TYLENOL) tablet 650 mg, 650 mg, Oral, Q4H PRN, 650 mg at 09/03/20 0921 **OR** acetaminophen (TYLENOL) suppository 650 mg, 650 mg, Rectal, Q4H PRN, Susi Silva MD, 650 mg at 08/31/20 0432  •  bisacodyl (DULCOLAX) suppository 10 mg, 10 mg, Rectal, Daily PRN, Ajay Harry MD, 10 mg at 09/02/20 1417  •  fentaNYL (DURAGESIC) 50 MCG/HR patch 1 patch, 1 patch, Transdermal, Q72H, 1 patch at 09/02/20 1811 **AND** Check Fentanyl Patch Placement, 1 each, Does not apply, Q12H, Jacoby Awad MD  •  citalopram (CeleXA) tablet 10 mg, 10 mg, Oral, Nightly, Rogelio Lira MD, 10 mg at 09/02/20 2030  •  dextrose (D50W) 25 g/ 50mL Intravenous Solution 25 g, 25 g, Intravenous, Q15 Min PRN, Susi Silva MD, 25 g at 08/31/20 0629  •  dextrose (GLUTOSE) oral gel 15 g, 15 g, Oral, Q15 Min PRN, Susi Silva MD  •  finasteride (PROSCAR) tablet 5 mg, 5 mg, Oral, Nightly, Susi Silva MD, 5 mg at 09/02/20 2030  •  glipizide (GLUCOTROL) tablet 2.5 mg, 2.5 mg, Oral, QAM AC, Susi Silva MD, 2.5 mg at 09/03/20 0618  •  glucagon (human recombinant) (GLUCAGEN DIAGNOSTIC) injection 1 mg, 1 mg, Subcutaneous, Q15 Min PRN, Susi Silva MD  •  HYDROmorphone (DILAUDID) injection 0.5 mg, 0.5 mg, Intravenous, Q2H PRN, Ajay Harry MD  •  insulin regular (humuLIN R,novoLIN R) injection 0-9 Units, 0-9 Units, Subcutaneous, 4x Daily AC & at Bedtime, Ajay Harry MD, 4 Units at 09/01/20 1721  •  lactated ringers infusion, 100 mL/hr, Intravenous, Continuous, Ajay Harry MD, Last Rate: 100 mL/hr at 09/03/20 1637, 100 mL/hr at 09/03/20 1637  •  latanoprost (XALATAN) 0.005 % ophthalmic solution 1 drop, 1 drop, Both Eyes, Daily, Susi Silva MD, 1 drop at 09/02/20 2030  •  levETIRAcetam (KEPPRA) 100 MG/ML solution 750 mg, 750 mg, Oral, Q12H, Ajay Harry MD  •  levETIRAcetam (KEPPRA) tablet 750 mg, 750 mg, Oral, Q12H, Jacoby Awad MD, 750 mg at 09/03/20 0921  •  levothyroxine  (SYNTHROID, LEVOTHROID) tablet 125 mcg, 125 mcg, Oral, Q AM, Susi Silva MD, 125 mcg at 09/03/20 0618  •  lidocaine (LIDODERM) 5 % 3 patch, 3 patch, Transdermal, Q24H, Ajay Harry MD, 3 patch at 09/03/20 0921  •  lidocaine (UROJET) jelly, , Urethral, PRN, Ted Fletcher MD  •  Magnesium Sulfate 2 gram Bolus, followed by 8 gram infusion (total Mg dose 10 grams)- Mg less than or equal to 1mg/dL, 2 g, Intravenous, PRN **OR** Magnesium Sulfate 2 gram / 50mL Infusion (GIVE X 3 BAGS TO EQUAL 6GM TOTAL DOSE) - Mg 1.1 - 1.5 mg/dl, 2 g, Intravenous, PRN **OR** Magnesium Sulfate 4 gram infusion- Mg 1.6-1.9 mg/dL, 4 g, Intravenous, PRN, Susi Silva MD  •  methylnaltrexone (RELISTOR) injection 4 mg, 4 mg, Subcutaneous, Q48H PRN, Ajay Harry MD  •  [DISCONTINUED] HYDROmorphone (DILAUDID) injection 0.5 mg, 0.5 mg, Intravenous, Q2H PRN, 0.5 mg at 08/27/20 0127 **AND** naloxone (NARCAN) injection 0.4 mg, 0.4 mg, Intravenous, Q5 Min PRN, Susi Silva MD  •  ondansetron (ZOFRAN) tablet 4 mg, 4 mg, Oral, Q6H PRN **OR** ondansetron (ZOFRAN) injection 4 mg, 4 mg, Intravenous, Q6H PRN, Susi Silva MD  •  potassium chloride (MICRO-K) CR capsule 40 mEq, 40 mEq, Oral, PRN, 40 mEq at 09/02/20 0635 **OR** potassium chloride (KLOR-CON) packet 40 mEq, 40 mEq, Oral, PRN, 40 mEq at 08/26/20 1357 **OR** potassium chloride 10 mEq in 100 mL IVPB, 10 mEq, Intravenous, Q1H PRN, Susi Silva MD  •  [COMPLETED] Insert peripheral IV, , , Once **AND** sodium chloride 0.9 % flush 10 mL, 10 mL, Intravenous, PRN, Susi Silva MD  •  sodium chloride 0.9 % flush 10 mL, 10 mL, Intravenous, Q12H, Susi Silva MD, 10 mL at 09/02/20 2030  •  sodium chloride 0.9 % flush 10 mL, 10 mL, Intravenous, PRN, Susi Silva MD  •  tamsulosin (FLOMAX) 24 hr capsule 0.4 mg, 0.4 mg, Oral, Daily, Ajay Harry MD, 0.4 mg at 09/02/20 0934  •  timolol (TIMOPTIC) 0.5 % ophthalmic solution 1 drop, 1 drop, Both Eyes, Daily,  Susi Silva MD, 1 drop at 09/03/20 0921    Physical Exam:    General:   Eyes open to name.  Oriented x3.   HEENT:    Right frontal craniotomy incision. Well approximated. No redness or swelling. Dry mouth    CN III IV VI: Extraocular movements are full , Pupils 3mm and brisk  .  CN VII: Facial movements are symmetric. No weakness.  Motor: No drift. Moves all extremities spontaneously and to command. Left slightly weaker than right  Coordination: Finger to nose intact bilaterally       Assessment/Plan     ASSESSMENT:      SDH (subdural hematoma) (CMS/HCC)    Head injury due to trauma    Traumatic subdural hematoma without loss of consciousness (CMS/HCC)    Coagulopathy (CMS/[facility])      PLAN: Patient remains stable. Is more sleepy today. Fentanyl patch started yesterday for pain management. Palliative care consulted per family request. Family discussing option of comfort care versus rehabilitation.  Neurosurgery will sign off and arrange CTH and follow-up in two weeks.  He is call the meantime with questions or concerns.  Seizure management per neurology service.    I discussed the patient's findings and my recommendations with patient and Dr. Delgado.     EVELINE Rodriguez     LOS: 14 days       EVELINE Ron  9/3/2020  17:45

## 2020-09-03 NOTE — PROGRESS NOTES
Continued Stay Note  [facility]     Patient Name: Haris Sweeney  MRN: [id number]  Today's Date: 9/3/2020    Admit Date: 8/20/2020    Discharge Plan     Row Name 09/03/20 7212       Plan    Plan  Possible palliative care vs SNF.     Plan Comments  Family is discussing palliaitive care.  CCP spoke with Natividad Thurston, no beds are anticipated until at least mid next week.  CCP spoke with Kimmie/ [facility] who states she will continue to follow for now.         Discharge Codes    No documentation.             Amy Cruz RN

## 2020-09-03 NOTE — TELEPHONE ENCOUNTER
----- Message from EVELINE Rodriguez sent at 9/3/2020  4:11 PM EDT -----  Regarding: Follw UP  S/P crani for SDH and needs followup 2 weeks and CTH.     Family may decide on Palliative Care.     Thank you!

## 2020-09-03 NOTE — SIGNIFICANT NOTE
09/03/20 1444   Rehab Treatment   Discipline physical therapist   Reason Treatment Not Performed unable to treat, medical status change  (Palliative has been consulted; no decision has been made. RN asked to check on pt. Pt. was resting comfortably; family declined PT at this time. Will check back tomorrow pending decision with palliative.)   Recommendation   PT - Next Appointment 09/04/20

## 2020-09-03 NOTE — NURSING NOTE
Noted that therapy has been held pending family/patient decision regarding palliative care.  Will follow up with CCP tomorrow

## 2020-09-03 NOTE — PROGRESS NOTES
"  [facility] DAILY PROGRESS NOTE    Patient Identification  Name: Haris Sweeney  Age: 89 y.o.  Sex: male  :  1929  MRN: [id number]    Date: 9/3/2020             Subjective:  Interval History:  Doing well   Resting  No pain at penis  Parks draining well     Objective:    Scheduled Meds:  fentaNYL 1 patch Transdermal Q72H   And      Check Fentanyl Patch Placement 1 each Does not apply Q12H   citalopram 10 mg Oral Nightly   finasteride 5 mg Oral Nightly   glipizide 2.5 mg Oral QAM AC   insulin regular 0-9 Units Subcutaneous 4x Daily AC & at Bedtime   latanoprost 1 drop Both Eyes Daily   levETIRAcetam 750 mg Oral Q12H   levETIRAcetam 750 mg Oral Q12H   levothyroxine 125 mcg Oral Q AM   lidocaine 3 patch Transdermal Q24H   sodium chloride 10 mL Intravenous Q12H   tamsulosin 0.4 mg Oral Daily   timolol 1 drop Both Eyes Daily     Continuous Infusions:  lactated ringers 100 mL/hr Last Rate: 100 mL/hr (20 0617)     PRN Meds:•  acetaminophen **OR** acetaminophen  •  bisacodyl  •  dextrose  •  dextrose  •  glucagon (human recombinant)  •  HYDROmorphone  •  lidocaine  •  magnesium sulfate **OR** magnesium sulfate **OR** magnesium sulfate  •  methylnaltrexone  •  [DISCONTINUED] HYDROmorphone **AND** naloxone  •  ondansetron **OR** ondansetron  •  potassium chloride **OR** potassium chloride **OR** potassium chloride  •  [COMPLETED] Insert peripheral IV **AND** sodium chloride  •  sodium chloride    Vital signs in last 24 hours:  Temp:  [97.6 °F (36.4 °C)-98.4 °F (36.9 °C)] 98.4 °F (36.9 °C)  Heart Rate:  [76-86] 76  Resp:  [14-20] 20  BP: ()/(52-71) 91/63    Intake/Output:    Intake/Output Summary (Last 24 hours) at 9/3/2020 1253  Last data filed at 9/3/2020 1115  Gross per 24 hour   Intake 2525 ml   Output 1000 ml   Net 1525 ml       Exam:  BP 91/63 (BP Location: Left arm, Patient Position: Lying)   Pulse 76   Temp 98.4 °F (36.9 °C) (Oral)   Resp 20   Ht 162.6 cm (64.02\")   Wt 63.4 kg (139 lb 12.8 " oz)   SpO2 96%   BMI 23.98 kg/m²     General Appearance:    Alert, cooperative, no distress, appears stated age   Back:     Symmetric, no CVA tenderness   Lungs:     Clear to auscultation bilaterally, respirations unlabored   Heart:    Regular rate and rhythm, strong peripheral pulses   Abdomen:    Soft   Genitalia:   Parks draining clear, slightly berto urine   Extremities:   Extremities normal, atraumatic, no cyanosis or edema   Skin:   Skin color, texture, turgor normal, no rashes or lesions        Data Review:  All labs (24hrs):   Recent Results (from the past 24 hour(s))   POC Glucose Once    Collection Time: 09/02/20  4:36 PM   Result Value Ref Range    Glucose 108 70 - 130 mg/dL   POC Glucose Once    Collection Time: 09/02/20  8:34 PM   Result Value Ref Range    Glucose 97 70 - 130 mg/dL   Basic Metabolic Panel    Collection Time: 09/03/20  5:01 AM   Result Value Ref Range    Glucose 80 65 - 99 mg/dL    BUN 13 8 - 23 mg/dL    Creatinine 0.58 (L) 0.76 - 1.27 mg/dL    Sodium 138 136 - 145 mmol/L    Potassium 4.1 3.5 - 5.2 mmol/L    Chloride 105 98 - 107 mmol/L    CO2 25.1 22.0 - 29.0 mmol/L    Calcium 8.6 8.2 - 9.6 mg/dL    eGFR Non African Amer 131 >60 mL/min/1.73    BUN/Creatinine Ratio 22.4 7.0 - 25.0    Anion Gap 7.9 5.0 - 15.0 mmol/L   POC Glucose Once    Collection Time: 09/03/20  6:14 AM   Result Value Ref Range    Glucose 102 70 - 130 mg/dL   CBC Auto Differential    Collection Time: 09/03/20  7:07 AM   Result Value Ref Range    WBC 12.96 (H) 3.40 - 10.80 10*3/mm3    RBC 2.58 (L) 4.14 - 5.80 10*6/mm3    Hemoglobin 8.6 (L) 13.0 - 17.7 g/dL    Hematocrit 26.2 (L) 37.5 - 51.0 %    .6 (H) 79.0 - 97.0 fL    MCH 33.3 (H) 26.6 - 33.0 pg    MCHC 32.8 31.5 - 35.7 g/dL    RDW 13.9 12.3 - 15.4 %    RDW-SD 51.2 37.0 - 54.0 fl    MPV 10.0 6.0 - 12.0 fL    Platelets 208 140 - 450 10*3/mm3    Neutrophil % 78.2 (H) 42.7 - 76.0 %    Lymphocyte % 8.1 (L) 19.6 - 45.3 %    Monocyte % 9.0 5.0 - 12.0 %    Eosinophil  % 3.1 0.3 - 6.2 %    Basophil % 0.4 0.0 - 1.5 %    Immature Grans % 1.2 (H) 0.0 - 0.5 %    Neutrophils, Absolute 10.15 (H) 1.70 - 7.00 10*3/mm3    Lymphocytes, Absolute 1.05 0.70 - 3.10 10*3/mm3    Monocytes, Absolute 1.16 (H) 0.10 - 0.90 10*3/mm3    Eosinophils, Absolute 0.40 0.00 - 0.40 10*3/mm3    Basophils, Absolute 0.05 0.00 - 0.20 10*3/mm3    Immature Grans, Absolute 0.15 (H) 0.00 - 0.05 10*3/mm3    nRBC 0.2 0.0 - 0.2 /100 WBC   POC Glucose Once    Collection Time: 09/03/20 11:15 AM   Result Value Ref Range    Glucose 76 70 - 130 mg/dL        Assessment:    SDH (subdural hematoma) (CMS/HCC)    Head injury due to trauma    Traumatic subdural hematoma without loss of consciousness (CMS/HCC)    Coagulopathy (CMS/HCC)      Urinary retention  History of BPH  History of bladder cancer  Gross hematuria       Plan:    Maintain Parks catheter  Okay to change to smaller 18 Fr catheter Friday if continuing to drain clear urine  Follow-up with Dr. Roger Fletcher ([facility]) 1-2 weeks after Discharge - Schedulers messaged.      Timur Fletcher MD  9/3/2020  12:53

## 2020-09-03 NOTE — PROGRESS NOTES
LOS: 14 days   Patient Care Team:  Jesus Saez MD as PCP - General (Internal Medicine)  Casper Torres MD as PCP - Claims Attributed    Subjective     Patient was the most conversant I have seen he said at least a couple of sentences to me he is complaining of pain bilateral lung the lower rib cage is wrapping around from his back.  He does report he had a bowel movement.  He tells us that he wants out of pain.  No family is present today  Review of Systems:         Objective     Vital Signs  Vital Sign Min/Max for last 24 hours  Temp  Min: 97.6 °F (36.4 °C)  Max: 98.4 °F (36.9 °C)   BP  Min: 81/52  Max: 110/63   Pulse  Min: 78  Max: 86   Resp  Min: 14  Max: 20   SpO2  Min: 96 %  Max: 98 %   No data recorded   Weight  Min: 63.4 kg (139 lb 12.8 oz)  Max: 63.4 kg (139 lb 12.8 oz)        Ventilator/Non-Invasive Ventilation Settings (From admission, onward)    None              Arterial Line BP: 113/55  Arterial Line MAP (mmHg): 74 mmHg     Body mass index is 23.98 kg/m².  I/O last 3 completed shifts:  In: 2595 [P.O.:170; I.V.:1925; IV Piggyback:500]  Out: 2200 [Urine:2200]  No intake/output data recorded.        Physical Exam:  General Appearance: Elderly white male lying in bed.  He does not look like he feels well  Eyes: Conjunctiva are clear and anicteric pupils look to be equal about 3 mm  ENT: Mucous membranes are extremely dry post craniotomy changes the incision looks to be healing well no erythema no drainage  Neck: Trachea midline no visible jugular venous distention  Lungs: Clear does not take the deepest breaths no wheezes no rales no rhonchi he is not labored  Cardiac: Regular rate rhythm heart rates in the 80s  Abdomen: Soft no palpable hepatosplenomegaly or masses no tenderness  : bladder distended  Musculoskeletal: Moderate thoracic kyphosis, he has very little muscle mass.  He does have some spinal tenderness in his back along the lower thoracic vertebrae, cannot really spot  tenderness at multiple areas  Skin: No jaundice no petechiae skin is warm and dry  Neuro: He is  more awake today he is very weak  Extremities/P Vascular: No clubbing no cyanosis no edema he has palpable radial and dorsalis pedis pulses  MSE: Very flat affect       Labs:  Results from last 7 days   Lab Units 09/03/20  0501 09/02/20  0457 09/01/20  0536 08/31/20  0606 08/30/20  0718 08/29/20  0344 08/28/20  0508   GLUCOSE mg/dL 80 88 141* 74 111* 153* 102*   SODIUM mmol/L 138 139 144 145 143 139 142   POTASSIUM mmol/L 4.1 3.6 4.2 3.4* 3.6 3.8 3.9   CO2 mmol/L 25.1 27.5 28.8 27.9 28.6 29.5* 32.7*   CHLORIDE mmol/L 105 104 108* 107 104 101 102   ANION GAP mmol/L 7.9 7.5 7.2 10.1 10.4 8.5 7.3   CREATININE mg/dL 0.58* 0.54* 0.60* 0.59* 0.53* 0.62* 0.66*   BUN mg/dL 13 15 18 24* 26* 36* 42*   BUN / CREAT RATIO  22.4 27.8* 30.0* 40.7* 49.1* 58.1* 63.6*   CALCIUM mg/dL 8.6 8.5 8.9 9.1 9.0 9.2 9.1   EGFR IF NONAFRICN AM mL/min/1.73 131 143 126 129 146 122 113     Estimated Creatinine Clearance: 53.9 mL/min (A) (by C-G formula based on SCr of 0.58 mg/dL (L)).      Results from last 7 days   Lab Units 09/03/20  0707 09/02/20  0457 09/01/20  0536 08/31/20  0606 08/30/20  0718 08/29/20  0344 08/28/20  0508   WBC 10*3/mm3 12.96* 13.38* 9.65 8.13 9.73 13.19* 10.25   RBC 10*6/mm3 2.58* 3.24* 3.13* 3.18* 3.14* 3.39* 2.84*   HEMOGLOBIN g/dL 8.6* 10.8* 10.2* 10.6* 10.5* 11.1* 9.7*   HEMATOCRIT % 26.2* 32.7* 31.0* 32.0* 31.2* 35.0* 27.6*   MCV fL 101.6* 100.9* 99.0* 100.6* 99.4* 103.2* 97.2*   MCH pg 33.3* 33.3* 32.6 33.3* 33.4* 32.7 34.2*   MCHC g/dL 32.8 33.0 32.9 33.1 33.7 31.7 35.1   RDW % 13.9 14.0 14.0 13.6 13.7 13.7 13.6   RDW-SD fl 51.2 51.7 49.9 49.4 47.7 52.6 48.4   MPV fL 10.0 10.6 10.3 10.5 10.9 11.3 10.7   PLATELETS 10*3/mm3 208 213 235 236 218 203 214   NEUTROPHIL % % 78.2* 74.3 63.4 64.4 69.7 70.2 67.7   LYMPHOCYTE % % 8.1* 10.5* 15.0* 14.0* 11.9* 11.8* 9.6*   MONOCYTES % % 9.0 10.2 14.0* 12.9* 12.6* 10.8 14.7*      EOSINOPHIL % % 3.1 3.1 5.7 6.6* 3.5 5.2 6.1   BASOPHIL % % 0.4 0.6 0.6 0.6 0.7 0.5 0.4   IMM GRAN % % 1.2* 1.3* 1.3* 1.5* 1.6* 1.5* 1.5*   NEUTROS ABS 10*3/mm3 10.15* 9.94* 6.11 5.23 6.77 9.24* 6.94   LYMPHS ABS 10*3/mm3 1.05 1.41 1.45 1.14 1.16 1.56 0.98   MONOS ABS 10*3/mm3 1.16* 1.37* 1.35* 1.05* 1.23* 1.43* 1.51*   EOS ABS 10*3/mm3 0.40 0.41* 0.55* 0.54* 0.34 0.69* 0.63*   BASOS ABS 10*3/mm3 0.05 0.08 0.06 0.05 0.07 0.07 0.04   IMMATURE GRANS (ABS) 10*3/mm3 0.15* 0.17* 0.13* 0.12* 0.16* 0.20* 0.15*   NRBC /100 WBC 0.2 0.1 0.2 0.4* 0.2 0.2 0.1                             Microbiology Results (last 10 days)     ** No results found for the last 240 hours. **                fentaNYL 1 patch Transdermal Q72H   And      Check Fentanyl Patch Placement 1 each Does not apply Q12H   citalopram 10 mg Oral Nightly   finasteride 5 mg Oral Nightly   glipizide 2.5 mg Oral QAM AC   insulin regular 0-9 Units Subcutaneous 4x Daily AC & at Bedtime   latanoprost 1 drop Both Eyes Daily   levETIRAcetam 750 mg Oral Q12H   levETIRAcetam 750 mg Oral Q12H   levothyroxine 125 mcg Oral Q AM   lidocaine 3 patch Transdermal Q24H   sodium chloride 10 mL Intravenous Q12H   tamsulosin 0.4 mg Oral Daily   timolol 1 drop Both Eyes Daily       lactated ringers 100 mL/hr Last Rate: 100 mL/hr (09/03/20 0617)       Diagnostics:  Ct Head Without Contrast    Result Date: 8/27/2020  CT HEAD WO CONTRAST-  INDICATIONS: Intracranial hemorrhage, follow-up  TECHNIQUE: Radiation dose reduction techniques were utilized, including automated exposure control and exposure modulation based on body size. Noncontrast head CT  COMPARISON: 08/25/2020  FINDINGS:    Craniotomy changes are redemonstrated on the right, with mildly decreased pneumocephalus. Mixed density subdural collection along the right convexity, 3.4 x 9.7 cm on axial image 43, does not appear significant changed as measured at similar level on the prior exam, with similar local mass effect. Amount  of subarachnoid blood on the right appears stable. Likewise, leftward midline shift does not appear significantly changed, again measuring about 6 mm, axial image 33. Bilateral basal ganglia mineralization is present.  Ventricles, cisterns, cerebral sulci appear stable.  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 Partial opacification of bilateral mastoid air cells. Visualized paranasal sinuses, orbits, mastoid air cells are otherwise unremarkable.           Evolving postsurgical changes. Large mixed density subdural hematoma along the right convexity and right cerebral subarachnoid hemorrhage do not appear significant change with persistent leftward midline shift. Continued follow-up recommended.  This report was finalized on 8/27/2020 1:59 PM by Dr. Eleuterio Sigala M.D.      Ct Head Without Contrast    Result Date: 8/25/2020  CT OF THE HEAD WITHOUT CONTRAST  HISTORY: Subdural hematoma  COMPARISON: 08/24/2020  TECHNIQUE: Axial CT imaging was obtained through the brain. No IV contrast was administered.  FINDINGS: Large mixed density subdural collection is perhaps slightly decreased in size. It measures 10.3 x 2.7 cm, previously 10.3 x 3.1 cm. Degree of midline shift is unchanged. It measures approximately 5 to 6 mm. Areas of subarachnoid hemorrhage are again noted within the right cerebral hemisphere. These do not appear significantly changed. There is diffuse atrophy. There is periventricular and deep white matter microangiopathic change. Volume of pneumocephalus has areas.      Slight interval decrease in previously identified large mixed density right subdural collection. Midline shift remains stable. No new areas of hemorrhage are seen.  Radiation dose reduction techniques were utilized, including automated exposure control and exposure modulation based on body size.  This report was finalized on 8/25/2020 4:12 AM by Dr. Melody Acharya M.D.      Ct Head Without Contrast    Result Date: 8/24/2020  CT HEAD  WITHOUT CONTRAST  HISTORY: Subdural hematoma  COMPARISON: 08/23/2020  TECHNIQUE: Axial CT imaging was obtained through the brain. No IV contrast was administered.  FINDINGS: Since prior examination, the patient's right-sided subdural drain has been removed. The large mixed density subdural collection overlying the right cerebral convexity appears larger. It measures approximately 10.3 x 3.1 cm. Previously, it measured 10.2 x 2.5 cm. However, the degree of associated midline shift is stable at 5-6 mm. There is also subarachnoid hemorrhage seen within the right cerebral hemisphere. This appears stable. The amount of pneumocephalus has decreased when compared to the prior study. No new areas of hemorrhage are seen. 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 Similar findings were present in 2017.      Interval increase in the size of the patient's mixed density extra-axial collection overlying the right cerebral convexity. However, the degree of midline shift is stable at 5-6 millimeters. Areas of subarachnoid hemorrhage are also unchanged.  Radiation dose reduction techniques were utilized, including automated exposure control and exposure modulation based on body size.  This report was finalized on 8/24/2020 4:18 AM by Dr. Melody Acharya M.D.      Ct Head Without Contrast    Result Date: 8/23/2020  CT HEAD WITHOUT CONTRAST  HISTORY: Postop  COMPARISON: 08/22/2020  TECHNIQUE: Axial CT imaging was obtained through the brain. No IV contrast was administered.  FINDINGS: Patient is again noted to be status post right frontal craniotomy. There is a persistent extra-axial, heterogeneous collection. This measures 10.2 x 2.4 cm. This is thicker than on prior study. Near the vertex, this collection measures up to 2.4 cm. Previously, it measured 2.0 cm. Adjacent subarachnoid hemorrhage is noted. This has increased when compared to prior exam. Midline shift has also increased,  measuring up to 5 mm. Previously, it measured 3 mm. There is a subdural drain which is unchanged in position. Volume of pneumocephalus has decreased. There is some increased subdural hyperdense material which is seen more inferiorly when compared to prior exam. There is diffuse atrophy. 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       1. There has been an interval increase in the size of the mixed density collection which is seen deep to the craniotomy flap. It is resulting in increasing midline shift, which is now up to 5 mm, previously measuring 3 mm. The volume of subarachnoid hemorrhage adjacent to the collection has also increased slightly.  FINDINGS were relayed to the patient's nurse at 4:53 AM.  Radiation dose reduction techniques were utilized, including automated exposure control and exposure modulation based on body size.  This report was finalized on 8/23/2020 4:53 AM by Dr. Melody Acharya M.D.      Ct Head Without Contrast    Result Date: 8/22/2020  CT OF THE HEAD WITHOUT CONTRAST  HISTORY: Postop  COMPARISON: 08/21/2020  TECHNIQUE: Axial CT imaging was obtained through the brain. No IV contrast was administered  FINDINGS: The patient is status post right frontal craniotomy with evacuation of subdural hematoma. There is a mixed density subdural collection which appears smaller on the current study, now measuring 2.3 x 2.0 cm, previously measuring up to 10.9 x 3.5 cm. It has more hyperdense material within it than on prior exam, suggesting a more acute component. There is a drain identified within the subdural space. There is expected pneumocephalus. There is midline shift to 3 mm, which is improved when compared to prior scan. There is diffuse atrophy. There is periventricular and deep white matter microangiopathic change. There are bilateral basal ganglia calcifications. 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 Right mastoid air cells appear relatively underpneumatized. This may  reflect changes of chronic mastoiditis/otitis media.      Since prior examination, the patient has undergone right frontal craniotomy with evacuation of a right cerebral convexity subdural hematoma. On the current examination, the hematoma appears smaller, and midline shift has decreased, although there is a persistent more hyperdense collection, suggesting more acute hemorrhage.  Radiation dose reduction techniques were utilized, including automated exposure control and exposure modulation based on body size.  This report was finalized on 8/22/2020 3:40 AM by Dr. Melody Acharya M.D.      Ct Head Without Contrast    Result Date: 8/21/2020  CT OF THE BRAIN WITHOUT CONTRAST 8/21/2020  HISTORY:  Follow-up intracranial hemorrhage.  TECHNIQUE:  Axial images were obtained through the brain without intravenous contrast and compared to the previous study dated 8/20/2020.  FINDINGS:  Again seen is a large oval-shaped subdural hematoma over the right frontoparietal region which measures approximately 10.9 cm by 5.1 cm x 3.0 cm and appears largely unchanged from the previous study. Additional lower density subdural fluid is seen on the right also appearing stable. There is approximately 5 mm midline shift to the left which appear stable.  No new hemorrhage is seen. There is moderate diffuse atrophy.      1.  Large oval-shaped subdural hemorrhage with additional low-density subdural fluid on the right as described. 2. There is midline shift to the left. 3. Findings appear stable since the previous study of 8/20/2020.  Radiation dose reduction techniques were utilized, including automated exposure control and exposure modulation based on body size.  This report was finalized on 8/21/2020 4:35 PM by Dr. Parker Spicer M.D.      Ct Head Without Contrast    Result Date: 8/20/2020  CT HEAD WITHOUT CONTRAST  HISTORY: Head trauma, on blood thinners.  A noncontrasted CT examination of the brain was performed and compared to the  CT examination of 04/18/2018.  FINDINGS: The prior CT examination demonstrated a hygroma overlying the lateral and superolateral aspect of the right frontal lobe. There is now an area of subdural hemorrhage overlying the right frontal and parietal lobe superolaterally. This measures approximately 11 cm in AP dimension. It measures approximately 3 cm in maximum thickness as measured perpendicular to the inner table on the coronal reconstructions and approximately 5 cm in craniocaudal dimension as measured perpendicular to the inner table on the coronal reconstructions. There is approximately 5 mm of midline shift to the left which is new. There is no evidence of intra-axial hemorrhage or of acute infarction. Bone windows show no evidence of calvarial fracture.      Ovoid relatively well demarcated area of subdural hemorrhage overlying the right frontoparietal region measuring approximately 11 x 3 x 5 mm in size with 5 mm of midline shift to the left.  The above information was called to and discussed with Fracisco Rubio.    Radiation dose reduction techniques were utilized, including automated exposure control and exposure modulation based on body size.  This report was finalized on 8/20/2020 4:16 PM by Dr. Naseem Richardson M.D.      Ct Chest With Contrast    Result Date: 8/20/2020  CT CHEST, ABDOMEN, AND PELVIS WITH IV CONTRAST  HISTORY: Fall, right rib trauma  TECHNIQUE: Radiation dose reduction techniques were utilized, including automated exposure control and exposure modulation based on body size. 3 mm images were obtained through the chest, abdomen, and pelvis with IV contrast.  COMPARISON: None  FINDINGS:  Chest:    Small hiatal hernia is present. Mildly enlarged subcarinal node measures 1 cm in short axis dimension. There is no noncalcified hilar or axillary adenopathy by size criteria.  Bilateral gynecomastia is present. Heart is mild to moderately enlarged. There is moderate to extensive coronary calcification.  No significant pericardial effusion is present.  There is no pulmonary consolidation, pleural effusion or pneumothorax. No suspicious pulmonary nodules are visualized.  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  There are no findings of small bowel obstruction. The appendix is unremarkable.  The liver has a heterogenous enhancement pattern. Ill-defined nonmasslike peripheral hyperenhancement within the inferior aspect of the left hepatic lobe is likely perfusional. There is discrete hepatic portal venous shunt extending through the right hepatic lobe. The gallbladder, and spleen have an unremarkable postcontrast CT appearance.  The main pancreatic duct is mildly distended, measuring approximately 0.5 cm in diameter.  Subcentimeter hypodense renal lesions are too small to characterize. Larger cystic density lesions within the right kidney likely represent simple cysts. Indeterminate density lesions within the posterior aspect of the midpole bilateral kidneys measures 1.2 cm on the right and 1.9 cm on the left. There is no hydronephrosis. Symmetric stranding is present along the bilateral renal collecting systems.  Borderline enlarged tara hepatic node measures 1 cm in short axis dimension and is likely reactive.  There is asymmetric swelling and stranding overlying the left gluteal musculature. Irregular tubular contrast density extends through this area.  Colonic diverticulosis is present. Asymmetric free fluid is present along the inferior aspect of the right paracolic gutter and abuts the sigmoid colon. There is no free intraperitoneal air..  The bladder is moderate to severely distended.  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 There is a fracture extending through the syndesmophyte along the anterior aspect of the T10 vertebral body with a fracture plane extending through the T10 vertebral body and T9-10 disc space posteriorly.  Cortical irregularity is  present within the right anterior fourth through ninth ribs and left third through seventh ribs without a visualized fracture plane.  Cortical irregularity along the anterior aspect of the distal sacrum at S4 as well as the coccyx more inferiorly are present without discrete fracture planes visualized.      Impression: 1.  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 Further evaluation with MRI of the thoracic and lumbar spine is recommended to determine the stability of this fracture as well as evaluate for any occult injury given the ankylosis. 2.  Cortical irregularity of multiple bilateral ribs without discrete fracture planes representing fractures of indeterminate age and correlation with point tenderness is recommended. There is no pneumothorax or findings of pulmonary contusion. 3.  Intramuscular hematoma involving the left gluteal musculature with findings of contrast extravasation in this area. While findings are favored be venous in origin, underlying arterial injury cannot be excluded and continued close attention on follow-up of this area is recommended. Findings are further evaluated with CTA if clinically indicated. 4.  Fractures of indeterminate age involving the lower sacrum as detailed above. Findings can be further evaluated with MRI if clinically indicated. 5.  Small amount of free fluid is present within the right lower quadrant and abuts the sigmoid colon. While the colon has an otherwise unremarkable appearance, given the history of trauma, underlying bowel injury cannot be excluded and correlation with serial abdominal exams recommended to exclude this possibility. 6.  Indeterminate bilateral renal lesions. Follow-up with CT or ultrasound of the kidneys and 6 months is recommended to ensure stability. 7.  Other findings as above.  The above findings were discussed with Fracisco Rubio by  telephone by Darrel Pool at 2:20 PM on 08/20/2020   .  This report was finalized on 8/20/2020 2:22 PM by Dr. Darrel Pool M.D.      Mri Brain With & Without Contrast    Result Date: 8/23/2020  MRI BRAIN WITH AND WITHOUT CONTRAST-  08/23/2020  HISTORY: Known subdural hematoma. Speech difficulty. Possible stroke.  TECHNIQUE: Multiple pre and postcontrast sagittal, axial and coronal images were obtained through the brain.  COMPARISON: There are no previous MRI studies available for review.  FINDINGS:  Again seen is a large mixed signal intensity hematoma in the right frontoparietal region measuring up to 10.9 cm x 2.6 cm in transverse dimensions x approximately 4.8 cm in oblique craniocaudal dimension. A drainage catheter is present within the subdural hematoma. There is approximately 5 mm midline shift to the left. There appears to be minimal subarachnoid hemorrhage in the right superior frontal region best seen on FLAIR series 8 images 21-24.  The diffusion-weighted images show no evidence of acute infarction. There is some prominent dural enhancement of the right cerebral hemisphere following gadolinium. No other enhancing lesions are seen.  The craniocervical junction and sella appear normal.      1. No evidence of acute infarction. 2. Large mixed signal intensity right subdural hematoma with slight midline shift to the left. This has been seen on previous CT scans. Tiny amount of subarachnoid hemorrhage is seen in the right cerebral hemisphere as well. 3. These findings were discussed with Dr. Cardenas.   This report was finalized on 8/23/2020 7:47 PM by Dr. Parker Spicer M.D.      Ct Abdomen Pelvis With Contrast    Result Date: 8/20/2020  CT CHEST, ABDOMEN, AND PELVIS WITH IV CONTRAST  HISTORY: Fall, right rib trauma  TECHNIQUE: Radiation dose reduction techniques were utilized, including automated exposure control and exposure modulation based on body size. 3 mm images were obtained through the chest,  abdomen, and pelvis with IV contrast.  COMPARISON: None  FINDINGS:  Chest:    Small hiatal hernia is present. Mildly enlarged subcarinal node measures 1 cm in short axis dimension. There is no noncalcified hilar or axillary adenopathy by size criteria.  Bilateral gynecomastia is present. Heart is mild to moderately enlarged. There is moderate to extensive coronary calcification. No significant pericardial effusion is present.  There is no pulmonary consolidation, pleural effusion or pneumothorax. No suspicious pulmonary nodules are visualized.  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  There are no findings of small bowel obstruction. The appendix is unremarkable.  The liver has a heterogenous enhancement pattern. Ill-defined nonmasslike peripheral hyperenhancement within the inferior aspect of the left hepatic lobe is likely perfusional. There is discrete hepatic portal venous shunt extending through the right hepatic lobe. The gallbladder, and spleen have an unremarkable postcontrast CT appearance.  The main pancreatic duct is mildly distended, measuring approximately 0.5 cm in diameter.  Subcentimeter hypodense renal lesions are too small to characterize. Larger cystic density lesions within the right kidney likely represent simple cysts. Indeterminate density lesions within the posterior aspect of the midpole bilateral kidneys measures 1.2 cm on the right and 1.9 cm on the left. There is no hydronephrosis. Symmetric stranding is present along the bilateral renal collecting systems.  Borderline enlarged tara hepatic node measures 1 cm in short axis dimension and is likely reactive.  There is asymmetric swelling and stranding overlying the left gluteal musculature. Irregular tubular contrast density extends through this area.  Colonic diverticulosis is present. Asymmetric free fluid is present along the inferior aspect of the right paracolic gutter and abuts the sigmoid colon. There is no  free intraperitoneal air..  The bladder is moderate to severely distended.  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 There is a fracture extending through the syndesmophyte along the anterior aspect of the T10 vertebral body with a fracture plane extending through the T10 vertebral body and T9-10 disc space posteriorly.  Cortical irregularity is present within the right anterior fourth through ninth ribs and left third through seventh ribs without a visualized fracture plane.  Cortical irregularity along the anterior aspect of the distal sacrum at S4 as well as the coccyx more inferiorly are present without discrete fracture planes visualized.      Impression: 1.  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 Further evaluation with MRI of the thoracic and lumbar spine is recommended to determine the stability of this fracture as well as evaluate for any occult injury given the ankylosis. 2.  Cortical irregularity of multiple bilateral ribs without discrete fracture planes representing fractures of indeterminate age and correlation with point tenderness is recommended. There is no pneumothorax or findings of pulmonary contusion. 3.  Intramuscular hematoma involving the left gluteal musculature with findings of contrast extravasation in this area. While findings are favored be venous in origin, underlying arterial injury cannot be excluded and continued close attention on follow-up of this area is recommended. Findings are further evaluated with CTA if clinically indicated. 4.  Fractures of indeterminate age involving the lower sacrum as detailed above. Findings can be further evaluated with MRI if clinically indicated. 5.  Small amount of free fluid is present within the right lower quadrant and abuts the sigmoid colon. While the colon has an otherwise unremarkable  appearance, given the history of trauma, underlying bowel injury cannot be excluded and correlation with serial abdominal exams recommended to exclude this possibility. 6.  Indeterminate bilateral renal lesions. Follow-up with CT or ultrasound of the kidneys and 6 months is recommended to ensure stability. 7.  Other findings as above.  The above findings were discussed with Fracisco Rubio by telephone by Darrel Pool at 2:20 PM on 08/20/2020   .  This report was finalized on 8/20/2020 2:22 PM by Dr. Darrel Pool M.D.      Xr Abdomen Kub    Result Date: 8/25/2020  KUB  HISTORY: Feeding tube placement.  FINDINGS: A single view of the upper abdomen demonstrates a feeding tube in place with the tip at the gastroesophageal junction. This needs to be advanced. The above information was called to the patient's nurse.  This report was finalized on 8/25/2020 2:18 PM by Dr. Naseem Ricahrdson M.D.      Results for orders placed during the hospital encounter of 04/17/17   Adult Transthoracic Echo Complete With Contrast    Narrative · Left ventricular function is normal. Estimated EF = 55%.  · Right ventricular cavity is mildly dilated.  · Mildly reduced right ventricular systolic function noted.  · Left atrial cavity size is moderate-to-severely dilated.  · Mild aortic valve regurgitation is present.  · Mild-to-moderate mitral valve regurgitation is present  · Mild aortic valve stenosis is present.              Active Hospital Problems    Diagnosis  POA   • Head injury due to trauma [S09.90XA]  Unknown   • Traumatic subdural hematoma without loss of consciousness (CMS/HCC) [S06.5X0A]  Unknown   • Coagulopathy (CMS/HCC) [D68.9]  Unknown   • SDH (subdural hematoma) (CMS/HCC) [S06.5X9A]  Yes      Resolved Hospital Problems   No resolved problems to display.         Assessment/Plan     1. Traumatic right subdural hematoma with brain compression and without loss of consciousness.  Patient is status post craniotomy for decompression.   Neurosurgery following at this time from their standpoint he could transfer to rehab when other problems are stable  2. Seizure disorder no new seizures have been noted because of his significant lethargy and no seizures decreasing the neuroleptics has apparently helped wake him up some and he has not had any seizure activity will continue to monitor, neurology's help appreciated  3. Coagulopathy on admission secondary to Eliquis  4. Vertebral compression fractures he is having some pain continue the Lidoderm patch because he is just starting to finally wake up I am hesitant to give him significant narcotics.  5. Left gluteal intramuscular hematoma  6. Lower sacral fracture  7. Indeterminate bilateral kidney lesions these will need to follow-up  in a few months.  8. Atrial fibrillation had been on Eliquis now being held secondary to-fall and brain bleed  9. Chronic CHF on chronic therapy   10. hypothyroidism on replacement  11. Hypertension blood pressure appears to be adequately controlled  12. Diabetes mellitus type 2 but sugars appear to be adequately controlled  13. Dementia  14. Bladder carcinoma  15. Urinary retention we will add some Flomax straight cath PRN  16. Anemia mild hemoglobin actually improving  17. Oral pharyngeal dysphagia apparently he did somewhat better today and speech therapy is recommended trying purée and nectar thick liquids they are going to repeat a video swallow study tomorrow so I guess feeding tube is on hold.  18. Pain patient has lots of reasons for pain unfortunately looks like even tiny doses of morphine knock him out and this is not really going to be very good option he is just started to wake up he complains of pain and then he gets a narcotic and he is out again I think we really have to try and do without the morphine and just try and use the low moderate dose hydrocodone orally and continue the Lidoderm he does seem to think it helps    Plan for disposition:     Ajay Prince  MD Piero  09/03/20  09:55    Time:            LOS: 14 days   Patient Care Team:  Jesus Saez MD as PCP - General (Internal Medicine)  Casper Torres MD as PCP - Claims Attributed    Subjective     Patient was able to talk to me a little bit today the only pain he is really having is in his back.  He did walk today with physical therapy and his pain is better when he is up walking.  He fed himself breakfast he just got back from swallow study is a little worn out he has not eaten lunch yet.    Review of Systems:         Objective     Vital Signs  Vital Sign Min/Max for last 24 hours  Temp  Min: 97.6 °F (36.4 °C)  Max: 98.4 °F (36.9 °C)   BP  Min: 81/52  Max: 110/63   Pulse  Min: 78  Max: 86   Resp  Min: 14  Max: 20   SpO2  Min: 96 %  Max: 98 %   No data recorded   Weight  Min: 63.4 kg (139 lb 12.8 oz)  Max: 63.4 kg (139 lb 12.8 oz)        Ventilator/Non-Invasive Ventilation Settings (From admission, onward)    None              Arterial Line BP: 113/55  Arterial Line MAP (mmHg): 74 mmHg     Body mass index is 23.98 kg/m².  I/O last 3 completed shifts:  In: 2595 [P.O.:170; I.V.:1925; IV Piggyback:500]  Out: 2200 [Urine:2200]  No intake/output data recorded.        Physical Exam:  General Appearance: Elderly white male lying in bed in the fetal position he really does not look much different than yesterday although he was able to say hi doc  Eyes: Conjunctiva are clear and anicteric pupils look to be equal about 2 mm  ENT: Mucous membranes are extremely dry  Neck: Trachea midline no visible jugular venous distention  Lungs: Clear does not take the deepest breaths no wheezes no rales no rhonchi he is not labored  Cardiac: Regular rate rhythm  Abdomen: Soft no palpable hepatosplenomegaly or masses  : bladder distnded  Musculoskeletal: Moderate thoracic kyphosis, he has very little muscle mass  Skin: No jaundice no petechiae skin is warm and dry  Neuro: Patient is quite lethargic although a little more  awake than yesterday he was at least able to say something I could understand but he went right back to sleep.  Extremities/P Vascular: No clubbing no cyanosis no edema he has palpable radial and dorsalis pedis pulses  MSE: Unable to assess       Labs:  Results from last 7 days   Lab Units 09/03/20  0501 09/02/20  0457 09/01/20  0536 08/31/20  0606 08/30/20  0718 08/29/20  0344 08/28/20  0508   GLUCOSE mg/dL 80 88 141* 74 111* 153* 102*   SODIUM mmol/L 138 139 144 145 143 139 142   POTASSIUM mmol/L 4.1 3.6 4.2 3.4* 3.6 3.8 3.9   CO2 mmol/L 25.1 27.5 28.8 27.9 28.6 29.5* 32.7*   CHLORIDE mmol/L 105 104 108* 107 104 101 102   ANION GAP mmol/L 7.9 7.5 7.2 10.1 10.4 8.5 7.3   CREATININE mg/dL 0.58* 0.54* 0.60* 0.59* 0.53* 0.62* 0.66*   BUN mg/dL 13 15 18 24* 26* 36* 42*   BUN / CREAT RATIO  22.4 27.8* 30.0* 40.7* 49.1* 58.1* 63.6*   CALCIUM mg/dL 8.6 8.5 8.9 9.1 9.0 9.2 9.1   EGFR IF NONAFRICN AM mL/min/1.73 131 143 126 129 146 122 113     Estimated Creatinine Clearance: 53.9 mL/min (A) (by C-G formula based on SCr of 0.58 mg/dL (L)).      Results from last 7 days   Lab Units 09/03/20  0707 09/02/20  0457 09/01/20  0536 08/31/20  0606 08/30/20  0718 08/29/20  0344 08/28/20  0508   WBC 10*3/mm3 12.96* 13.38* 9.65 8.13 9.73 13.19* 10.25   RBC 10*6/mm3 2.58* 3.24* 3.13* 3.18* 3.14* 3.39* 2.84*   HEMOGLOBIN g/dL 8.6* 10.8* 10.2* 10.6* 10.5* 11.1* 9.7*   HEMATOCRIT % 26.2* 32.7* 31.0* 32.0* 31.2* 35.0* 27.6*   MCV fL 101.6* 100.9* 99.0* 100.6* 99.4* 103.2* 97.2*   MCH pg 33.3* 33.3* 32.6 33.3* 33.4* 32.7 34.2*   MCHC g/dL 32.8 33.0 32.9 33.1 33.7 31.7 35.1   RDW % 13.9 14.0 14.0 13.6 13.7 13.7 13.6   RDW-SD fl 51.2 51.7 49.9 49.4 47.7 52.6 48.4   MPV fL 10.0 10.6 10.3 10.5 10.9 11.3 10.7   PLATELETS 10*3/mm3 208 213 235 236 218 203 214   NEUTROPHIL % % 78.2* 74.3 63.4 64.4 69.7 70.2 67.7   LYMPHOCYTE % % 8.1* 10.5* 15.0* 14.0* 11.9* 11.8* 9.6*   MONOCYTES % % 9.0 10.2 14.0* 12.9* 12.6* 10.8 14.7*   EOSINOPHIL % % 3.1 3.1 5.7  6.6* 3.5 5.2 6.1   BASOPHIL % % 0.4 0.6 0.6 0.6 0.7 0.5 0.4   IMM GRAN % % 1.2* 1.3* 1.3* 1.5* 1.6* 1.5* 1.5*   NEUTROS ABS 10*3/mm3 10.15* 9.94* 6.11 5.23 6.77 9.24* 6.94   LYMPHS ABS 10*3/mm3 1.05 1.41 1.45 1.14 1.16 1.56 0.98   MONOS ABS 10*3/mm3 1.16* 1.37* 1.35* 1.05* 1.23* 1.43* 1.51*   EOS ABS 10*3/mm3 0.40 0.41* 0.55* 0.54* 0.34 0.69* 0.63*   BASOS ABS 10*3/mm3 0.05 0.08 0.06 0.05 0.07 0.07 0.04   IMMATURE GRANS (ABS) 10*3/mm3 0.15* 0.17* 0.13* 0.12* 0.16* 0.20* 0.15*   NRBC /100 WBC 0.2 0.1 0.2 0.4* 0.2 0.2 0.1                             Microbiology Results (last 10 days)     ** No results found for the last 240 hours. **                fentaNYL 1 patch Transdermal Q72H   And      Check Fentanyl Patch Placement 1 each Does not apply Q12H   citalopram 10 mg Oral Nightly   finasteride 5 mg Oral Nightly   glipizide 2.5 mg Oral QAM AC   insulin regular 0-9 Units Subcutaneous 4x Daily AC & at Bedtime   latanoprost 1 drop Both Eyes Daily   levETIRAcetam 750 mg Oral Q12H   levETIRAcetam 750 mg Oral Q12H   levothyroxine 125 mcg Oral Q AM   lidocaine 3 patch Transdermal Q24H   sodium chloride 10 mL Intravenous Q12H   tamsulosin 0.4 mg Oral Daily   timolol 1 drop Both Eyes Daily       lactated ringers 100 mL/hr Last Rate: 100 mL/hr (09/03/20 0617)       Diagnostics:  Ct Head Without Contrast    Result Date: 8/27/2020  CT HEAD WO CONTRAST-  INDICATIONS: Intracranial hemorrhage, follow-up  TECHNIQUE: Radiation dose reduction techniques were utilized, including automated exposure control and exposure modulation based on body size. Noncontrast head CT  COMPARISON: 08/25/2020  FINDINGS:    Craniotomy changes are redemonstrated on the right, with mildly decreased pneumocephalus. Mixed density subdural collection along the right convexity, 3.4 x 9.7 cm on axial image 43, does not appear significant changed as measured at similar level on the prior exam, with similar local mass effect. Amount of subarachnoid blood on the  right appears stable. Likewise, leftward midline shift does not appear significantly changed, again measuring about 6 mm, axial image 33. Bilateral basal ganglia mineralization is present.  Ventricles, cisterns, cerebral sulci appear stable.  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 Partial opacification of bilateral mastoid air cells. Visualized paranasal sinuses, orbits, mastoid air cells are otherwise unremarkable.           Evolving postsurgical changes. Large mixed density subdural hematoma along the right convexity and right cerebral subarachnoid hemorrhage do not appear significant change with persistent leftward midline shift. Continued follow-up recommended.  This report was finalized on 8/27/2020 1:59 PM by Dr. Eleuterio Sigala M.D.      Ct Head Without Contrast    Result Date: 8/25/2020  CT OF THE HEAD WITHOUT CONTRAST  HISTORY: Subdural hematoma  COMPARISON: 08/24/2020  TECHNIQUE: Axial CT imaging was obtained through the brain. No IV contrast was administered.  FINDINGS: Large mixed density subdural collection is perhaps slightly decreased in size. It measures 10.3 x 2.7 cm, previously 10.3 x 3.1 cm. Degree of midline shift is unchanged. It measures approximately 5 to 6 mm. Areas of subarachnoid hemorrhage are again noted within the right cerebral hemisphere. These do not appear significantly changed. There is diffuse atrophy. There is periventricular and deep white matter microangiopathic change. Volume of pneumocephalus has areas.      Slight interval decrease in previously identified large mixed density right subdural collection. Midline shift remains stable. No new areas of hemorrhage are seen.  Radiation dose reduction techniques were utilized, including automated exposure control and exposure modulation based on body size.  This report was finalized on 8/25/2020 4:12 AM by Dr. Melody Acharya M.D.      Ct Head Without Contrast    Result Date: 8/24/2020  CT HEAD WITHOUT CONTRAST  HISTORY:  Subdural hematoma  COMPARISON: 08/23/2020  TECHNIQUE: Axial CT imaging was obtained through the brain. No IV contrast was administered.  FINDINGS: Since prior examination, the patient's right-sided subdural drain has been removed. The large mixed density subdural collection overlying the right cerebral convexity appears larger. It measures approximately 10.3 x 3.1 cm. Previously, it measured 10.2 x 2.5 cm. However, the degree of associated midline shift is stable at 5-6 mm. There is also subarachnoid hemorrhage seen within the right cerebral hemisphere. This appears stable. The amount of pneumocephalus has decreased when compared to the prior study. No new areas of hemorrhage are seen. 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 Similar findings were present in 2017.      Interval increase in the size of the patient's mixed density extra-axial collection overlying the right cerebral convexity. However, the degree of midline shift is stable at 5-6 millimeters. Areas of subarachnoid hemorrhage are also unchanged.  Radiation dose reduction techniques were utilized, including automated exposure control and exposure modulation based on body size.  This report was finalized on 8/24/2020 4:18 AM by Dr. Melody Acharya M.D.      Ct Head Without Contrast    Result Date: 8/23/2020  CT HEAD WITHOUT CONTRAST  HISTORY: Postop  COMPARISON: 08/22/2020  TECHNIQUE: Axial CT imaging was obtained through the brain. No IV contrast was administered.  FINDINGS: Patient is again noted to be status post right frontal craniotomy. There is a persistent extra-axial, heterogeneous collection. This measures 10.2 x 2.4 cm. This is thicker than on prior study. Near the vertex, this collection measures up to 2.4 cm. Previously, it measured 2.0 cm. Adjacent subarachnoid hemorrhage is noted. This has increased when compared to prior exam. Midline shift has also increased, measuring up to 5 mm. Previously,  it measured 3 mm. There is a subdural drain which is unchanged in position. Volume of pneumocephalus has decreased. There is some increased subdural hyperdense material which is seen more inferiorly when compared to prior exam. There is diffuse atrophy. 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       1. There has been an interval increase in the size of the mixed density collection which is seen deep to the craniotomy flap. It is resulting in increasing midline shift, which is now up to 5 mm, previously measuring 3 mm. The volume of subarachnoid hemorrhage adjacent to the collection has also increased slightly.  FINDINGS were relayed to the patient's nurse at 4:53 AM.  Radiation dose reduction techniques were utilized, including automated exposure control and exposure modulation based on body size.  This report was finalized on 8/23/2020 4:53 AM by Dr. Melody Acharya M.D.      Ct Head Without Contrast    Result Date: 8/22/2020  CT OF THE HEAD WITHOUT CONTRAST  HISTORY: Postop  COMPARISON: 08/21/2020  TECHNIQUE: Axial CT imaging was obtained through the brain. No IV contrast was administered  FINDINGS: The patient is status post right frontal craniotomy with evacuation of subdural hematoma. There is a mixed density subdural collection which appears smaller on the current study, now measuring 2.3 x 2.0 cm, previously measuring up to 10.9 x 3.5 cm. It has more hyperdense material within it than on prior exam, suggesting a more acute component. There is a drain identified within the subdural space. There is expected pneumocephalus. There is midline shift to 3 mm, which is improved when compared to prior scan. There is diffuse atrophy. There is periventricular and deep white matter microangiopathic change. There are bilateral basal ganglia calcifications. 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 Right mastoid air cells appear relatively underpneumatized. This may reflect changes of chronic  mastoiditis/otitis media.      Since prior examination, the patient has undergone right frontal craniotomy with evacuation of a right cerebral convexity subdural hematoma. On the current examination, the hematoma appears smaller, and midline shift has decreased, although there is a persistent more hyperdense collection, suggesting more acute hemorrhage.  Radiation dose reduction techniques were utilized, including automated exposure control and exposure modulation based on body size.  This report was finalized on 8/22/2020 3:40 AM by Dr. Melody Acharya M.D.      Ct Head Without Contrast    Result Date: 8/21/2020  CT OF THE BRAIN WITHOUT CONTRAST 8/21/2020  HISTORY:  Follow-up intracranial hemorrhage.  TECHNIQUE:  Axial images were obtained through the brain without intravenous contrast and compared to the previous study dated 8/20/2020.  FINDINGS:  Again seen is a large oval-shaped subdural hematoma over the right frontoparietal region which measures approximately 10.9 cm by 5.1 cm x 3.0 cm and appears largely unchanged from the previous study. Additional lower density subdural fluid is seen on the right also appearing stable. There is approximately 5 mm midline shift to the left which appear stable.  No new hemorrhage is seen. There is moderate diffuse atrophy.      1.  Large oval-shaped subdural hemorrhage with additional low-density subdural fluid on the right as described. 2. There is midline shift to the left. 3. Findings appear stable since the previous study of 8/20/2020.  Radiation dose reduction techniques were utilized, including automated exposure control and exposure modulation based on body size.  This report was finalized on 8/21/2020 4:35 PM by Dr. Parker Spicer M.D.      Ct Head Without Contrast    Result Date: 8/20/2020  CT HEAD WITHOUT CONTRAST  HISTORY: Head trauma, on blood thinners.  A noncontrasted CT examination of the brain was performed and compared to the CT examination of  04/18/2018.  FINDINGS: The prior CT examination demonstrated a hygroma overlying the lateral and superolateral aspect of the right frontal lobe. There is now an area of subdural hemorrhage overlying the right frontal and parietal lobe superolaterally. This measures approximately 11 cm in AP dimension. It measures approximately 3 cm in maximum thickness as measured perpendicular to the inner table on the coronal reconstructions and approximately 5 cm in craniocaudal dimension as measured perpendicular to the inner table on the coronal reconstructions. There is approximately 5 mm of midline shift to the left which is new. There is no evidence of intra-axial hemorrhage or of acute infarction. Bone windows show no evidence of calvarial fracture.      Ovoid relatively well demarcated area of subdural hemorrhage overlying the right frontoparietal region measuring approximately 11 x 3 x 5 mm in size with 5 mm of midline shift to the left.  The above information was called to and discussed with Fracisco Rubio.    Radiation dose reduction techniques were utilized, including automated exposure control and exposure modulation based on body size.  This report was finalized on 8/20/2020 4:16 PM by Dr. Naseem Richardson M.D.      Ct Chest With Contrast    Result Date: 8/20/2020  CT CHEST, ABDOMEN, AND PELVIS WITH IV CONTRAST  HISTORY: Fall, right rib trauma  TECHNIQUE: Radiation dose reduction techniques were utilized, including automated exposure control and exposure modulation based on body size. 3 mm images were obtained through the chest, abdomen, and pelvis with IV contrast.  COMPARISON: None  FINDINGS:  Chest:    Small hiatal hernia is present. Mildly enlarged subcarinal node measures 1 cm in short axis dimension. There is no noncalcified hilar or axillary adenopathy by size criteria.  Bilateral gynecomastia is present. Heart is mild to moderately enlarged. There is moderate to extensive coronary calcification. No significant  pericardial effusion is present.  There is no pulmonary consolidation, pleural effusion or pneumothorax. No suspicious pulmonary nodules are visualized.  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  There are no findings of small bowel obstruction. The appendix is unremarkable.  The liver has a heterogenous enhancement pattern. Ill-defined nonmasslike peripheral hyperenhancement within the inferior aspect of the left hepatic lobe is likely perfusional. There is discrete hepatic portal venous shunt extending through the right hepatic lobe. The gallbladder, and spleen have an unremarkable postcontrast CT appearance.  The main pancreatic duct is mildly distended, measuring approximately 0.5 cm in diameter.  Subcentimeter hypodense renal lesions are too small to characterize. Larger cystic density lesions within the right kidney likely represent simple cysts. Indeterminate density lesions within the posterior aspect of the midpole bilateral kidneys measures 1.2 cm on the right and 1.9 cm on the left. There is no hydronephrosis. Symmetric stranding is present along the bilateral renal collecting systems.  Borderline enlarged tara hepatic node measures 1 cm in short axis dimension and is likely reactive.  There is asymmetric swelling and stranding overlying the left gluteal musculature. Irregular tubular contrast density extends through this area.  Colonic diverticulosis is present. Asymmetric free fluid is present along the inferior aspect of the right paracolic gutter and abuts the sigmoid colon. There is no free intraperitoneal air..  The bladder is moderate to severely distended.  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 There is a fracture extending through the syndesmophyte along the anterior aspect of the T10 vertebral body with a fracture plane extending through the T10 vertebral body and T9-10 disc space posteriorly.  Cortical irregularity is present within  the right anterior fourth through ninth ribs and left third through seventh ribs without a visualized fracture plane.  Cortical irregularity along the anterior aspect of the distal sacrum at S4 as well as the coccyx more inferiorly are present without discrete fracture planes visualized.      Impression: 1.  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 Further evaluation with MRI of the thoracic and lumbar spine is recommended to determine the stability of this fracture as well as evaluate for any occult injury given the ankylosis. 2.  Cortical irregularity of multiple bilateral ribs without discrete fracture planes representing fractures of indeterminate age and correlation with point tenderness is recommended. There is no pneumothorax or findings of pulmonary contusion. 3.  Intramuscular hematoma involving the left gluteal musculature with findings of contrast extravasation in this area. While findings are favored be venous in origin, underlying arterial injury cannot be excluded and continued close attention on follow-up of this area is recommended. Findings are further evaluated with CTA if clinically indicated. 4.  Fractures of indeterminate age involving the lower sacrum as detailed above. Findings can be further evaluated with MRI if clinically indicated. 5.  Small amount of free fluid is present within the right lower quadrant and abuts the sigmoid colon. While the colon has an otherwise unremarkable appearance, given the history of trauma, underlying bowel injury cannot be excluded and correlation with serial abdominal exams recommended to exclude this possibility. 6.  Indeterminate bilateral renal lesions. Follow-up with CT or ultrasound of the kidneys and 6 months is recommended to ensure stability. 7.  Other findings as above.  The above findings were discussed with Fracisco Rubio by telephone by  Darrel Pool at 2:20 PM on 08/20/2020   .  This report was finalized on 8/20/2020 2:22 PM by Dr. Darrel Pool M.D.      Mri Brain With & Without Contrast    Result Date: 8/23/2020  MRI BRAIN WITH AND WITHOUT CONTRAST-  08/23/2020  HISTORY: Known subdural hematoma. Speech difficulty. Possible stroke.  TECHNIQUE: Multiple pre and postcontrast sagittal, axial and coronal images were obtained through the brain.  COMPARISON: There are no previous MRI studies available for review.  FINDINGS:  Again seen is a large mixed signal intensity hematoma in the right frontoparietal region measuring up to 10.9 cm x 2.6 cm in transverse dimensions x approximately 4.8 cm in oblique craniocaudal dimension. A drainage catheter is present within the subdural hematoma. There is approximately 5 mm midline shift to the left. There appears to be minimal subarachnoid hemorrhage in the right superior frontal region best seen on FLAIR series 8 images 21-24.  The diffusion-weighted images show no evidence of acute infarction. There is some prominent dural enhancement of the right cerebral hemisphere following gadolinium. No other enhancing lesions are seen.  The craniocervical junction and sella appear normal.      1. No evidence of acute infarction. 2. Large mixed signal intensity right subdural hematoma with slight midline shift to the left. This has been seen on previous CT scans. Tiny amount of subarachnoid hemorrhage is seen in the right cerebral hemisphere as well. 3. These findings were discussed with Dr. Cardenas.   This report was finalized on 8/23/2020 7:47 PM by Dr. Parker Spicer M.D.      Ct Abdomen Pelvis With Contrast    Result Date: 8/20/2020  CT CHEST, ABDOMEN, AND PELVIS WITH IV CONTRAST  HISTORY: Fall, right rib trauma  TECHNIQUE: Radiation dose reduction techniques were utilized, including automated exposure control and exposure modulation based on body size. 3 mm images were obtained through the chest, abdomen, and  pelvis with IV contrast.  COMPARISON: None  FINDINGS:  Chest:    Small hiatal hernia is present. Mildly enlarged subcarinal node measures 1 cm in short axis dimension. There is no noncalcified hilar or axillary adenopathy by size criteria.  Bilateral gynecomastia is present. Heart is mild to moderately enlarged. There is moderate to extensive coronary calcification. No significant pericardial effusion is present.  There is no pulmonary consolidation, pleural effusion or pneumothorax. No suspicious pulmonary nodules are visualized.  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  There are no findings of small bowel obstruction. The appendix is unremarkable.  The liver has a heterogenous enhancement pattern. Ill-defined nonmasslike peripheral hyperenhancement within the inferior aspect of the left hepatic lobe is likely perfusional. There is discrete hepatic portal venous shunt extending through the right hepatic lobe. The gallbladder, and spleen have an unremarkable postcontrast CT appearance.  The main pancreatic duct is mildly distended, measuring approximately 0.5 cm in diameter.  Subcentimeter hypodense renal lesions are too small to characterize. Larger cystic density lesions within the right kidney likely represent simple cysts. Indeterminate density lesions within the posterior aspect of the midpole bilateral kidneys measures 1.2 cm on the right and 1.9 cm on the left. There is no hydronephrosis. Symmetric stranding is present along the bilateral renal collecting systems.  Borderline enlarged tara hepatic node measures 1 cm in short axis dimension and is likely reactive.  There is asymmetric swelling and stranding overlying the left gluteal musculature. Irregular tubular contrast density extends through this area.  Colonic diverticulosis is present. Asymmetric free fluid is present along the inferior aspect of the right paracolic gutter and abuts the sigmoid colon. There is no free  intraperitoneal air..  The bladder is moderate to severely distended.  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 There is a fracture extending through the syndesmophyte along the anterior aspect of the T10 vertebral body with a fracture plane extending through the T10 vertebral body and T9-10 disc space posteriorly.  Cortical irregularity is present within the right anterior fourth through ninth ribs and left third through seventh ribs without a visualized fracture plane.  Cortical irregularity along the anterior aspect of the distal sacrum at S4 as well as the coccyx more inferiorly are present without discrete fracture planes visualized.      Impression: 1.  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 Further evaluation with MRI of the thoracic and lumbar spine is recommended to determine the stability of this fracture as well as evaluate for any occult injury given the ankylosis. 2.  Cortical irregularity of multiple bilateral ribs without discrete fracture planes representing fractures of indeterminate age and correlation with point tenderness is recommended. There is no pneumothorax or findings of pulmonary contusion. 3.  Intramuscular hematoma involving the left gluteal musculature with findings of contrast extravasation in this area. While findings are favored be venous in origin, underlying arterial injury cannot be excluded and continued close attention on follow-up of this area is recommended. Findings are further evaluated with CTA if clinically indicated. 4.  Fractures of indeterminate age involving the lower sacrum as detailed above. Findings can be further evaluated with MRI if clinically indicated. 5.  Small amount of free fluid is present within the right lower quadrant and abuts the sigmoid colon. While the colon has an otherwise unremarkable appearance,  given the history of trauma, underlying bowel injury cannot be excluded and correlation with serial abdominal exams recommended to exclude this possibility. 6.  Indeterminate bilateral renal lesions. Follow-up with CT or ultrasound of the kidneys and 6 months is recommended to ensure stability. 7.  Other findings as above.  The above findings were discussed with Fracisco Rubio by telephone by Darrel Pool at 2:20 PM on 08/20/2020   .  This report was finalized on 8/20/2020 2:22 PM by Dr. Darrel Pool M.D.      Xr Abdomen Kub    Result Date: 8/25/2020  KUB  HISTORY: Feeding tube placement.  FINDINGS: A single view of the upper abdomen demonstrates a feeding tube in place with the tip at the gastroesophageal junction. This needs to be advanced. The above information was called to the patient's nurse.  This report was finalized on 8/25/2020 2:18 PM by Dr. Naseem Richardson M.D.      Results for orders placed during the hospital encounter of 04/17/17   Adult Transthoracic Echo Complete With Contrast    Narrative · Left ventricular function is normal. Estimated EF = 55%.  · Right ventricular cavity is mildly dilated.  · Mildly reduced right ventricular systolic function noted.  · Left atrial cavity size is moderate-to-severely dilated.  · Mild aortic valve regurgitation is present.  · Mild-to-moderate mitral valve regurgitation is present  · Mild aortic valve stenosis is present.              Active Hospital Problems    Diagnosis  POA   • Head injury due to trauma [S09.90XA]  Unknown   • Traumatic subdural hematoma without loss of consciousness (CMS/HCC) [S06.5X0A]  Unknown   • Coagulopathy (CMS/HCC) [D68.9]  Unknown   • SDH (subdural hematoma) (CMS/HCC) [S06.5X9A]  Yes      Resolved Hospital Problems   No resolved problems to display.     A CT head reviewed and the report persistent large right subdural hematoma and some a little frontal subarachnoid hemorrhage all normal evolution no definite acute change from the previous  CT    Assessment/Plan     19. Traumatic right subdural hematoma with brain compression and without loss of consciousness.  Patient is status post craniotomy for decompression.  Neurosurgery following at this time from their standpoint he could transfer to rehab when other problems are stable  20. Seizure disorder no new seizures have been noted because of his significant lethargy and no seizures decreasing the neuroleptics has apparently helped wake him up some and he has not had any seizure activity will continue to monitor, neurology's help appreciated  21. Coagulopathy on admission secondary to Eliquis  22. Vertebral compression fractures he is having some pain continue the Lidoderm patch because he is just starting to finally wake up I am hesitant to give him significant narcotics.  23. Left gluteal intramuscular hematoma  24. Lower sacral fracture  25. Indeterminate bilateral kidney lesions these will need to follow-up  in a few months.  26. Atrial fibrillation had been on Eliquis now being held secondary to-fall and brain bleed  27. Chronic CHF on chronic therapy I have held his Lasix today we will have to watch closely  28. hypothyroidism on replacement  29. Hypertension blood pressure appears to be adequately controlled  30. Diabetes mellitus type 2 but sugars appear to be adequately controlled  31. Dementia  32. Bladder carcinoma  33. Urinary retention he now has a Parks catheter unfortunately he cannot take the Flomax or the finasteride because neither can be crushed.  I do not want to give him an alpha blocker with his blood pressure marginal could be crushed.  34. Anemia mild hemoglobin back down I wonder if yesterday's was accurate and jumped up but is  to where it was  35. Oral pharyngeal dysphagia speech is following he is taking some p.o. not enough yet.  36. Pain patient has lots of reasons for pain unfortunately he does not tolerate narcotic pain medications very well, it looks like even  the fentanyl patch caused hypotension,of course I think he was a little dry and we have corrected that.  The daughter wants pain to be treated as the primary and so does the patient and he is now DO NOT INTUBATE DO NOT RESUSCITATE no aggressive therapy.  37. Constipation suppository and enemas as needed I think the narcotics are probably contributing along with the decreased mobility I am going to try a dose of Relistor with these interventions he has had a bowel movement.  38. Hypotension blood pressure better I am going to keep him on 100 cc IV fluids and not restart any diuretics he does not seem to be in failure at this time his pressure is better not great I want to be able to give him more pain medication because he is clearly in pain    Plan for disposition:     Ajay Harry MD  09/03/20  09:55    Time:

## 2020-09-03 NOTE — PLAN OF CARE
"  Problem: Patient Care Overview  Goal: Plan of Care Review  Outcome: Ongoing (interventions implemented as appropriate)  Flowsheets (Taken 9/3/2020 1594)  Progress: no change  Plan of Care Reviewed With: patient  Outcome Summary: Patient c/o continuous pain throughout the night. Patient treated with Fentanyl patch and PRN Tylenol. Patient has stated numerous times overnight that \" I'm miserable, I am ready to go.\" Patient turned as patient allowed. Patient did have 2 BM's yesterday evening and 1 BM overnight. Parks remains in place, no signs of blood. Labs for the morning. Will continue to monitor.     Problem: Fall Risk (Adult)  Goal: Absence of Fall  Outcome: Ongoing (interventions implemented as appropriate)     Problem: Skin Injury Risk (Adult)  Goal: Skin Health and Integrity  Outcome: Ongoing (interventions implemented as appropriate)     Problem: Confusion, Acute (Adult)  Goal: Cognitive/Functional Impairments Minimized  Outcome: Ongoing (interventions implemented as appropriate)     "

## 2020-09-03 NOTE — PROGRESS NOTES
Palliative Care received call from patient's daughter further inquiring about palliative care. Dgtr stated that family is leaning towards comfort measures only but wanted to speak with patient again to make sure that is what he wants.     Discussed inpatient palliative care unit, explained symptom management including possibility of IV meds if patient's current pain regimen is not helping to alleviate his pain. Dgtr wanting to know about patient being able to eat, sit in chair, and receive an enema if needed. Discussed eating for comfort if patient desires and sitting in chair if alert enough and wishes to sit and also doing interventions that would aide to his comfort such as an enema if needed.     Explained likelihood for hospice consult for possibility [facility]SB vs d/c disposition w/hospice service pending on what his symptom management needs will be. Dgtr voiced understanding and reported that family is still discussing and deciding. Encouraged her to reach out if she has further questions or needs. Palliative Care will continue to follow. Updated RN.

## 2020-09-04 NOTE — PROGRESS NOTES
LOS: 15 days   Patient Care Team:  Jesus Saez MD as PCP - General (Internal Medicine)  Casper Torres MD as PCP - Claims Attributed    Subjective     I have seen patient twice today I saw him this morning no family was present he did talk to me a little bit he was still having some pain he said it was better than yesterday asking what I could do to help him he said just get me out of pain.    I came back this afternoon because the nurse called me and said that the family had decided they wanted to go to palliative care the wife and son were up here and they did confirm that I told him I really need to speak with the patient's daughter Vianey who is the healthcare surrogate just because of the misunderstandings earlier which were initially had made him a DNR and the mother and son revoked that we were not aware that this he was the healthcare surrogate and she was surprised when she came in that it had been revoked and she reinstated the DNR now she is requesting full palliative care and it seems like the family is all in agreement now although the wife is still having a little bit of trouble comprehending the whole situation.  Review of Systems:         Objective     Vital Signs  Vital Sign Min/Max for last 24 hours  Temp  Min: 97.3 °F (36.3 °C)  Max: 98 °F (36.7 °C)   BP  Min: 79/53  Max: 112/72   Pulse  Min: 72  Max: 94   Resp  Min: 16  Max: 18   SpO2  Min: 95 %  Max: 98 %   No data recorded   Weight  Min: 63.8 kg (140 lb 11.2 oz)  Max: 63.8 kg (140 lb 11.2 oz)        Ventilator/Non-Invasive Ventilation Settings (From admission, onward)    None              Arterial Line BP: 113/55  Arterial Line MAP (mmHg): 74 mmHg     Body mass index is 24.14 kg/m².  I/O last 3 completed shifts:  In: 2268 [P.O.:318; I.V.:1900; IV Piggyback:50]  Out: 1375 [Urine:1375]  No intake/output data recorded.        Physical Exam:  General Appearance: Elderly white male lying in bed.  He does not look like he feels  well  Eyes: Conjunctiva are clear and anicteric pupils look to be equal about 3 mm  ENT: Mucous membranes are extremely dry post craniotomy changes the incision looks to be healing well no erythema no drainage  Neck: Trachea midline no visible jugular venous distention  Lungs: Clear does not take the deepest breaths no wheezes no rales no rhonchi he is not labored  Cardiac: Regular rate rhythm heart rates in the 80s  Abdomen: Soft no palpable hepatosplenomegaly or masses no tenderness  : bladder distended  Musculoskeletal: Moderate thoracic kyphosis, he has very little muscle mass.  He does have some spinal tenderness in his back along the lower thoracic vertebrae, cannot really spot tenderness at multiple areas  Skin: No jaundice no petechiae skin is warm and dry  Neuro: He is  more awake today he is very weak  Extremities/P Vascular: No clubbing no cyanosis no edema he has palpable radial and dorsalis pedis pulses  MSE: Very flat affect  Not much change in exam from yesterday.     Labs:  Results from last 7 days   Lab Units 09/04/20  0643 09/03/20  0501 09/02/20  0457 09/01/20  0536 08/31/20  0606 08/30/20  0718 08/29/20  0344   GLUCOSE mg/dL 67 80 88 141* 74 111* 153*   SODIUM mmol/L 137 138 139 144 145 143 139   POTASSIUM mmol/L 3.8 4.1 3.6 4.2 3.4* 3.6 3.8   CO2 mmol/L 20.3* 25.1 27.5 28.8 27.9 28.6 29.5*   CHLORIDE mmol/L 106 105 104 108* 107 104 101   ANION GAP mmol/L 10.7 7.9 7.5 7.2 10.1 10.4 8.5   CREATININE mg/dL 0.45* 0.58* 0.54* 0.60* 0.59* 0.53* 0.62*   BUN mg/dL 12 13 15 18 24* 26* 36*   BUN / CREAT RATIO  26.7* 22.4 27.8* 30.0* 40.7* 49.1* 58.1*   CALCIUM mg/dL 7.8* 8.6 8.5 8.9 9.1 9.0 9.2   EGFR IF NONAFRICN AM mL/min/1.73 >150 131 143 126 129 146 122     Estimated Creatinine Clearance: 54.3 mL/min (A) (by C-G formula based on SCr of 0.45 mg/dL (L)).      Results from last 7 days   Lab Units 09/04/20  0643 09/03/20  0707 09/02/20  0457 09/01/20  0536 08/31/20  0606 08/30/20  0718 08/29/20  0344      WBC 10*3/mm3 11.42* 12.96* 13.38* 9.65 8.13 9.73 13.19*   RBC 10*6/mm3 3.02* 2.58* 3.24* 3.13* 3.18* 3.14* 3.39*   HEMOGLOBIN g/dL 9.9* 8.6* 10.8* 10.2* 10.6* 10.5* 11.1*   HEMATOCRIT % 32.2* 26.2* 32.7* 31.0* 32.0* 31.2* 35.0*   MCV fL 106.6* 101.6* 100.9* 99.0* 100.6* 99.4* 103.2*   MCH pg 32.8 33.3* 33.3* 32.6 33.3* 33.4* 32.7   MCHC g/dL 30.7* 32.8 33.0 32.9 33.1 33.7 31.7   RDW % 13.7 13.9 14.0 14.0 13.6 13.7 13.7   RDW-SD fl 53.8 51.2 51.7 49.9 49.4 47.7 52.6   MPV fL 10.3 10.0 10.6 10.3 10.5 10.9 11.3   PLATELETS 10*3/mm3 238 208 213 235 236 218 203   NEUTROPHIL % %  --  78.2* 74.3 63.4 64.4 69.7 70.2   LYMPHOCYTE % %  --  8.1* 10.5* 15.0* 14.0* 11.9* 11.8*   MONOCYTES % %  --  9.0 10.2 14.0* 12.9* 12.6* 10.8   EOSINOPHIL % %  --  3.1 3.1 5.7 6.6* 3.5 5.2   BASOPHIL % %  --  0.4 0.6 0.6 0.6 0.7 0.5   IMM GRAN % %  --  1.2* 1.3* 1.3* 1.5* 1.6* 1.5*   NEUTROS ABS 10*3/mm3 10.93* 10.15* 9.94* 6.11 5.23 6.77 9.24*   LYMPHS ABS 10*3/mm3  --  1.05 1.41 1.45 1.14 1.16 1.56   MONOS ABS 10*3/mm3  --  1.16* 1.37* 1.35* 1.05* 1.23* 1.43*   EOS ABS 10*3/mm3 0.25 0.40 0.41* 0.55* 0.54* 0.34 0.69*   BASOS ABS 10*3/mm3  --  0.05 0.08 0.06 0.05 0.07 0.07   IMMATURE GRANS (ABS) 10*3/mm3  --  0.15* 0.17* 0.13* 0.12* 0.16* 0.20*   NRBC /100 WBC  --  0.2 0.1 0.2 0.4* 0.2 0.2                             Microbiology Results (last 10 days)     ** No results found for the last 240 hours. **                fentaNYL 1 patch Transdermal Q72H   And      Check Fentanyl Patch Placement 1 each Does not apply Q12H   citalopram 10 mg Oral Nightly   finasteride 5 mg Oral Nightly   glipizide 2.5 mg Oral QAM AC   insulin regular 0-9 Units Subcutaneous 4x Daily AC & at Bedtime   latanoprost 1 drop Both Eyes Daily   levETIRAcetam 750 mg Oral Q12H   levETIRAcetam 750 mg Oral Q12H   levothyroxine 125 mcg Oral Q AM   lidocaine 3 patch Transdermal Q24H   sodium chloride 10 mL Intravenous Q12H   tamsulosin 0.4 mg Oral Daily   timolol 1 drop Both  Eyes Daily       lactated ringers 100 mL/hr Last Rate: 100 mL/hr (09/04/20 0141)       Diagnostics:  Ct Head Without Contrast    Result Date: 8/27/2020  CT HEAD WO CONTRAST-  INDICATIONS: Intracranial hemorrhage, follow-up  TECHNIQUE: Radiation dose reduction techniques were utilized, including automated exposure control and exposure modulation based on body size. Noncontrast head CT  COMPARISON: 08/25/2020  FINDINGS:    Craniotomy changes are redemonstrated on the right, with mildly decreased pneumocephalus. Mixed density subdural collection along the right convexity, 3.4 x 9.7 cm on axial image 43, does not appear significant changed as measured at similar level on the prior exam, with similar local mass effect. Amount of subarachnoid blood on the right appears stable. Likewise, leftward midline shift does not appear significantly changed, again measuring about 6 mm, axial image 33. Bilateral basal ganglia mineralization is present.  Ventricles, cisterns, cerebral sulci appear stable.  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 Partial opacification of bilateral mastoid air cells. Visualized paranasal sinuses, orbits, mastoid air cells are otherwise unremarkable.           Evolving postsurgical changes. Large mixed density subdural hematoma along the right convexity and right cerebral subarachnoid hemorrhage do not appear significant change with persistent leftward midline shift. Continued follow-up recommended.  This report was finalized on 8/27/2020 1:59 PM by Dr. Eleuterio Sigala M.D.      Ct Head Without Contrast    Result Date: 8/25/2020  CT OF THE HEAD WITHOUT CONTRAST  HISTORY: Subdural hematoma  COMPARISON: 08/24/2020  TECHNIQUE: Axial CT imaging was obtained through the brain. No IV contrast was administered.  FINDINGS: Large mixed density subdural collection is perhaps slightly decreased in size. It measures 10.3 x 2.7 cm, previously 10.3 x 3.1 cm. Degree of midline shift is unchanged. It measures  approximately 5 to 6 mm. Areas of subarachnoid hemorrhage are again noted within the right cerebral hemisphere. These do not appear significantly changed. There is diffuse atrophy. There is periventricular and deep white matter microangiopathic change. Volume of pneumocephalus has areas.      Slight interval decrease in previously identified large mixed density right subdural collection. Midline shift remains stable. No new areas of hemorrhage are seen.  Radiation dose reduction techniques were utilized, including automated exposure control and exposure modulation based on body size.  This report was finalized on 8/25/2020 4:12 AM by Dr. Melody Acharya M.D.      Ct Head Without Contrast    Result Date: 8/24/2020  CT HEAD WITHOUT CONTRAST  HISTORY: Subdural hematoma  COMPARISON: 08/23/2020  TECHNIQUE: Axial CT imaging was obtained through the brain. No IV contrast was administered.  FINDINGS: Since prior examination, the patient's right-sided subdural drain has been removed. The large mixed density subdural collection overlying the right cerebral convexity appears larger. It measures approximately 10.3 x 3.1 cm. Previously, it measured 10.2 x 2.5 cm. However, the degree of associated midline shift is stable at 5-6 mm. There is also subarachnoid hemorrhage seen within the right cerebral hemisphere. This appears stable. The amount of pneumocephalus has decreased when compared to the prior study. No new areas of hemorrhage are seen. 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 Similar findings were present in 2017.      Interval increase in the size of the patient's mixed density extra-axial collection overlying the right cerebral convexity. However, the degree of midline shift is stable at 5-6 millimeters. Areas of subarachnoid hemorrhage are also unchanged.  Radiation dose reduction techniques were utilized, including automated exposure control and exposure modulation based  on body size.  This report was finalized on 8/24/2020 4:18 AM by Dr. Melody Acharya M.D.      Ct Head Without Contrast    Result Date: 8/23/2020  CT HEAD WITHOUT CONTRAST  HISTORY: Postop  COMPARISON: 08/22/2020  TECHNIQUE: Axial CT imaging was obtained through the brain. No IV contrast was administered.  FINDINGS: Patient is again noted to be status post right frontal craniotomy. There is a persistent extra-axial, heterogeneous collection. This measures 10.2 x 2.4 cm. This is thicker than on prior study. Near the vertex, this collection measures up to 2.4 cm. Previously, it measured 2.0 cm. Adjacent subarachnoid hemorrhage is noted. This has increased when compared to prior exam. Midline shift has also increased, measuring up to 5 mm. Previously, it measured 3 mm. There is a subdural drain which is unchanged in position. Volume of pneumocephalus has decreased. There is some increased subdural hyperdense material which is seen more inferiorly when compared to prior exam. There is diffuse atrophy. 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       1. There has been an interval increase in the size of the mixed density collection which is seen deep to the craniotomy flap. It is resulting in increasing midline shift, which is now up to 5 mm, previously measuring 3 mm. The volume of subarachnoid hemorrhage adjacent to the collection has also increased slightly.  FINDINGS were relayed to the patient's nurse at 4:53 AM.  Radiation dose reduction techniques were utilized, including automated exposure control and exposure modulation based on body size.  This report was finalized on 8/23/2020 4:53 AM by Dr. Melody Acharya M.D.      Ct Head Without Contrast    Result Date: 8/22/2020  CT OF THE HEAD WITHOUT CONTRAST  HISTORY: Postop  COMPARISON: 08/21/2020  TECHNIQUE: Axial CT imaging was obtained through the brain. No IV contrast was administered  FINDINGS: The patient is status post right frontal craniotomy  with evacuation of subdural hematoma. There is a mixed density subdural collection which appears smaller on the current study, now measuring 2.3 x 2.0 cm, previously measuring up to 10.9 x 3.5 cm. It has more hyperdense material within it than on prior exam, suggesting a more acute component. There is a drain identified within the subdural space. There is expected pneumocephalus. There is midline shift to 3 mm, which is improved when compared to prior scan. There is diffuse atrophy. There is periventricular and deep white matter microangiopathic change. There are bilateral basal ganglia calcifications. 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 Right mastoid air cells appear relatively underpneumatized. This may reflect changes of chronic mastoiditis/otitis media.      Since prior examination, the patient has undergone right frontal craniotomy with evacuation of a right cerebral convexity subdural hematoma. On the current examination, the hematoma appears smaller, and midline shift has decreased, although there is a persistent more hyperdense collection, suggesting more acute hemorrhage.  Radiation dose reduction techniques were utilized, including automated exposure control and exposure modulation based on body size.  This report was finalized on 8/22/2020 3:40 AM by Dr. Melody Acharya M.D.      Ct Head Without Contrast    Result Date: 8/21/2020  CT OF THE BRAIN WITHOUT CONTRAST 8/21/2020  HISTORY:  Follow-up intracranial hemorrhage.  TECHNIQUE:  Axial images were obtained through the brain without intravenous contrast and compared to the previous study dated 8/20/2020.  FINDINGS:  Again seen is a large oval-shaped subdural hematoma over the right frontoparietal region which measures approximately 10.9 cm by 5.1 cm x 3.0 cm and appears largely unchanged from the previous study. Additional lower density subdural fluid is seen on the right also appearing stable. There is approximately 5 mm midline shift to  the left which appear stable.  No new hemorrhage is seen. There is moderate diffuse atrophy.      1.  Large oval-shaped subdural hemorrhage with additional low-density subdural fluid on the right as described. 2. There is midline shift to the left. 3. Findings appear stable since the previous study of 8/20/2020.  Radiation dose reduction techniques were utilized, including automated exposure control and exposure modulation based on body size.  This report was finalized on 8/21/2020 4:35 PM by Dr. Parker Spicer M.D.      Ct Head Without Contrast    Result Date: 8/20/2020  CT HEAD WITHOUT CONTRAST  HISTORY: Head trauma, on blood thinners.  A noncontrasted CT examination of the brain was performed and compared to the CT examination of 04/18/2018.  FINDINGS: The prior CT examination demonstrated a hygroma overlying the lateral and superolateral aspect of the right frontal lobe. There is now an area of subdural hemorrhage overlying the right frontal and parietal lobe superolaterally. This measures approximately 11 cm in AP dimension. It measures approximately 3 cm in maximum thickness as measured perpendicular to the inner table on the coronal reconstructions and approximately 5 cm in craniocaudal dimension as measured perpendicular to the inner table on the coronal reconstructions. There is approximately 5 mm of midline shift to the left which is new. There is no evidence of intra-axial hemorrhage or of acute infarction. Bone windows show no evidence of calvarial fracture.      Ovoid relatively well demarcated area of subdural hemorrhage overlying the right frontoparietal region measuring approximately 11 x 3 x 5 mm in size with 5 mm of midline shift to the left.  The above information was called to and discussed with Fracisco Rubio.    Radiation dose reduction techniques were utilized, including automated exposure control and exposure modulation based on body size.  This report was finalized on 8/20/2020 4:16 PM by   Naseem Richardson M.D.      Ct Chest With Contrast    Result Date: 8/20/2020  CT CHEST, ABDOMEN, AND PELVIS WITH IV CONTRAST  HISTORY: Fall, right rib trauma  TECHNIQUE: Radiation dose reduction techniques were utilized, including automated exposure control and exposure modulation based on body size. 3 mm images were obtained through the chest, abdomen, and pelvis with IV contrast.  COMPARISON: None  FINDINGS:  Chest:    Small hiatal hernia is present. Mildly enlarged subcarinal node measures 1 cm in short axis dimension. There is no noncalcified hilar or axillary adenopathy by size criteria.  Bilateral gynecomastia is present. Heart is mild to moderately enlarged. There is moderate to extensive coronary calcification. No significant pericardial effusion is present.  There is no pulmonary consolidation, pleural effusion or pneumothorax. No suspicious pulmonary nodules are visualized.  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  There are no findings of small bowel obstruction. The appendix is unremarkable.  The liver has a heterogenous enhancement pattern. Ill-defined nonmasslike peripheral hyperenhancement within the inferior aspect of the left hepatic lobe is likely perfusional. There is discrete hepatic portal venous shunt extending through the right hepatic lobe. The gallbladder, and spleen have an unremarkable postcontrast CT appearance.  The main pancreatic duct is mildly distended, measuring approximately 0.5 cm in diameter.  Subcentimeter hypodense renal lesions are too small to characterize. Larger cystic density lesions within the right kidney likely represent simple cysts. Indeterminate density lesions within the posterior aspect of the midpole bilateral kidneys measures 1.2 cm on the right and 1.9 cm on the left. There is no hydronephrosis. Symmetric stranding is present along the bilateral renal collecting systems.  Borderline enlarged tara hepatic node measures 1 cm in short axis  dimension and is likely reactive.  There is asymmetric swelling and stranding overlying the left gluteal musculature. Irregular tubular contrast density extends through this area.  Colonic diverticulosis is present. Asymmetric free fluid is present along the inferior aspect of the right paracolic gutter and abuts the sigmoid colon. There is no free intraperitoneal air..  The bladder is moderate to severely distended.  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 There is a fracture extending through the syndesmophyte along the anterior aspect of the T10 vertebral body with a fracture plane extending through the T10 vertebral body and T9-10 disc space posteriorly.  Cortical irregularity is present within the right anterior fourth through ninth ribs and left third through seventh ribs without a visualized fracture plane.  Cortical irregularity along the anterior aspect of the distal sacrum at S4 as well as the coccyx more inferiorly are present without discrete fracture planes visualized.      Impression: 1.  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 Further evaluation with MRI of the thoracic and lumbar spine is recommended to determine the stability of this fracture as well as evaluate for any occult injury given the ankylosis. 2.  Cortical irregularity of multiple bilateral ribs without discrete fracture planes representing fractures of indeterminate age and correlation with point tenderness is recommended. There is no pneumothorax or findings of pulmonary contusion. 3.  Intramuscular hematoma involving the left gluteal musculature with findings of contrast extravasation in this area. While findings are favored be venous in origin, underlying arterial injury cannot be excluded and continued close attention on follow-up of this area is recommended. Findings are further  evaluated with CTA if clinically indicated. 4.  Fractures of indeterminate age involving the lower sacrum as detailed above. Findings can be further evaluated with MRI if clinically indicated. 5.  Small amount of free fluid is present within the right lower quadrant and abuts the sigmoid colon. While the colon has an otherwise unremarkable appearance, given the history of trauma, underlying bowel injury cannot be excluded and correlation with serial abdominal exams recommended to exclude this possibility. 6.  Indeterminate bilateral renal lesions. Follow-up with CT or ultrasound of the kidneys and 6 months is recommended to ensure stability. 7.  Other findings as above.  The above findings were discussed with Fracisco Rubio by telephone by Darrel Pool at 2:20 PM on 08/20/2020   .  This report was finalized on 8/20/2020 2:22 PM by Dr. Darrel Pool M.D.      Mri Brain With & Without Contrast    Result Date: 8/23/2020  MRI BRAIN WITH AND WITHOUT CONTRAST-  08/23/2020  HISTORY: Known subdural hematoma. Speech difficulty. Possible stroke.  TECHNIQUE: Multiple pre and postcontrast sagittal, axial and coronal images were obtained through the brain.  COMPARISON: There are no previous MRI studies available for review.  FINDINGS:  Again seen is a large mixed signal intensity hematoma in the right frontoparietal region measuring up to 10.9 cm x 2.6 cm in transverse dimensions x approximately 4.8 cm in oblique craniocaudal dimension. A drainage catheter is present within the subdural hematoma. There is approximately 5 mm midline shift to the left. There appears to be minimal subarachnoid hemorrhage in the right superior frontal region best seen on FLAIR series 8 images 21-24.  The diffusion-weighted images show no evidence of acute infarction. There is some prominent dural enhancement of the right cerebral hemisphere following gadolinium. No other enhancing lesions are seen.  The craniocervical junction and sella appear normal.       1. No evidence of acute infarction. 2. Large mixed signal intensity right subdural hematoma with slight midline shift to the left. This has been seen on previous CT scans. Tiny amount of subarachnoid hemorrhage is seen in the right cerebral hemisphere as well. 3. These findings were discussed with Dr. Cardenas.   This report was finalized on 8/23/2020 7:47 PM by Dr. Parker Spicer M.D.      Ct Abdomen Pelvis With Contrast    Result Date: 8/20/2020  CT CHEST, ABDOMEN, AND PELVIS WITH IV CONTRAST  HISTORY: Fall, right rib trauma  TECHNIQUE: Radiation dose reduction techniques were utilized, including automated exposure control and exposure modulation based on body size. 3 mm images were obtained through the chest, abdomen, and pelvis with IV contrast.  COMPARISON: None  FINDINGS:  Chest:    Small hiatal hernia is present. Mildly enlarged subcarinal node measures 1 cm in short axis dimension. There is no noncalcified hilar or axillary adenopathy by size criteria.  Bilateral gynecomastia is present. Heart is mild to moderately enlarged. There is moderate to extensive coronary calcification. No significant pericardial effusion is present.  There is no pulmonary consolidation, pleural effusion or pneumothorax. No suspicious pulmonary nodules are visualized.  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  There are no findings of small bowel obstruction. The appendix is unremarkable.  The liver has a heterogenous enhancement pattern. Ill-defined nonmasslike peripheral hyperenhancement within the inferior aspect of the left hepatic lobe is likely perfusional. There is discrete hepatic portal venous shunt extending through the right hepatic lobe. The gallbladder, and spleen have an unremarkable postcontrast CT appearance.  The main pancreatic duct is mildly distended, measuring approximately 0.5 cm in diameter.  Subcentimeter hypodense renal lesions are too small to characterize. Larger cystic density  lesions within the right kidney likely represent simple cysts. Indeterminate density lesions within the posterior aspect of the midpole bilateral kidneys measures 1.2 cm on the right and 1.9 cm on the left. There is no hydronephrosis. Symmetric stranding is present along the bilateral renal collecting systems.  Borderline enlarged tara hepatic node measures 1 cm in short axis dimension and is likely reactive.  There is asymmetric swelling and stranding overlying the left gluteal musculature. Irregular tubular contrast density extends through this area.  Colonic diverticulosis is present. Asymmetric free fluid is present along the inferior aspect of the right paracolic gutter and abuts the sigmoid colon. There is no free intraperitoneal air..  The bladder is moderate to severely distended.  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 There is a fracture extending through the syndesmophyte along the anterior aspect of the T10 vertebral body with a fracture plane extending through the T10 vertebral body and T9-10 disc space posteriorly.  Cortical irregularity is present within the right anterior fourth through ninth ribs and left third through seventh ribs without a visualized fracture plane.  Cortical irregularity along the anterior aspect of the distal sacrum at S4 as well as the coccyx more inferiorly are present without discrete fracture planes visualized.      Impression: 1.  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 Further evaluation with MRI of the thoracic and lumbar spine is recommended to determine the stability of this fracture as well as evaluate for any occult injury given the ankylosis. 2.  Cortical irregularity of multiple bilateral ribs without discrete fracture planes representing fractures of indeterminate age and correlation with point tenderness is  recommended. There is no pneumothorax or findings of pulmonary contusion. 3.  Intramuscular hematoma involving the left gluteal musculature with findings of contrast extravasation in this area. While findings are favored be venous in origin, underlying arterial injury cannot be excluded and continued close attention on follow-up of this area is recommended. Findings are further evaluated with CTA if clinically indicated. 4.  Fractures of indeterminate age involving the lower sacrum as detailed above. Findings can be further evaluated with MRI if clinically indicated. 5.  Small amount of free fluid is present within the right lower quadrant and abuts the sigmoid colon. While the colon has an otherwise unremarkable appearance, given the history of trauma, underlying bowel injury cannot be excluded and correlation with serial abdominal exams recommended to exclude this possibility. 6.  Indeterminate bilateral renal lesions. Follow-up with CT or ultrasound of the kidneys and 6 months is recommended to ensure stability. 7.  Other findings as above.  The above findings were discussed with Fracisco Rubio by telephone by Darrel Pool at 2:20 PM on 08/20/2020   .  This report was finalized on 8/20/2020 2:22 PM by Dr. Darrel Pool M.D.      Xr Abdomen Kub    Result Date: 8/25/2020  KUB  HISTORY: Feeding tube placement.  FINDINGS: A single view of the upper abdomen demonstrates a feeding tube in place with the tip at the gastroesophageal junction. This needs to be advanced. The above information was called to the patient's nurse.  This report was finalized on 8/25/2020 2:18 PM by Dr. Naseem Richardson M.D.      Results for orders placed during the hospital encounter of 04/17/17   Adult Transthoracic Echo Complete With Contrast    Narrative · Left ventricular function is normal. Estimated EF = 55%.  · Right ventricular cavity is mildly dilated.  · Mildly reduced right ventricular systolic function noted.  · Left atrial cavity size  is moderate-to-severely dilated.  · Mild aortic valve regurgitation is present.  · Mild-to-moderate mitral valve regurgitation is present  · Mild aortic valve stenosis is present.              Active Hospital Problems    Diagnosis  POA   • Head injury due to trauma [S09.90XA]  Unknown   • Traumatic subdural hematoma without loss of consciousness (CMS/HCC) [S06.5X0A]  Unknown   • Coagulopathy (CMS/HCC) [D68.9]  Unknown   • SDH (subdural hematoma) (CMS/HCC) [S06.5X9A]  Yes      Resolved Hospital Problems   No resolved problems to display.         Assessment/Plan     1. Traumatic right subdural hematoma with brain compression and without loss of consciousness.  Patient is status post craniotomy for decompression.  Neurosurgery following at this time from their standpoint he could transfer to rehab when other problems are stable  2. Seizure disorder no new seizures have been noted because of his significant lethargy and no seizures decreasing the neuroleptics has apparently helped wake him up some and he has not had any seizure activity will continue to monitor, neurology's help appreciated  3. Coagulopathy on admission secondary to Eliquis  4. Vertebral compression fractures he is having some pain continue the Lidoderm patch because he is just starting to finally wake up I am hesitant to give him significant narcotics.  5. Left gluteal intramuscular hematoma  6. Lower sacral fracture  7. Indeterminate bilateral kidney lesions these will need to follow-up  in a few months.  8. Atrial fibrillation had been on Eliquis now being held secondary to-fall and brain bleed  9. Chronic CHF on chronic therapy   10. hypothyroidism on replacement  11. Hypertension blood pressure appears to be adequately controlled  12. Diabetes mellitus type 2 but sugars appear to be adequately controlled  13. Dementia  14. Bladder carcinoma  15. Urinary retention we will add some Flomax straight cath PRN  16. Anemia mild hemoglobin actually  improving  17. Oral pharyngeal dysphagia apparently he did somewhat better today and speech therapy is recommended trying purée and nectar thick liquids they are going to repeat a video swallow study tomorrow so I guess feeding tube is on hold.  18. Pain patient has lots of reasons for pain patient has been asking for several days to be kept comfortable.  The fentanyl has started to work although it does drop his pressure some as does some of the PRN medications.  Family has finally decided to pursue a palliative course and honor his wish and keep him comfortable.  I suspect he will not do really well he is not eating much now not really drinking and I suspect when we start giving him a little more pain medication to relieve his pain he is going to see his blood pressure dropped quickly.  19. Full palliative care allow natural death transfer to [facility] palliative unit    Plan for disposition:     Ajay Harry MD  09/04/20  12:46    Time: I spent well over 45 minutes on patient's care today

## 2020-09-04 NOTE — SIGNIFICANT NOTE
09/04/20 1338   Rehab Treatment   Discipline physical therapist   Reason Treatment Not Performed patient/family declined treatment  (patient declined PT today, states he does not wish to get up to chair or sit on EOB. IRENE Adan in room and aware. Family currently discussing goals of care.)   Recommendation   PT - Next Appointment 09/08/20

## 2020-09-04 NOTE — SIGNIFICANT NOTE
09/04/20 0934   Rehab Treatment   Discipline occupational therapist   Reason Treatment Not Performed patient/family declined treatment  (family deciding on palliative, RN states OK to ask pt.  Pt refuses.  Did reposition pt to comfort with nurse assist.)   Recommendation   OT - Next Appointment 09/08/20

## 2020-09-04 NOTE — NURSING NOTE
Patients blood sugar dropped to 24 last night. IV Dextrose given and blood sugar came up to 172. Blood sugar this morning was 68. Patient was given an orange juice cup. Blood sugar came up to 72.

## 2020-09-04 NOTE — PLAN OF CARE
Problem: Patient Care Overview  Goal: Plan of Care Review  Outcome: Ongoing (interventions implemented as appropriate)  Flowsheets (Taken 9/4/2020 0429)  Progress: no change  Plan of Care Reviewed With: patient  Outcome Summary: VSS. Patients pain better controlled with IV Dilaudid overnight. Patient able to sleep most of the night. Parks care completed. Labs for the morning. Will continue to monitor.     Problem: Fall Risk (Adult)  Goal: Absence of Fall  Outcome: Ongoing (interventions implemented as appropriate)     Problem: Skin Injury Risk (Adult)  Goal: Skin Health and Integrity  Outcome: Ongoing (interventions implemented as appropriate)     Problem: Seizure Disorder/Epilepsy (Adult)  Goal: Signs and Symptoms of Listed Potential Problems Will be Absent, Minimized or Managed (Seizure Disorder/Epilepsy)  Outcome: Ongoing (interventions implemented as appropriate)

## 2020-09-05 NOTE — PLAN OF CARE
Problem: Patient Care Overview  Goal: Plan of Care Review  Outcome: Ongoing (interventions implemented as appropriate)  Flowsheets  Taken 9/5/2020 0051  Plan of Care Reviewed With: patient  Taken 9/5/2020 5168  Outcome Summary: Palliative orders released. VVS at beginning of shift stable but now no longer checking. Best to make patient comfortable. Pain seems to be managed at the moment, patient able to sleep for duration of shift. Daughter was able to be at bedside yesterday evening. No new orders at this time, awaiting for patient to transfer to Palliative unit. Continue to monitor and make patient safe and comfortable.

## 2020-09-05 NOTE — PLAN OF CARE
Problem: Patient Care Overview  Goal: Plan of Care Review  Outcome: Ongoing (interventions implemented as appropriate)  Flowsheets (Taken 9/5/2020 1702)  Progress: declining  Plan of Care Reviewed With: patient; family  Outcome Summary: Pt transferred to [facility] for palliative care. Lidoderm and Fent patches continue. Morphine 2mg given prn for right flank pain. Pt had many family visitors today. Will continue comfort care.

## 2020-09-06 NOTE — PROGRESS NOTES
LOS: 17 days   Patient Care Team:  Jesus Saez MD as PCP - General (Internal Medicine)  Casper Torres MD as PCP - Claims Attributed    Subjective     Follow-up palliative care    Looks more comfortable today.  Is not eating per his family.  He did not report pain for me.  He is more somnolent.    Review of Systems:         Objective     Vital Signs  Vital Sign Min/Max for last 24 hours  Temp  Min: 97.3 °F (36.3 °C)  Max: 97.3 °F (36.3 °C)   BP  Min: 98/58  Max: 98/58   Pulse  Min: 92  Max: 92   Resp  Min: 16  Max: 16   SpO2  Min: 94 %  Max: 94 %   No data recorded   No data recorded        Ventilator/Non-Invasive Ventilation Settings (From admission, onward)    None              Arterial Line BP: 113/55  Arterial Line MAP (mmHg): 74 mmHg     Body mass index is 26.7 kg/m².  I/O last 3 completed shifts:  In: 0   Out: 750 [Urine:750]  No intake/output data recorded.        Physical Exam:  Looks comfortable and not in acute distress    Labs:  Results from last 7 days   Lab Units 09/04/20  0643 09/03/20  0501 09/02/20  0457 09/01/20  0536 08/31/20  0606   GLUCOSE mg/dL 67 80 88 141* 74   SODIUM mmol/L 137 138 139 144 145   POTASSIUM mmol/L 3.8 4.1 3.6 4.2 3.4*   CO2 mmol/L 20.3* 25.1 27.5 28.8 27.9   CHLORIDE mmol/L 106 105 104 108* 107   ANION GAP mmol/L 10.7 7.9 7.5 7.2 10.1   CREATININE mg/dL 0.45* 0.58* 0.54* 0.60* 0.59*   BUN mg/dL 12 13 15 18 24*   BUN / CREAT RATIO  26.7* 22.4 27.8* 30.0* 40.7*   CALCIUM mg/dL 7.8* 8.6 8.5 8.9 9.1   EGFR IF NONAFRICN AM mL/min/1.73 >150 131 143 126 129     Estimated Creatinine Clearance: 60.1 mL/min (A) (by C-G formula based on SCr of 0.45 mg/dL (L)).      Results from last 7 days   Lab Units 09/04/20  0643 09/03/20  0707 09/02/20  0457 09/01/20  0536 08/31/20  0606   WBC 10*3/mm3 11.42* 12.96* 13.38* 9.65 8.13   RBC 10*6/mm3 3.02* 2.58* 3.24* 3.13* 3.18*   HEMOGLOBIN g/dL 9.9* 8.6* 10.8* 10.2* 10.6*   HEMATOCRIT % 32.2* 26.2* 32.7* 31.0* 32.0*   MCV fL  106.6* 101.6* 100.9* 99.0* 100.6*   MCH pg 32.8 33.3* 33.3* 32.6 33.3*   MCHC g/dL 30.7* 32.8 33.0 32.9 33.1   RDW % 13.7 13.9 14.0 14.0 13.6   RDW-SD fl 53.8 51.2 51.7 49.9 49.4   MPV fL 10.3 10.0 10.6 10.3 10.5   PLATELETS 10*3/mm3 238 208 213 235 236   NEUTROPHIL % %  --  78.2* 74.3 63.4 64.4   LYMPHOCYTE % %  --  8.1* 10.5* 15.0* 14.0*   MONOCYTES % %  --  9.0 10.2 14.0* 12.9*   EOSINOPHIL % %  --  3.1 3.1 5.7 6.6*   BASOPHIL % %  --  0.4 0.6 0.6 0.6   IMM GRAN % %  --  1.2* 1.3* 1.3* 1.5*   NEUTROS ABS 10*3/mm3 10.93* 10.15* 9.94* 6.11 5.23   LYMPHS ABS 10*3/mm3  --  1.05 1.41 1.45 1.14   MONOS ABS 10*3/mm3  --  1.16* 1.37* 1.35* 1.05*   EOS ABS 10*3/mm3 0.25 0.40 0.41* 0.55* 0.54*   BASOS ABS 10*3/mm3  --  0.05 0.08 0.06 0.05   IMMATURE GRANS (ABS) 10*3/mm3  --  0.15* 0.17* 0.13* 0.12*   NRBC /100 WBC  --  0.2 0.1 0.2 0.4*                             Microbiology Results (last 10 days)     ** No results found for the last 240 hours. **              fentaNYL 1 patch Transdermal Q72H   And      Check Fentanyl Patch Placement 1 each Does not apply Q12H   lidocaine 3 patch Transdermal Q24H   sodium chloride 10 mL Intravenous Q12H         Active Hospital Problems    Diagnosis  POA   • Head injury due to trauma [S09.90XA]  Unknown   • Traumatic subdural hematoma without loss of consciousness (CMS/HCC) [S06.5X0A]  Unknown   • Coagulopathy (CMS/HCC) [D68.9]  Unknown   • SDH (subdural hematoma) (CMS/HCC) [S06.5X9A]  Yes      Resolved Hospital Problems   No resolved problems to display.         Assessment/Plan     1. Traumatic right subdural hematoma with brain compression and without loss of consciousness.  Patient is status post craniotomy for decompression.    2. Seizure disorder   3. Coagulopathy on admission secondary to Eliquis  4. Vertebral compression fractures   5. Left gluteal intramuscular hematoma  6. Lower sacral fracture  7. Indeterminate bilateral kidney lesions  8. Atrial fibrillation   9. Chronic CHF      10. hypothyroidism   11. Hypertension  12. Diabetes mellitus type 2  13. Dementia  14. Bladder carcinoma  15. Urinary retention  16. Anemia  17. Oral pharyngeal dysphagia   18. Pain     Plan:  Continue palliative care.  Discussed with family at bedside and answered their questions to their satisfaction.        Keri Le MD  09/06/20  17:32

## 2020-09-06 NOTE — PLAN OF CARE
Problem: Patient Care Overview  Goal: Plan of Care Review  Outcome: Ongoing (interventions implemented as appropriate)  Flowsheets (Taken 9/6/2020 1723)  Progress: declining  Plan of Care Reviewed With: patient; family  Outcome Summary: Morphine given as needed for pain and SOA. Premedicate prior to turns d/t pain with turns. Ativan given x1 for c/o restlessness. Pt able to make needs known. Family at bsd, assisting with pt. Pt not eating, taking in only ice chips. Will continue comfort care.

## 2020-09-06 NOTE — PROGRESS NOTES
LOS: 16 days   Patient Care Team:  Jesus Saez MD as PCP - General (Internal Medicine)  Casper Torres MD as PCP - Claims Attributed    Subjective     Follow-up palliative care    Reported back pain.  His voice is hoarse.    Review of Systems:         Objective     Vital Signs  Vital Sign Min/Max for last 24 hours  Temp  Min: 97.7 °F (36.5 °C)  Max: 98.6 °F (37 °C)   BP  Min: 102/72  Max: 137/68   Pulse  Min: 93  Max: 95   Resp  Min: 20  Max: 20   SpO2  Min: 93 %  Max: 95 %   No data recorded   Weight  Min: 70.6 kg (155 lb 9.6 oz)  Max: 70.6 kg (155 lb 9.6 oz)        Ventilator/Non-Invasive Ventilation Settings (From admission, onward)    None              Arterial Line BP: 113/55  Arterial Line MAP (mmHg): 74 mmHg     Body mass index is 26.7 kg/m².  I/O last 3 completed shifts:  In: 0   Out: 850 [Urine:850]  No intake/output data recorded.        Physical Exam:  Looks comfortable and not in acute distress    Labs:  Results from last 7 days   Lab Units 09/04/20  0643 09/03/20  0501 09/02/20  0457 09/01/20  0536 08/31/20  0606 08/30/20  0718   GLUCOSE mg/dL 67 80 88 141* 74 111*   SODIUM mmol/L 137 138 139 144 145 143   POTASSIUM mmol/L 3.8 4.1 3.6 4.2 3.4* 3.6   CO2 mmol/L 20.3* 25.1 27.5 28.8 27.9 28.6   CHLORIDE mmol/L 106 105 104 108* 107 104   ANION GAP mmol/L 10.7 7.9 7.5 7.2 10.1 10.4   CREATININE mg/dL 0.45* 0.58* 0.54* 0.60* 0.59* 0.53*   BUN mg/dL 12 13 15 18 24* 26*   BUN / CREAT RATIO  26.7* 22.4 27.8* 30.0* 40.7* 49.1*   CALCIUM mg/dL 7.8* 8.6 8.5 8.9 9.1 9.0   EGFR IF NONAFRICN AM mL/min/1.73 >150 131 143 126 129 146     Estimated Creatinine Clearance: 60.1 mL/min (A) (by C-G formula based on SCr of 0.45 mg/dL (L)).      Results from last 7 days   Lab Units 09/04/20  0643 09/03/20  0707 09/02/20  0457 09/01/20  0536 08/31/20  0606 08/30/20  0718   WBC 10*3/mm3 11.42* 12.96* 13.38* 9.65 8.13 9.73   RBC 10*6/mm3 3.02* 2.58* 3.24* 3.13* 3.18* 3.14*   HEMOGLOBIN g/dL 9.9* 8.6* 10.8*  10.2* 10.6* 10.5*   HEMATOCRIT % 32.2* 26.2* 32.7* 31.0* 32.0* 31.2*   MCV fL 106.6* 101.6* 100.9* 99.0* 100.6* 99.4*   MCH pg 32.8 33.3* 33.3* 32.6 33.3* 33.4*   MCHC g/dL 30.7* 32.8 33.0 32.9 33.1 33.7   RDW % 13.7 13.9 14.0 14.0 13.6 13.7   RDW-SD fl 53.8 51.2 51.7 49.9 49.4 47.7   MPV fL 10.3 10.0 10.6 10.3 10.5 10.9   PLATELETS 10*3/mm3 238 208 213 235 236 218   NEUTROPHIL % %  --  78.2* 74.3 63.4 64.4 69.7   LYMPHOCYTE % %  --  8.1* 10.5* 15.0* 14.0* 11.9*   MONOCYTES % %  --  9.0 10.2 14.0* 12.9* 12.6*   EOSINOPHIL % %  --  3.1 3.1 5.7 6.6* 3.5   BASOPHIL % %  --  0.4 0.6 0.6 0.6 0.7   IMM GRAN % %  --  1.2* 1.3* 1.3* 1.5* 1.6*   NEUTROS ABS 10*3/mm3 10.93* 10.15* 9.94* 6.11 5.23 6.77   LYMPHS ABS 10*3/mm3  --  1.05 1.41 1.45 1.14 1.16   MONOS ABS 10*3/mm3  --  1.16* 1.37* 1.35* 1.05* 1.23*   EOS ABS 10*3/mm3 0.25 0.40 0.41* 0.55* 0.54* 0.34   BASOS ABS 10*3/mm3  --  0.05 0.08 0.06 0.05 0.07   IMMATURE GRANS (ABS) 10*3/mm3  --  0.15* 0.17* 0.13* 0.12* 0.16*   NRBC /100 WBC  --  0.2 0.1 0.2 0.4* 0.2                             Microbiology Results (last 10 days)     ** No results found for the last 240 hours. **              fentaNYL 1 patch Transdermal Q72H   And      Check Fentanyl Patch Placement 1 each Does not apply Q12H   lidocaine 3 patch Transdermal Q24H   sodium chloride 10 mL Intravenous Q12H         Active Hospital Problems    Diagnosis  POA   • Head injury due to trauma [S09.90XA]  Unknown   • Traumatic subdural hematoma without loss of consciousness (CMS/HCC) [S06.5X0A]  Unknown   • Coagulopathy (CMS/HCC) [D68.9]  Unknown   • SDH (subdural hematoma) (CMS/HCC) [S06.5X9A]  Yes      Resolved Hospital Problems   No resolved problems to display.         Assessment/Plan     1. Traumatic right subdural hematoma with brain compression and without loss of consciousness.  Patient is status post craniotomy for decompression.    2. Seizure disorder   3. Coagulopathy on admission secondary to  Eliquis  4. Vertebral compression fractures   5. Left gluteal intramuscular hematoma  6. Lower sacral fracture  7. Indeterminate bilateral kidney lesions  8. Atrial fibrillation   9. Chronic CHF   10. hypothyroidism   11. Hypertension  12. Diabetes mellitus type 2  13. Dementia  14. Bladder carcinoma  15. Urinary retention  16. Anemia  17. Oral pharyngeal dysphagia   18. Pain     Plan:  Full palliative care allow natural death transfer to [facility] palliative unit.  Continue pain medications and anxiety medications        Keri Le MD  09/05/20  20:53

## 2020-09-06 NOTE — PLAN OF CARE
Problem: Patient Care Overview  Goal: Plan of Care Review  Outcome: Ongoing (interventions implemented as appropriate)  Flowsheets  Taken 9/5/2020 1702 by Nikhil Kim, RN  Progress: declining  Taken 9/6/2020 0504 by Fei Real RN  Plan of Care Reviewed With: patient;spouse  Outcome Summary: Morphine increased to 4 mg Q 4hours, pt slept well, lidoderm patches removed, Fentanyl patch on right arm intact, turn Q 4hours, will continue comfort care

## 2020-09-07 NOTE — PLAN OF CARE
Problem: Patient Care Overview  Goal: Plan of Care Review  Outcome: Ongoing (interventions implemented as appropriate)  Flowsheets (Taken 9/7/2020 0400)  Progress: no change  Plan of Care Reviewed With: patient  Outcome Summary: Medicated before turns. Patients daughter hesitant to use ativan because of family visits, but ok with giving morphine. Patient and daughter educated on use of medicated. Rested between care. Continue to offer comfort measures

## 2020-09-07 NOTE — PROGRESS NOTES
LOS: 18 days   Patient Care Team:  Jesus Saez MD as PCP - General (Internal Medicine)  Casper Torres MD as PCP - Claims Attributed    Subjective     Follow-up palliative care    More somnolent and less responsive.  Had only ice chips today and did not eat.  Received last dose of morphine at 1230 and when I saw him around 4:45 PM he was pretty lethargic.    Review of Systems:         Objective     Vital Signs  Vital Sign Min/Max for last 24 hours  Temp  Min: 97.4 °F (36.3 °C)  Max: 98.8 °F (37.1 °C)   BP  Min: 89/60  Max: 100/64   Pulse  Min: 94  Max: 116   Resp  Min: 18  Max: 20   SpO2  Min: 96 %  Max: 97 %   No data recorded   No data recorded        Ventilator/Non-Invasive Ventilation Settings (From admission, onward)    None              Arterial Line BP: 113/55  Arterial Line MAP (mmHg): 74 mmHg     Body mass index is 26.7 kg/m².  I/O last 3 completed shifts:  In: 0   Out: 1000 [Urine:1000]  I/O this shift:  In: 0   Out: 125 [Urine:125]        Physical Exam:  Looks comfortable and not in acute distress    Labs:  Results from last 7 days   Lab Units 09/04/20  0643 09/03/20  0501 09/02/20  0457 09/01/20  0536   GLUCOSE mg/dL 67 80 88 141*   SODIUM mmol/L 137 138 139 144   POTASSIUM mmol/L 3.8 4.1 3.6 4.2   CO2 mmol/L 20.3* 25.1 27.5 28.8   CHLORIDE mmol/L 106 105 104 108*   ANION GAP mmol/L 10.7 7.9 7.5 7.2   CREATININE mg/dL 0.45* 0.58* 0.54* 0.60*   BUN mg/dL 12 13 15 18   BUN / CREAT RATIO  26.7* 22.4 27.8* 30.0*   CALCIUM mg/dL 7.8* 8.6 8.5 8.9   EGFR IF NONAFRICN AM mL/min/1.73 >150 131 143 126     Estimated Creatinine Clearance: 60.1 mL/min (A) (by C-G formula based on SCr of 0.45 mg/dL (L)).      Results from last 7 days   Lab Units 09/04/20  0643 09/03/20  0707 09/02/20  0457 09/01/20  0536   WBC 10*3/mm3 11.42* 12.96* 13.38* 9.65   RBC 10*6/mm3 3.02* 2.58* 3.24* 3.13*   HEMOGLOBIN g/dL 9.9* 8.6* 10.8* 10.2*   HEMATOCRIT % 32.2* 26.2* 32.7* 31.0*   MCV fL 106.6* 101.6* 100.9*  99.0*   MCH pg 32.8 33.3* 33.3* 32.6   MCHC g/dL 30.7* 32.8 33.0 32.9   RDW % 13.7 13.9 14.0 14.0   RDW-SD fl 53.8 51.2 51.7 49.9   MPV fL 10.3 10.0 10.6 10.3   PLATELETS 10*3/mm3 238 208 213 235   NEUTROPHIL % %  --  78.2* 74.3 63.4   LYMPHOCYTE % %  --  8.1* 10.5* 15.0*   MONOCYTES % %  --  9.0 10.2 14.0*   EOSINOPHIL % %  --  3.1 3.1 5.7   BASOPHIL % %  --  0.4 0.6 0.6   IMM GRAN % %  --  1.2* 1.3* 1.3*   NEUTROS ABS 10*3/mm3 10.93* 10.15* 9.94* 6.11   LYMPHS ABS 10*3/mm3  --  1.05 1.41 1.45   MONOS ABS 10*3/mm3  --  1.16* 1.37* 1.35*   EOS ABS 10*3/mm3 0.25 0.40 0.41* 0.55*   BASOS ABS 10*3/mm3  --  0.05 0.08 0.06   IMMATURE GRANS (ABS) 10*3/mm3  --  0.15* 0.17* 0.13*   NRBC /100 WBC  --  0.2 0.1 0.2                             Microbiology Results (last 10 days)     ** No results found for the last 240 hours. **              fentaNYL 1 patch Transdermal Q72H   And      Check Fentanyl Patch Placement 1 each Does not apply Q12H   lidocaine 3 patch Transdermal Q24H   sodium chloride 10 mL Intravenous Q12H         Active Hospital Problems    Diagnosis  POA   • Head injury due to trauma [S09.90XA]  Unknown   • Traumatic subdural hematoma without loss of consciousness (CMS/HCC) [S06.5X0A]  Unknown   • Coagulopathy (CMS/HCC) [D68.9]  Unknown   • SDH (subdural hematoma) (CMS/HCC) [S06.5X9A]  Yes      Resolved Hospital Problems   No resolved problems to display.         Assessment/Plan     1. Traumatic right subdural hematoma with brain compression and without loss of consciousness.  Patient is status post craniotomy for decompression.    2. Seizure disorder   3. Coagulopathy on admission secondary to Eliquis  4. Vertebral compression fractures   5. Left gluteal intramuscular hematoma  6. Lower sacral fracture  7. Indeterminate bilateral kidney lesions  8. Atrial fibrillation   9. Chronic CHF   10. hypothyroidism   11. Hypertension  12. Diabetes mellitus type 2  13. Dementia  14. Bladder carcinoma  15. Urinary  retention  16. Anemia  17. Oral pharyngeal dysphagia   18. Pain     Plan:  Continue palliative care.  Discussed with family at bedside and answered their questions to their satisfaction.        Keri Le MD  09/07/20  17:02

## 2020-09-07 NOTE — CONSULTS
"Pt unresponsive, sleeping restfully.  Wife, daughter, son in attendance @ bedside.  Wife requests \"Our Father\" states that family  previously anointed during visit.  Family quietly grieving and expressing thanks for good  and father.  Prayer of gratitude offered with request for family's mutual support of one another throughout grief.  Prayer ended with \"Our Father\" in unison.  Family aware of continued availability of Chaplaincy.    "

## 2020-09-07 NOTE — PLAN OF CARE
"  Problem: Patient Care Overview  Goal: Plan of Care Review  Flowsheets (Taken 9/7/2020 1738)  Plan of Care Reviewed With: patient; spouse  Outcome Summary: Medicated prior to turns with morphine 4mg. Family hesitant to use ativan or pain medicine because of visits and they do not always see the need for it. Education given on medicaiton and nonverbal s/s of pain that are monitored by staff to establish need for pain medicine, informed spouse throughout the night pt may require higher dose of medication to keep pt comfortable. Spouse voices understanding. Pt rested throughout shift in between multiple visitations by family and friends. Very little ice chips taken PO through shift.     Problem: Patient Care Overview  Goal: Individualization and Mutuality  Flowsheets  Taken 9/7/2020 1738 by Frederick Crews, RN  Patient Specific Goals (Include Timeframe): comfort care  Patient Specific Interventions: q4 turns, medicate prior to care.  Taken 9/5/2020 1702 by Nikhil Kim RN  Patient Specific Preferences: Pt goes by \"Aron.\"     "

## 2020-09-07 NOTE — PROGRESS NOTES
Urology Progress Note    Patient Identification:  Name:  Haris Sweeney  Age:  89 y.o.  Sex:  male  :  1929  MRN:  [id number]    Chief Complaint:  GH retention    History of Present Illness: Haris Sweeney is a 89 y.o. male who presents with urinary retention and GH. He is on palliative care. Becoming less responsive. No fluid intake. Catheter is in place and is not causing any discernable pain or discomfort. He is resting comfortably with family at bedside.    Problem List:  Patient Active Problem List   Diagnosis   • Non-insulin dependent type 2 diabetes mellitus (CMS/HCC)   • Generalized osteoarthritis of multiple sites   • Depression   • A-fib (CMS/HCC)   • Essential hypertension   • Hypothyroidism   • Chronic gout   • GERD (gastroesophageal reflux disease)   • Encounter for prostate cancer screening   • Bilateral carpal tunnel syndrome   • Chronic anticoagulation   • Paresthesia of both hands   • Medicare annual wellness visit, subsequent   • Bilateral hand pain   • Traumatic subarachnoid hemorrhage with loss of consciousness of 30 minutes or less (CMS/HCC)   • Fall at home   • Chronic combined systolic and diastolic congestive heart failure (CMS/HCC)   • Hospital discharge follow-up   • Orthostatic hypotension   • Weakness generalized   • C. difficile colitis   • Skin tear of left forearm without complication   • SDH (subdural hematoma) (CMS/HCC)   • Head injury due to trauma   • Traumatic subdural hematoma without loss of consciousness (CMS/HCC)   • Coagulopathy (CMS/HCC)     Past Medical History:  Past Medical History:   Diagnosis Date   • A-fib (CMS/HCC)    • Arthritis    • Bladder cancer (CMS/HCC)    • CHF (congestive heart failure) (CMS/HCC)    • Coagulopathy (CMS/HCC) 2020   • Depression    • Diabetes mellitus (CMS/HCC)    • GERD (gastroesophageal reflux disease)    • Hypertension    • Kidney stones    • Macular degeneration    • Neuromuscular disorder (CMS/HCC)    • Thyroid goiter       Past Surgical History:  Past Surgical History:   Procedure Laterality Date   • BLADDER SURGERY     • CATARACT EXTRACTION Bilateral    • CATARACT EXTRACTION     • CRANIOTOMY FOR SUBDURAL HEMATOMA Right 8/21/2020    Procedure: Right-sided craniotomy for subdural;  Surgeon: Logan Delgado MD;  [facility] OR;  Service: Neurosurgery;  Laterality: Right;   • THYROIDECTOMY     • TOTAL THYROIDECTOMY       Home Meds:  Medications Prior to Admission   Medication Sig Dispense Refill Last Dose   • allopurinol (ZYLOPRIM) 100 MG tablet Take 1 tablet by mouth 2 (Two) Times a Day. 180 tablet 3 Taking   • apixaban (ELIQUIS) 5 MG tablet tablet Take 1 tablet by mouth Every 12 (Twelve) Hours. 180 tablet 2 Taking   • B Complex Vitamins (VITAMIN B COMPLEX PO) Take  by mouth.   Taking   • citalopram (CeleXA) 20 MG tablet TAKE 1 TABLET BY MOUTH EVERY DAY 90 tablet 1    • colesevelam (WELCHOL) 3.75 g pack pack Take  by mouth Daily.   Taking   • finasteride (PROSCAR) 5 MG tablet TAKE 1 TABLET BY MOUTH EVERY DAY 90 tablet 1    • fluticasone (FLONASE) 50 MCG/ACT nasal spray 2 sprays into the nostril(s) as directed by provider Daily. 3 bottle 3    • furosemide (LASIX) 20 MG tablet Take 1 tablet by mouth Daily. Take 1 tab in the morning then another at 2 pm 180 tablet 2 Taking   • glimepiride (AMARYL) 1 MG tablet TAKE 1 TABLET BY MOUTH EVERY DAY BEFORE BREAKFAST 90 tablet 1    • glucose blood (ONE TOUCH ULTRA TEST) test strip USE AS DIRECTED TO TEST BLOOD SUGAR 1-2 TIMES DAILY. DX E11.9 100 each 3    • latanoprost (XALATAN) 0.005 % ophthalmic solution    Taking   • meloxicam (MOBIC) 15 MG tablet Take 1 tablet by mouth Daily. 90 tablet 3    • ONETOUCH DELICA LANCETS 33G misc USE AS DIRECTED TO TEST BLOOD SUGAR ONCE DAILY. E11.9 100 each 3 Taking   • pantoprazole (PROTONIX) 40 MG EC tablet Take 40 mg by mouth As Needed.   Taking   • potassium chloride (KLOR-CON) 10 MEQ CR tablet Take 1 tablet by mouth Daily. 90 tablet 2 Taking    • Saw Palmetto 450 MG capsule Take  by mouth.   Taking   • SYNTHROID 125 MCG tablet Take 1 tablet by mouth Daily. 90 tablet 3    • timolol (TIMOPTIC) 0.5 % ophthalmic solution Administer 0.5 drops to both eyes Daily  99 Taking     Current Meds:    Current Facility-Administered Medications:   •  acetaminophen (TYLENOL) tablet 650 mg, 650 mg, Oral, Q4H PRN **OR** acetaminophen (TYLENOL) 160 MG/5ML solution 650 mg, 650 mg, Oral, Q4H PRN **OR** acetaminophen (TYLENOL) suppository 650 mg, 650 mg, Rectal, Q4H PRN, Ajay Harry MD  •  acetaminophen (TYLENOL) tablet 650 mg, 650 mg, Oral, Q4H PRN, 650 mg at 09/03/20 0921 **OR** acetaminophen (TYLENOL) suppository 650 mg, 650 mg, Rectal, Q4H PRN, Ajay Harry MD, 650 mg at 08/31/20 0432  •  bisacodyl (DULCOLAX) suppository 10 mg, 10 mg, Rectal, Daily PRN, Ajay Harry MD, 10 mg at 09/02/20 1417  •  fentaNYL (DURAGESIC) 50 MCG/HR patch 1 patch, 1 patch, Transdermal, Q72H, 1 patch at 09/05/20 1219 **AND** Check Fentanyl Patch Placement, 1 each, Does not apply, Q12H, Ajay Harry MD  •  diphenhydrAMINE (BENADRYL) capsule 25 mg, 25 mg, Oral, Q6H PRN **OR** diphenhydrAMINE (BENADRYL) injection 25 mg, 25 mg, Intravenous, Q6H PRN, Ajay Harry MD  •  diphenoxylate-atropine (LOMOTIL) 2.5-0.025 MG per tablet 1 tablet, 1 tablet, Oral, Q2H PRN, Ajay Harry MD  •  Glycerin-Hypromellose- (ARTIFICIAL TEARS) 0.2-0.2-1 % ophthalmic solution solution 1 drop, 1 drop, Both Eyes, Q30 Min PRN, Ajay Harry MD  •  glycopyrrolate PF (ROBINUL) injection 0.2 mg, 0.2 mg, Intravenous, Q2H PRN **OR** glycopyrrolate PF (ROBINUL) injection 0.2 mg, 0.2 mg, Subcutaneous, Q2H PRN **OR** glycopyrrolate PF (ROBINUL) injection 0.4 mg, 0.4 mg, Intravenous, Q2H PRN **OR** glycopyrrolate PF (ROBINUL) injection 0.4 mg, 0.4 mg, Subcutaneous, Q2H PRN, Ajay Harry MD  •  HYDROmorphone (DILAUDID) injection 0.5 mg, 0.5 mg, Intravenous, Q1H PRN  **OR** morphine injection 2 mg, 2 mg, Intravenous, Q1H PRN, 2 mg at 09/05/20 1615 **OR** morphine concentrated solution solution 5 mg, 5 mg, Sublingual, Q1H PRN, Ajay Harry MD  •  HYDROmorphone (DILAUDID) injection 1 mg, 1 mg, Intravenous, Q1H PRN, 1 mg at 09/05/20 0810 **OR** morphine injection 4 mg, 4 mg, Intravenous, Q1H PRN, 4 mg at 09/07/20 1228 **OR** morphine concentrated solution solution 10 mg, 10 mg, Sublingual, Q1H PRN, Ajay Harry MD, 10 mg at 09/05/20 2122  •  HYDROmorphone (DILAUDID) injection 1.5 mg, 1.5 mg, Intravenous, Q1H PRN **OR** Morphine sulfate (PF) injection 6 mg, 6 mg, Intravenous, Q1H PRN **OR** morphine concentrated solution solution 20 mg, 20 mg, Sublingual, Q1H PRN, Ajay Harry MD  •  lidocaine (LIDODERM) 5 % 3 patch, 3 patch, Transdermal, Q24H, Ajay Harry MD, 3 patch at 09/07/20 0818  •  lidocaine (UROJET) jelly, , Urethral, PRN, Ajay Harry MD  •  LORazepam (ATIVAN) injection 0.5 mg, 0.5 mg, Intravenous, Q1H PRN, 0.5 mg at 09/06/20 2039 **OR** LORazepam (ATIVAN) 2 MG/ML concentrated solution 0.5 mg, 0.5 mg, Sublingual, Q1H PRN, Ajay Harry MD  •  LORazepam (ATIVAN) injection 1 mg, 1 mg, Intravenous, Q1H PRN **OR** LORazepam (ATIVAN) 2 MG/ML concentrated solution 1 mg, 1 mg, Sublingual, Q1H PRN, Ajay Harry MD  •  LORazepam (ATIVAN) injection 2 mg, 2 mg, Intravenous, Q1H PRN **OR** LORazepam (ATIVAN) 2 MG/ML concentrated solution 2 mg, 2 mg, Sublingual, Q1H PRN, Ajay Harry MD  •  magic mouthwash oral supsension 5 mL, 5 mL, Swish & Spit, Q4H PRN, Ajay Harry MD  •  ondansetron (ZOFRAN) tablet 4 mg, 4 mg, Oral, Q6H PRN **OR** ondansetron (ZOFRAN) injection 4 mg, 4 mg, Intravenous, Q6H PRN, Ajay Harry MD  •  promethazine (PHENERGAN) tablet 12.5 mg, 12.5 mg, Oral, Q4H PRN **OR** promethazine (PHENERGAN) 6.25 MG/5ML syrup 12.5 mg, 12.5 mg, Oral, Q4H PRN **OR** promethazine (PHENERGAN) suppository  "12.5 mg, 12.5 mg, Rectal, Q4H PRN, Ajay Harry MD  •  promethazine (PHENERGAN) tablet 6.25 mg, 6.25 mg, Oral, Q4H PRN **OR** promethazine (PHENERGAN) 6.25 MG/5ML syrup 6.25 mg, 6.25 mg, Oral, Q4H PRN **OR** promethazine (PHENERGAN) suppository 6.25 mg, 6.25 mg, Rectal, Q4H PRN, Ajay Harry MD  •  Scopolamine (TRANSDERM-SCOP) 1.5 MG/3DAYS patch 1 patch, 1 patch, Transdermal, Q72H PRN, Ajay aHrry MD  •  [COMPLETED] Insert peripheral IV, , , Once **AND** sodium chloride 0.9 % flush 10 mL, 10 mL, Intravenous, PRN, Ajay Harry MD  •  sodium chloride 0.9 % flush 10 mL, 10 mL, Intravenous, Q12H, Ajay Harry MD, 10 mL at 20 0820  •  sodium chloride 0.9 % flush 10 mL, 10 mL, Intravenous, PRN, Ajay Harry MD  Allergies:  Digoxin and related and Keflex [cephalexin]    Review of Systems:  Review of Systems    Objective:  tMax 24 hours:  Temp (24hrs), Av.1 °F (36.7 °C), Min:97.4 °F (36.3 °C), Max:98.8 °F (37.1 °C)    Vital Sign Ranges:  Temp:  [97.4 °F (36.3 °C)-98.8 °F (37.1 °C)] 98.8 °F (37.1 °C)  Heart Rate:  [100-116] 116  Resp:  [18-20] 18  BP: ()/(60-64) 89/60  Intake and Output Last 3 Shifts:  I/O last 3 completed shifts:  In: 0   Out: 1000 [Urine:1000]    Exam:  BP (!) 89/60 (BP Location: Left arm, Patient Position: Lying)   Pulse 116   Temp 98.8 °F (37.1 °C) (Oral)   Resp 18   Ht 162.6 cm (64.02\")   Wt 70.6 kg (155 lb 9.6 oz)   SpO2 97%   BMI 26.70 kg/m²      General Appearance:    Alert, cooperative, in no acute distress   Head:    Normocephalic, without obvious abnormality, atraumatic   Eyes:            Lids and lashes normal, conjunctivae and sclerae normal, no   icterus, no pallor, corneas clear, PERRLA   Ears:    Ears appear intact with no abnormalities noted   Throat:   No oral lesions, no thrush, oral mucosa moist   Neck:   No adenopathy, supple, trachea midline, no thyromegaly, no   carotid bruit, no JVD   Back:     No kyphosis " present, no scoliosis present, no skin lesions,      erythema or scars, no tenderness to percussion or                   palpation,   range of motion normal   Lungs:     Clear to auscultation,respirations regular, even and                  unlabored    Heart:    Regular rhythm and normal rate, normal S1 and S2, no            murmur, no gallop, no rub, no click   Chest Wall:    No abnormalities observed   Abdomen:     Normal bowel sounds, no masses, no organomegaly, soft        non-tender, non-distended, no guarding, no rebound                tenderness   Rectal:     Deferred   Extremities:   Moves all extremities well, no edema, no cyanosis, no             redness   Pulses:   Pulses palpable and equal bilaterally   Skin:   No bleeding, bruising or rash   Lymph nodes:   No palpable adenopathy   Neurologic:   Cranial nerves 2 - 12 grossly intact, sensation intact, DTR       present and equal bilaterally     Male : callaway cath in place.               Data Review:  All labs (24hrs):    Lab Results (last 24 hours)     ** No results found for the last 24 hours. **        Radiology:   Imaging Results (Last 72 Hours)     ** No results found for the last 72 hours. **          Assessment:    Retention GH  Current FC not causing discomfort  Downsizing would likely cause increased agitation  Will leave in place      Amos Mooney, APRN  9/7/2020  13:25

## 2020-09-08 NOTE — PLAN OF CARE
Problem: Patient Care Overview  Goal: Plan of Care Review  Outcome: Ongoing (interventions implemented as appropriate)  Flowsheets (Taken 9/8/2020 5951)  Progress: no change  Plan of Care Reviewed With: patient; spouse  Outcome Summary: Patient is premedicated prior to turns with Morphine 4mg and Ativan 0.5 mg IV. Maintained comfort measures per pallitiave care protocol.Family at bedside. Will continue to monitor vital signs and comfort.

## 2020-09-08 NOTE — PROGRESS NOTES
Continued Stay Note  [facility]     Patient Name: Haris Sweeney  MRN: [id number]  Today's Date: 9/8/2020    Admit Date: 8/20/2020    Discharge Plan     Row Name 09/08/20 1227       Plan    Plan  Palliative patient.  Follow to see if [facility] consult was ordered.     Plan Comments  Palliative patient.  Follow to see if [facility] consult was ordered and follow for needs.  Noted that referrals were made to [facility] and patient has private pay funds.  YANIRA Bautista        Discharge Codes    No documentation.             YANIRA Mccain

## 2020-09-08 NOTE — PROGRESS NOTES
[facility]  Ted Fletcher MD     LOS: 19 days   Patient Care Team:  Jseus Saez MD as PCP - General (Internal Medicine)  Casper Torres MD as PCP - Claims Attributed        Subjective     Interval History:     Patient Complaints: Urinary retention, gross hematuria  History taken from: chart family.  He is now in palliative care.  Not really responding.  Resting comfortably.  2 of his sons are present and answer all questions.  No source of pain.  Tolerating catheter.    Review of Systems:   All systems were reviewed and were negative except for palliative care    Objective     Vital Signs  Temp:  [97.5 °F (36.4 °C)-97.8 °F (36.6 °C)] 97.5 °F (36.4 °C)  Heart Rate:  [] 108  Resp:  [12-20] 12  BP: (72)/(45) 72/45    Physical Exam:     General Appearance:   Lethargic.   Head:    Normocephalic, without obvious abnormality, atraumatic       Ears:    Ears appear intact with no abnormalities noted       Neck:   No adenopathy, supple, trachea midline,        Lungs:     Clear to auscultation,respirations regular, even and                  unlabored       Chest Wall:    No abnormalities observed       Genital/Rectal:    Catheter in place.  Scant urine which is concentrated.   Extremities:   Moves all extremities well, no edema, no cyanosis, no             redness       Skin:   No bleeding, bruising or rash                Results Review:     I reviewed the patient's new clinical results.    No results found for this or any previous visit (from the past 24 hour(s)).    Medication Review:   Current Facility-Administered Medications:   •  acetaminophen (TYLENOL) tablet 650 mg, 650 mg, Oral, Q4H PRN **OR** acetaminophen (TYLENOL) 160 MG/5ML solution 650 mg, 650 mg, Oral, Q4H PRN **OR** acetaminophen (TYLENOL) suppository 650 mg, 650 mg, Rectal, Q4H PRN, Ajay Harry MD  •  acetaminophen (TYLENOL) tablet 650 mg, 650 mg, Oral, Q4H PRN, 650 mg at 09/03/20 0921 **OR** acetaminophen (TYLENOL)  suppository 650 mg, 650 mg, Rectal, Q4H PRN, Ajay Harry MD, 650 mg at 08/31/20 0432  •  bisacodyl (DULCOLAX) suppository 10 mg, 10 mg, Rectal, Daily PRN, Ajay Harry MD, 10 mg at 09/02/20 1417  •  fentaNYL (DURAGESIC) 50 MCG/HR patch 1 patch, 1 patch, Transdermal, Q72H, 1 patch at 09/05/20 1219 **AND** Check Fentanyl Patch Placement, 1 each, Does not apply, Q12H, Ajay Harry MD  •  diphenhydrAMINE (BENADRYL) capsule 25 mg, 25 mg, Oral, Q6H PRN **OR** diphenhydrAMINE (BENADRYL) injection 25 mg, 25 mg, Intravenous, Q6H PRN, Ajay Harry MD  •  diphenoxylate-atropine (LOMOTIL) 2.5-0.025 MG per tablet 1 tablet, 1 tablet, Oral, Q2H PRN, Ajay Harry MD  •  Glycerin-Hypromellose- (ARTIFICIAL TEARS) 0.2-0.2-1 % ophthalmic solution solution 1 drop, 1 drop, Both Eyes, Q30 Min PRN, Ajay Harry MD  •  glycopyrrolate PF (ROBINUL) injection 0.2 mg, 0.2 mg, Intravenous, Q2H PRN **OR** glycopyrrolate PF (ROBINUL) injection 0.2 mg, 0.2 mg, Subcutaneous, Q2H PRN **OR** glycopyrrolate PF (ROBINUL) injection 0.4 mg, 0.4 mg, Intravenous, Q2H PRN **OR** glycopyrrolate PF (ROBINUL) injection 0.4 mg, 0.4 mg, Subcutaneous, Q2H PRN, Ajay Harry MD  •  HYDROmorphone (DILAUDID) injection 0.5 mg, 0.5 mg, Intravenous, Q1H PRN **OR** morphine injection 2 mg, 2 mg, Intravenous, Q1H PRN, 2 mg at 09/05/20 1615 **OR** morphine concentrated solution solution 5 mg, 5 mg, Sublingual, Q1H PRN, Ajay Harry MD  •  HYDROmorphone (DILAUDID) injection 1 mg, 1 mg, Intravenous, Q1H PRN, 1 mg at 09/05/20 0810 **OR** morphine injection 4 mg, 4 mg, Intravenous, Q1H PRN, 4 mg at 09/08/20 0429 **OR** morphine concentrated solution solution 10 mg, 10 mg, Sublingual, Q1H PRN, Ajay Harry MD, 10 mg at 09/05/20 2122  •  HYDROmorphone (DILAUDID) injection 1.5 mg, 1.5 mg, Intravenous, Q1H PRN **OR** Morphine sulfate (PF) injection 6 mg, 6 mg, Intravenous, Q1H PRN **OR** morphine  concentrated solution solution 20 mg, 20 mg, Sublingual, Q1H PRN, Ajay Harry MD  •  lidocaine (LIDODERM) 5 % 3 patch, 3 patch, Transdermal, Q24H, Ajay Harry MD, 3 patch at 09/07/20 0818  •  lidocaine (UROJET) jelly, , Urethral, PRN, Ajay Harry MD  •  LORazepam (ATIVAN) injection 0.5 mg, 0.5 mg, Intravenous, Q1H PRN, 0.5 mg at 09/08/20 0137 **OR** LORazepam (ATIVAN) 2 MG/ML concentrated solution 0.5 mg, 0.5 mg, Sublingual, Q1H PRN, Ajay Harry MD  •  LORazepam (ATIVAN) injection 1 mg, 1 mg, Intravenous, Q1H PRN **OR** LORazepam (ATIVAN) 2 MG/ML concentrated solution 1 mg, 1 mg, Sublingual, Q1H PRN, Ajay Harry MD  •  LORazepam (ATIVAN) injection 2 mg, 2 mg, Intravenous, Q1H PRN **OR** LORazepam (ATIVAN) 2 MG/ML concentrated solution 2 mg, 2 mg, Sublingual, Q1H PRN, Ajay Harry MD  •  magic mouthwash oral supsension 5 mL, 5 mL, Swish & Spit, Q4H PRN, Ajay Harry MD  •  ondansetron (ZOFRAN) tablet 4 mg, 4 mg, Oral, Q6H PRN **OR** ondansetron (ZOFRAN) injection 4 mg, 4 mg, Intravenous, Q6H PRN, Ajay Harry MD  •  promethazine (PHENERGAN) tablet 12.5 mg, 12.5 mg, Oral, Q4H PRN **OR** promethazine (PHENERGAN) 6.25 MG/5ML syrup 12.5 mg, 12.5 mg, Oral, Q4H PRN **OR** promethazine (PHENERGAN) suppository 12.5 mg, 12.5 mg, Rectal, Q4H PRN, Ajay Harry MD  •  promethazine (PHENERGAN) tablet 6.25 mg, 6.25 mg, Oral, Q4H PRN **OR** promethazine (PHENERGAN) 6.25 MG/5ML syrup 6.25 mg, 6.25 mg, Oral, Q4H PRN **OR** promethazine (PHENERGAN) suppository 6.25 mg, 6.25 mg, Rectal, Q4H PRN, Ajay Harry MD  •  Scopolamine (TRANSDERM-SCOP) 1.5 MG/3DAYS patch 1 patch, 1 patch, Transdermal, Q72H PRN, Ajay Harry MD  •  [COMPLETED] Insert peripheral IV, , , Once **AND** sodium chloride 0.9 % flush 10 mL, 10 mL, Intravenous, PRN, Ajay Harry MD  •  sodium chloride 0.9 % flush 10 mL, 10 mL, Intravenous, Q12H, Ajay Harry MD,  10 mL at 09/07/20 2029  •  sodium chloride 0.9 % flush 10 mL, 10 mL, Intravenous, PRN, Ajay Harry MD    Assessment/Plan       SDH (subdural hematoma) (CMS/[facility])    Head injury due to trauma    Traumatic subdural hematoma without loss of consciousness (CMS/HCC)    Coagulopathy (CMS/[facility])      Urinary retention  BPH with obstruction    Plan for disposition:Will be glad to help at any time.  Presently he is tolerating his catheter and resting comfortably.  Discussed all this with his sons.    Ted Fletcher MD  09/08/20  08:18      Time: 15+

## 2020-09-08 NOTE — TELEPHONE ENCOUNTER
Appointment's cancelled as family has decided to honor his wishes. He was moved to Palliative Care. Appointment and CT cancelled.

## 2020-09-09 NOTE — DISCHARGE SUMMARY
Admission date:20  Discharge date: 20    Condition on discharge:     Discharge diagnosis:  1. Traumatic right subdural hematoma with brain compression and without loss of consciousness.  Patient is status post craniotomy for decompression.    2. Seizure disorder   3. Coagulopathy on admission secondary to Eliquis  4. Vertebral compression fractures   5. Left gluteal intramuscular hematoma  6. Lower sacral fracture  7. Indeterminate bilateral kidney lesions  8. Atrial fibrillation   9. Chronic CHF   10. hypothyroidism   11. Hypertension  12. Diabetes mellitus type 2  13. Dementia  14. Bladder carcinoma  15. Urinary retention  16. Anemia  17. Oral pharyngeal dysphagia   18. Pain     HPI per Dr. Doherty:  Patient is a 89-year-old elderly white male with a past medical history significant for A. fib on anticoagulation with Eliquis, congestive heart failure, bladder cancer, hypothyroidism, dementia who reportedly had a mechanical fall earlier this morning and hit his head and came into the ER brought by family due to head injury and anticoagulation along with right-sided chest pain.  Patient denies any prior chest pain, arrhythmia or palpitations.  He did not have any new neurological deficits.  He underwent a CT head chest and abdomen pelvis which showed large right subdural hematoma along with multiple other incidental findings as mentioned below.  He does have some mild hip rib fractures without pulmonary contusions or shortness of breath.  We have been asked to admit the patient to the ICU given large intracranial bleed and need for close ICU monitoring.  Neurosurgery has not recommended reversal of Eliquis but I chose to go ahead and start reversal given that he has significant bleed and have discussed this with the ER KIM Rubio    Course in hospital:  Patient had reversal of anticoagulants and underwent craniotomy. He had significant pain from fractures. He finally decided with his family to  go comfort care and was transferred to [facility] and received palliative medications until he passed comfortably next to his family.

## 2020-09-22 ENCOUNTER — APPOINTMENT (OUTPATIENT)
Dept: CT IMAGING | Facility: HOSPITAL | Age: 85
End: 2020-09-22

## 2020-09-23 RX ORDER — POTASSIUM CHLORIDE 750 MG/1
TABLET, FILM COATED, EXTENDED RELEASE ORAL
Qty: 90 TABLET | Refills: 2 | OUTPATIENT
Start: 2020-09-23

## 2020-09-23 RX ORDER — ALLOPURINOL 100 MG/1
TABLET ORAL
Qty: 180 TABLET | Refills: 3 | OUTPATIENT
Start: 2020-09-23

## 2020-09-27 RX ORDER — CITALOPRAM 20 MG/1
TABLET ORAL
Qty: 90 TABLET | Refills: 1 | OUTPATIENT
Start: 2020-09-27

## 2020-10-19 RX ORDER — LEVOTHYROXINE SODIUM 125 MCG
TABLET ORAL
Qty: 90 TABLET | Refills: 1 | OUTPATIENT
Start: 2020-10-19

## 2024-01-21 NOTE — H&P
[place] [facility]  HISTORY AND PHYSICAL   [facility]        Patient Identification:  Name: Haris Sweeney  Age: 87 y.o.  Sex: male  :  1929  MRN: [id number]                     Primary Care Physician: Srikanth Madsen MD    Chief Complaint:  fall    History of Present Illness:   87-year-old white male history of atrial fibrillation and anticoagulation with Coumadin.  Presents after fall.  Recently had blood pressure medicines held for the past couple weeks secondary to low blood pressure.  Has had intermittent episodes of dizziness.  This is gotten worse the last 2 days.  Systolic blood pressure is been in the 80s at home.  Patient denies any dark tarry stools.  INR 1.6 here.  CT shows acute intracranial hemorrhage.  Patient fell upon standing earlier this evening.  Felt lightheadedness prior to fall.    Past Medical History:  Past Medical History:   Diagnosis Date   • Bladder cancer    • Kidney stones    • Thyroid goiter      Past Surgical History:  Past Surgical History:   Procedure Laterality Date   • CATARACT EXTRACTION     • TOTAL THYROIDECTOMY        Home Meds:    (Not in a hospital admission)    Allergies:  Allergies   Allergen Reactions   • Digoxin And Related    • Keflex [Cephalexin]      Immunizations:  Immunization History   Administered Date(s) Administered   • Influenza Split High Dose Preservative Free IM 10/05/2016     Social History:   Social History     Social History Narrative     Social History   Substance Use Topics   • Smoking status: Former Smoker     Types: Cigarettes   • Smokeless tobacco: Never Used   • Alcohol use No     Family History:  Family History   Problem Relation Age of Onset   • Heart disease Mother    • No Known Problems Father    • Prostate cancer Brother         Review of Systems  Denies fevers or chills  Denies nausea or vomiting  No new vision or hearing changes  No chest pain  No productive cough or shortness of breath  No diarrhea,  Patient reports that she has an abscess on left leg   "hematemesis or hematochezia, no dysuria or frequency  No musculoskeletal complaints  No heat or cold intolerance  No skin rashes  No dizziness or confusion.  No seizure activity  No new anxiety or depression  12 system review of systems performed and all else negative    Objective:  tMax 24 hrs: Temp (24hrs), Av.6 °F (36.4 °C), Min:97.6 °F (36.4 °C), Max:97.6 °F (36.4 °C)    Vitals Ranges:   Temp:  [97.6 °F (36.4 °C)] 97.6 °F (36.4 °C)  Heart Rate:  [94-99] 97  Resp:  [16-18] 16  BP: ()/(47-74) 101/74      Exam:  /74 (BP Location: Left arm, Patient Position: Sitting)  Pulse 97  Temp 97.6 °F (36.4 °C) (Tympanic)   Resp 16  Ht 64\" (162.6 cm)  Wt 153 lb (69.4 kg)  SpO2 98%  BMI 26.26 kg/m2    GENERAL APPEARANCE:   · Well developed  · Well nourished  · No acute distress   EYES:    · PERRL                                                                           · Conjunctiva normal  · Sclera non-icteric.  HENT:   · Atraumatic, normocephalic  · External ears and nose normal  · Moist mucus membranes and no ulcers  NECK:  · Thyroid not enlarged  · Trachea midline   RESPIRATORY:    · Nonlabored breathing   · Normal breath sounds  · No rales. No wheezing  · No dullness  CARDIOVASCULAR:    · RRR  · Normal S1, S2  · No murmur  · Lower extremity edema: none    GI:   · Bowel sounds normal  · Abdomen soft , non-distended, non-tender  · No abdominal masses.    MUSCULOSKELETAL:  · Normal movement of extremities  · No tenderness, no deformities  · No clubbing or cyanosis   Skin:    · No visible rashes  · No palpable nodules  PSYCHIATRIC:  · Speech and behavior appropriate  · Normal mood and affect  · Oriented to person, place and time  NEUROLOGIC:  .    Data Review:  Protime-INR [[id number]] (Abnormal) Collected: 17        Lab Status: Final result Specimen: Blood from Arm, Right Updated: 17        Protime 19.0 (H) Seconds         INR 1.65 (H)       [place] Draw [[id number]] Collected: " 04/17/17 2011       Lab Status: In process Specimen: Blood Updated: 04/17/17 2015       Narrative:         The following orders were created for panel order [place] Draw.  Procedure                               Abnormality         Status                     ---------                               -----------         ------                     Light Blue Top[[id number]]                                    In process                 Green Top (Gel)[[id number]]                                   In process                 Lavender Top[[id number]]                                      In process                 Gold Top - SST[[id number]]                                    In process                   Please view results for these tests on the individual orders.       Comprehensive Metabolic Panel [[id number]] (Abnormal) Collected: 04/17/17 2011       Lab Status: Final result Specimen: Blood from Arm, Right Updated: 04/17/17 2100        Glucose 117 (H) mg/dL         BUN 49 (H) mg/dL         Creatinine 1.51 (H) mg/dL         Sodium 133 (L) mmol/L         Potassium 5.4 (H) mmol/L         Chloride 94 (L) mmol/L         CO2 26.8 mmol/L         Calcium 8.9 mg/dL         Total Protein 6.2 g/dL         Albumin 3.00 (L) g/dL         ALT (SGPT) 21 U/L         AST (SGOT) 30 U/L         Alkaline Phosphatase 65 U/L         Total Bilirubin 0.4 mg/dL         eGFR Non African Amer 44 (L) mL/min/1.73         Globulin 3.2 gm/dL         A/G Ratio 0.9 g/dL         BUN/Creatinine Ratio 32.5 (H)        Anion Gap 12.2 mmol/L        Narrative:         The MDRD GFR formula is only valid for adults with stable renal function between ages 18 and 70.       Troponin [[id number]] (Abnormal) Collected: 04/17/17 2011       Lab Status: Final result Specimen: Blood from Arm, Right Updated: 04/17/17 2103        Troponin T 0.035 (H) ng/mL        Narrative:         Troponin T Reference Ranges:  Less than 0.03 ng/mL:    Negative for AMI  0.03 to 0.09 ng/mL:       Indeterminant for AMI  Greater than 0.09 ng/mL: Positive for AMI       CBC Auto Differential [[id number]] (Abnormal) Collected: 04/17/17 2011       Lab Status: Final result Specimen: Blood from Arm, Right Updated: 04/17/17 2038        WBC 8.97 10*3/mm3         RBC 4.42 (L) 10*6/mm3         Hemoglobin 14.7 g/dL         Hematocrit 44.1 %         MCV 99.8 (H) fL         MCH 33.3 (H) pg         MCHC 33.3 g/dL         RDW 14.8 (H) %         RDW-SD 54.4 (H) fl         MPV 10.7 fL         Platelets 174 10*3/mm3         Neutrophil % 73.9 %         Lymphocyte % 15.5 (L) %         Monocyte % 9.5 %         Eosinophil % 0.6 %         Basophil % 0.1 %         Immature Grans % 0.4 %         Neutrophils, Absolute 6.63 10*3/mm3         Lymphocytes, Absolute 1.39 10*3/mm3         Monocytes, Absolute 0.85 10*3/mm3         Eosinophils, Absolute 0.05 10*3/mm3         Basophils, Absolute 0.01 10*3/mm3         Immature Grans, Absolute 0.04 (H) 10*3/mm3        Hemoglobin A1c [[id number]] Collected: 04/05/17 1522       Lab Status: Final result Specimen: Blood Updated: 04/06/17 1149        Hemoglobin A1C 5.10 %        Narrative:         Performed at:  [address] [place] [address]  [id number]  : Parker Grewal MD, Phone:  [phone]       MicroAlbumin, Urine, Random [[id number]] Collected: 04/05/17 1522       Lab Status: Final result Specimen: Urine from Urine, Clean Catch Updated: 04/06/17 1149        Microalbumin, Urine 5.3 ug/mL        Narrative:         Performed at:  [address] [place] [address]  [id number]           chest X-ray  IMPRESSION:  No acute findings.     CT head reviewed:  IMPRESSION:  Minimal acute subarachnoid hemorrhage in the right temporal  lobe and both frontal lobes as described. There is no associated mass  effect. This is quite likely posttraumatic subarachnoid hemorrhage.    Assessment:  Subdural hemorrhage  Hypotension  Acute kidney injury  Chronic atrial  fibrillation  History of bladder cancer    Plan:  Admit to ICU  ALLEN consult  Reverse INR  Neuro checks per protocol  IV fluids for DORA with hypotension and volume depletion  DVT prophylaxis mechanical SCD/TEDs        Mandeep Asif MD  [facility], [facility]  Pulmonary and Critical Care Medicine    4/17/2017  9:31 PM

## (undated) DEVICE — PERFORATOR CDM WO/ DURAGUARD 14/11

## (undated) DEVICE — GLV SURG BIOGEL LTX PF 6 1/2

## (undated) DEVICE — SOL IRR PHYSIOSOL BTL 1000ML

## (undated) DEVICE — GLV SURG SENSICARE W/ALOE PF LF 7.5 STRL

## (undated) DEVICE — Device

## (undated) DEVICE — DRSNG SURESITE123 6X8IN

## (undated) DEVICE — MAYFIELD® DISPOSABLE ADULT SKULL PIN (PLASTIC BASE): Brand: MAYFIELD®

## (undated) DEVICE — INTENDED FOR TISSUE SEPARATION, AND OTHER PROCEDURES THAT REQUIRE A SHARP SURGICAL BLADE TO PUNCTURE OR CUT.: Brand: BARD-PARKER ® STAINLESS STEEL BLADES

## (undated) DEVICE — SMOKE EVACUATION TUBING WITH 7/8 IN TO 1/4 IN REDUCER: Brand: BUFFALO FILTER

## (undated) DEVICE — 2.3MM TAPERED ROUTER

## (undated) DEVICE — CONN TBG Y 5 IN 1 LF STRL

## (undated) DEVICE — SUT SILK 2/0 FS BLK 18IN 685G

## (undated) DEVICE — TRAP FLD MINIVAC MEGADYNE 100ML

## (undated) DEVICE — ANTIBACTERIAL UNDYED BRAIDED (POLYGLACTIN 910), SYNTHETIC ABSORBABLE SUTURE: Brand: COATED VICRYL

## (undated) DEVICE — TUBING, SUCTION, 1/4" X 20', STRAIGHT: Brand: MEDLINE INDUSTRIES, INC.

## (undated) DEVICE — SUT NUROLON 4/0 TF18 CR8 I8IN C584D

## (undated) DEVICE — DRP MICROSCOPE 4 BINOCULAR CV 54X150IN

## (undated) DEVICE — 1010 S-DRAPE TOWEL DRAPE 10/BX: Brand: STERI-DRAPE™

## (undated) DEVICE — DRSNG TELFA PAD NONADH STR 1S 3X8IN

## (undated) DEVICE — DURAHOOK 1/4HOOK PK/6

## (undated) DEVICE — GLV SURG BIOGEL LTX PF 7

## (undated) DEVICE — NDL HYPO PRECISIONGLIDE REG 20G 1 1/2

## (undated) DEVICE — PK CRANI 40

## (undated) DEVICE — PENCL E/S ULTRAVAC TELESCP NOSE HOLSTR 10FT

## (undated) DEVICE — DISPOSABLE IRRIGATION BIPOLAR CORD, M1000 TYPE: Brand: KIRWAN

## (undated) DEVICE — DRN WND JP RND W TROC SIL 10F 1/8IN

## (undated) DEVICE — ADHS SKIN DERMABOND TOP ADVANCED